# Patient Record
Sex: MALE | Race: WHITE | NOT HISPANIC OR LATINO | Employment: FULL TIME | URBAN - METROPOLITAN AREA
[De-identification: names, ages, dates, MRNs, and addresses within clinical notes are randomized per-mention and may not be internally consistent; named-entity substitution may affect disease eponyms.]

---

## 2017-05-22 ENCOUNTER — HOSPITAL ENCOUNTER (OUTPATIENT)
Dept: RADIOLOGY | Facility: HOSPITAL | Age: 57
Discharge: HOME | End: 2017-05-22
Attending: FAMILY MEDICINE
Payer: COMMERCIAL

## 2017-05-22 DIAGNOSIS — M86.171 OSTEOMYELITIS, ACUTE, ANKLE OR FOOT, RIGHT: ICD-10-CM

## 2017-05-22 DIAGNOSIS — S90.551A: ICD-10-CM

## 2017-05-22 DIAGNOSIS — S90.551A: Primary | ICD-10-CM

## 2017-05-22 PROCEDURE — 73610 X-RAY EXAM OF ANKLE: CPT | Mod: 26,RT,, | Performed by: RADIOLOGY

## 2017-05-22 PROCEDURE — 73610 X-RAY EXAM OF ANKLE: CPT | Mod: TC,RT

## 2017-05-26 ENCOUNTER — HOSPITAL ENCOUNTER (OUTPATIENT)
Dept: RADIOLOGY | Facility: HOSPITAL | Age: 57
Discharge: HOME | End: 2017-05-26
Attending: FAMILY MEDICINE
Payer: COMMERCIAL

## 2017-05-26 DIAGNOSIS — R19.7 DIARRHEA: ICD-10-CM

## 2017-05-26 DIAGNOSIS — R10.10 PAIN LOCALIZED TO UPPER ABDOMEN: ICD-10-CM

## 2017-05-26 DIAGNOSIS — R25.2 CRAMP OF LIMB: ICD-10-CM

## 2017-05-26 DIAGNOSIS — K21.00 GERD WITH ESOPHAGITIS: ICD-10-CM

## 2017-05-26 DIAGNOSIS — R19.7 DIARRHEA: Primary | ICD-10-CM

## 2017-05-26 PROCEDURE — 76700 US EXAM ABDOM COMPLETE: CPT | Mod: TC

## 2017-05-26 PROCEDURE — 76700 US EXAM ABDOM COMPLETE: CPT | Mod: 26,,, | Performed by: RADIOLOGY

## 2017-06-06 ENCOUNTER — HOSPITAL ENCOUNTER (OUTPATIENT)
Dept: PREADMISSION TESTING | Facility: HOSPITAL | Age: 57
Discharge: HOME | End: 2017-06-06
Attending: SURGERY
Payer: COMMERCIAL

## 2017-06-06 VITALS
WEIGHT: 266 LBS | TEMPERATURE: 98 F | HEIGHT: 70 IN | BODY MASS INDEX: 38.08 KG/M2 | SYSTOLIC BLOOD PRESSURE: 156 MMHG | HEART RATE: 91 BPM | DIASTOLIC BLOOD PRESSURE: 87 MMHG | RESPIRATION RATE: 18 BRPM | OXYGEN SATURATION: 96 %

## 2017-06-06 DIAGNOSIS — Z01.818 PRE-OP TESTING: Primary | ICD-10-CM

## 2017-06-06 LAB
ANION GAP SERPL CALC-SCNC: 10 MMOL/L
BASOPHILS # BLD AUTO: 0.05 K/UL
BASOPHILS NFR BLD: 0.7 %
BUN SERPL-MCNC: 19 MG/DL
CALCIUM SERPL-MCNC: 10.1 MG/DL
CHLORIDE SERPL-SCNC: 97 MMOL/L
CO2 SERPL-SCNC: 30 MMOL/L
CREAT SERPL-MCNC: 0.9 MG/DL
DIFFERENTIAL METHOD: ABNORMAL
EOSINOPHIL # BLD AUTO: 0.3 K/UL
EOSINOPHIL NFR BLD: 4 %
ERYTHROCYTE [DISTWIDTH] IN BLOOD BY AUTOMATED COUNT: 13.3 %
EST. GFR  (AFRICAN AMERICAN): >60 ML/MIN/1.73 M^2
EST. GFR  (NON AFRICAN AMERICAN): >60 ML/MIN/1.73 M^2
GLUCOSE SERPL-MCNC: 95 MG/DL
HCT VFR BLD AUTO: 45.9 %
HGB BLD-MCNC: 15.7 G/DL
LYMPHOCYTES # BLD AUTO: 1.8 K/UL
LYMPHOCYTES NFR BLD: 23.9 %
MCH RBC QN AUTO: 29.6 PG
MCHC RBC AUTO-ENTMCNC: 34.2 %
MCV RBC AUTO: 86 FL
MONOCYTES # BLD AUTO: 0.6 K/UL
MONOCYTES NFR BLD: 8 %
NEUTROPHILS # BLD AUTO: 4.8 K/UL
NEUTROPHILS NFR BLD: 63.1 %
PLATELET # BLD AUTO: 323 K/UL
PMV BLD AUTO: 8.6 FL
POTASSIUM SERPL-SCNC: 4.5 MMOL/L
RBC # BLD AUTO: 5.31 M/UL
SODIUM SERPL-SCNC: 137 MMOL/L
WBC # BLD AUTO: 7.53 K/UL

## 2017-06-06 PROCEDURE — 80048 BASIC METABOLIC PNL TOTAL CA: CPT

## 2017-06-06 PROCEDURE — 93005 ELECTROCARDIOGRAM TRACING: CPT

## 2017-06-06 PROCEDURE — 85025 COMPLETE CBC W/AUTO DIFF WBC: CPT

## 2017-06-06 PROCEDURE — 36415 COLL VENOUS BLD VENIPUNCTURE: CPT

## 2017-06-06 NOTE — DISCHARGE INSTRUCTIONS
Your surgery is scheduled for___6/7/2017______________.    Call [phone] between 2 pm and 5 pm _today___________ to find out your arrival time for the day of surgery.    Report to SAME DAY SURGERY UNIT at _______am on the 2nd floor of the hospital.  Use the front entrance of the hospital before 6 am.  If you need wheelchair assistance, call [phone] from your cell phone,  or call 0 from the courtesy phone in the hospital lobby.    Important instructions:   Do not eat or drink after 12 midnight, including water.  It is okay to brush your teeth.  Do not have gum, candy or mints.     Take only these medications with a small swallow of water on the morning of your surgery.____none _________             Please shower the night before and the morning of your surgery.       Use Hibiclens soap to your surgery site if instructed by your pre op nurse.   If your surgery is on your abdomen, be sure to wash your naval.  Be sure to rinse off Hibiclens after it is on your skin for several minutes.  Do not use Hibiclens on your face or genitals.              You may wear deodorant only.      Do not wear powder, body lotion or cologne.     Do not wear any jewelry or have any metal on your body.     Please bring any documents given to you by your doctor.     If you are going home on the same day of surgery, you must have arrangements for a ride home.  You will not be able to drive home if you were given anesthesia or sedation.     Stop taking Aspirin, Ibuprofen, Motrin and Aleve at least 3-5 days before your surgery. You may use Tylenol.     Stop taking fish oil and vitamin E for least 5 days before surgery.     Wear loose fitting clothes allowing for bandages.     Please leave money and valuables home.       You may bring your cell phone.     Call the doctor if fever or illness should occur before your surgery.    Call [phone] to contact us here at [facility] if needed.

## 2017-06-07 ENCOUNTER — ANESTHESIA (OUTPATIENT)
Dept: SURGERY | Facility: HOSPITAL | Age: 57
End: 2017-06-07
Payer: COMMERCIAL

## 2017-06-07 ENCOUNTER — HOSPITAL ENCOUNTER (OUTPATIENT)
Facility: HOSPITAL | Age: 57
Discharge: HOME | End: 2017-06-07
Attending: SURGERY | Admitting: SURGERY
Payer: COMMERCIAL

## 2017-06-07 ENCOUNTER — ANESTHESIA EVENT (OUTPATIENT)
Dept: SURGERY | Facility: HOSPITAL | Age: 57
End: 2017-06-07
Payer: COMMERCIAL

## 2017-06-07 VITALS
DIASTOLIC BLOOD PRESSURE: 81 MMHG | SYSTOLIC BLOOD PRESSURE: 137 MMHG | HEART RATE: 91 BPM | RESPIRATION RATE: 18 BRPM | WEIGHT: 266 LBS | TEMPERATURE: 98 F | HEIGHT: 70 IN | BODY MASS INDEX: 38.08 KG/M2 | OXYGEN SATURATION: 96 %

## 2017-06-07 DIAGNOSIS — K80.20 CALCULUS OF GALLBLADDER WITHOUT CHOLECYSTITIS WITHOUT OBSTRUCTION: Primary | ICD-10-CM

## 2017-06-07 PROCEDURE — 71000016 HC POSTOP RECOV ADDL HR: Performed by: SURGERY

## 2017-06-07 PROCEDURE — 88304 TISSUE EXAM BY PATHOLOGIST: CPT | Mod: 26,,, | Performed by: PATHOLOGY

## 2017-06-07 PROCEDURE — 25000003 PHARM REV CODE 250: Performed by: NURSE ANESTHETIST, CERTIFIED REGISTERED

## 2017-06-07 PROCEDURE — 25000242 PHARM REV CODE 250 ALT 637 W/ HCPCS

## 2017-06-07 PROCEDURE — D9220A PRA ANESTHESIA: Mod: CRNA,,, | Performed by: NURSE ANESTHETIST, CERTIFIED REGISTERED

## 2017-06-07 PROCEDURE — 71000033 HC RECOVERY, INTIAL HOUR: Performed by: SURGERY

## 2017-06-07 PROCEDURE — 36000708 HC OR TIME LEV III 1ST 15 MIN: Performed by: SURGERY

## 2017-06-07 PROCEDURE — 25000003 PHARM REV CODE 250: Performed by: SURGERY

## 2017-06-07 PROCEDURE — 27201423 OPTIME MED/SURG SUP & DEVICES STERILE SUPPLY: Performed by: SURGERY

## 2017-06-07 PROCEDURE — 63600175 PHARM REV CODE 636 W HCPCS: Performed by: NURSE ANESTHETIST, CERTIFIED REGISTERED

## 2017-06-07 PROCEDURE — D9220A PRA ANESTHESIA: Mod: ANES,,, | Performed by: ANESTHESIOLOGY

## 2017-06-07 PROCEDURE — 71000015 HC POSTOP RECOV 1ST HR: Performed by: SURGERY

## 2017-06-07 PROCEDURE — 37000009 HC ANESTHESIA EA ADD 15 MINS: Performed by: SURGERY

## 2017-06-07 PROCEDURE — 37000008 HC ANESTHESIA 1ST 15 MINUTES: Performed by: SURGERY

## 2017-06-07 PROCEDURE — 36000709 HC OR TIME LEV III EA ADD 15 MIN: Performed by: SURGERY

## 2017-06-07 PROCEDURE — 63600175 PHARM REV CODE 636 W HCPCS: Performed by: ANESTHESIOLOGY

## 2017-06-07 PROCEDURE — 88304 TISSUE EXAM BY PATHOLOGIST: CPT | Performed by: PATHOLOGY

## 2017-06-07 PROCEDURE — 71000039 HC RECOVERY, EACH ADD'L HOUR: Performed by: SURGERY

## 2017-06-07 RX ORDER — CEFAZOLIN SODIUM 1 G/50ML
SOLUTION INTRAVENOUS
Status: DISCONTINUED
Start: 2017-06-07 | End: 2017-06-08 | Stop reason: HOSPADM

## 2017-06-07 RX ORDER — IPRATROPIUM BROMIDE AND ALBUTEROL SULFATE 2.5; .5 MG/3ML; MG/3ML
3 SOLUTION RESPIRATORY (INHALATION) ONCE
Status: COMPLETED | OUTPATIENT
Start: 2017-06-07 | End: 2017-06-07

## 2017-06-07 RX ORDER — FENTANYL CITRATE 50 UG/ML
INJECTION, SOLUTION INTRAMUSCULAR; INTRAVENOUS
Status: DISCONTINUED | OUTPATIENT
Start: 2017-06-07 | End: 2017-06-07

## 2017-06-07 RX ORDER — PROPOFOL 10 MG/ML
VIAL (ML) INTRAVENOUS
Status: DISCONTINUED | OUTPATIENT
Start: 2017-06-07 | End: 2017-06-07

## 2017-06-07 RX ORDER — FENTANYL CITRATE 50 UG/ML
25 INJECTION, SOLUTION INTRAMUSCULAR; INTRAVENOUS EVERY 5 MIN PRN
Status: DISCONTINUED | OUTPATIENT
Start: 2017-06-07 | End: 2017-06-08 | Stop reason: HOSPADM

## 2017-06-07 RX ORDER — SODIUM CHLORIDE, SODIUM LACTATE, POTASSIUM CHLORIDE, CALCIUM CHLORIDE 600; 310; 30; 20 MG/100ML; MG/100ML; MG/100ML; MG/100ML
INJECTION, SOLUTION INTRAVENOUS CONTINUOUS
Status: DISCONTINUED | OUTPATIENT
Start: 2017-06-07 | End: 2017-06-08 | Stop reason: HOSPADM

## 2017-06-07 RX ORDER — NEOSTIGMINE METHYLSULFATE 1 MG/ML
INJECTION, SOLUTION INTRAVENOUS
Status: DISCONTINUED | OUTPATIENT
Start: 2017-06-07 | End: 2017-06-07

## 2017-06-07 RX ORDER — ACETAMINOPHEN 325 MG/1
650 TABLET ORAL EVERY 4 HOURS PRN
Status: DISCONTINUED | OUTPATIENT
Start: 2017-06-07 | End: 2017-06-08 | Stop reason: HOSPADM

## 2017-06-07 RX ORDER — ONDANSETRON 2 MG/ML
INJECTION INTRAMUSCULAR; INTRAVENOUS
Status: DISCONTINUED | OUTPATIENT
Start: 2017-06-07 | End: 2017-06-07

## 2017-06-07 RX ORDER — HYDROMORPHONE HYDROCHLORIDE 2 MG/ML
0.2 INJECTION, SOLUTION INTRAMUSCULAR; INTRAVENOUS; SUBCUTANEOUS EVERY 5 MIN PRN
Status: DISCONTINUED | OUTPATIENT
Start: 2017-06-07 | End: 2017-06-08 | Stop reason: HOSPADM

## 2017-06-07 RX ORDER — HYDROCODONE BITARTRATE AND ACETAMINOPHEN 10; 325 MG/1; MG/1
1 TABLET ORAL EVERY 4 HOURS PRN
Status: DISCONTINUED | OUTPATIENT
Start: 2017-06-07 | End: 2017-06-08 | Stop reason: HOSPADM

## 2017-06-07 RX ORDER — IPRATROPIUM BROMIDE AND ALBUTEROL SULFATE 2.5; .5 MG/3ML; MG/3ML
SOLUTION RESPIRATORY (INHALATION)
Status: COMPLETED
Start: 2017-06-07 | End: 2017-06-07

## 2017-06-07 RX ORDER — HYDROCODONE BITARTRATE AND ACETAMINOPHEN 5; 325 MG/1; MG/1
1 TABLET ORAL EVERY 4 HOURS PRN
Status: DISCONTINUED | OUTPATIENT
Start: 2017-06-07 | End: 2017-06-08 | Stop reason: HOSPADM

## 2017-06-07 RX ORDER — SODIUM CHLORIDE 9 MG/ML
INJECTION, SOLUTION INTRAVENOUS CONTINUOUS
Status: DISCONTINUED | OUTPATIENT
Start: 2017-06-07 | End: 2017-06-08 | Stop reason: HOSPADM

## 2017-06-07 RX ORDER — METOCLOPRAMIDE HYDROCHLORIDE 5 MG/ML
INJECTION INTRAMUSCULAR; INTRAVENOUS
Status: DISCONTINUED | OUTPATIENT
Start: 2017-06-07 | End: 2017-06-07

## 2017-06-07 RX ORDER — SODIUM CHLORIDE 0.9 % (FLUSH) 0.9 %
3 SYRINGE (ML) INJECTION EVERY 8 HOURS
Status: DISCONTINUED | OUTPATIENT
Start: 2017-06-07 | End: 2017-06-08 | Stop reason: HOSPADM

## 2017-06-07 RX ORDER — GLYCOPYRROLATE 0.2 MG/ML
INJECTION INTRAMUSCULAR; INTRAVENOUS
Status: DISCONTINUED | OUTPATIENT
Start: 2017-06-07 | End: 2017-06-07

## 2017-06-07 RX ORDER — HYDROCODONE BITARTRATE AND ACETAMINOPHEN 5; 325 MG/1; MG/1
1-2 TABLET ORAL EVERY 4 HOURS PRN
Qty: 50 TABLET | Refills: 0 | Status: SHIPPED | OUTPATIENT
Start: 2017-06-07 | End: 2022-01-12

## 2017-06-07 RX ORDER — ROCURONIUM BROMIDE 10 MG/ML
INJECTION, SOLUTION INTRAVENOUS
Status: DISCONTINUED | OUTPATIENT
Start: 2017-06-07 | End: 2017-06-07

## 2017-06-07 RX ORDER — LIDOCAINE HCL/PF 100 MG/5ML
SYRINGE (ML) INTRAVENOUS
Status: DISCONTINUED | OUTPATIENT
Start: 2017-06-07 | End: 2017-06-07

## 2017-06-07 RX ORDER — PHENYLEPHRINE HYDROCHLORIDE 10 MG/ML
INJECTION INTRAVENOUS
Status: DISCONTINUED | OUTPATIENT
Start: 2017-06-07 | End: 2017-06-07

## 2017-06-07 RX ORDER — ONDANSETRON 2 MG/ML
4 INJECTION INTRAMUSCULAR; INTRAVENOUS EVERY 8 HOURS PRN
Status: DISCONTINUED | OUTPATIENT
Start: 2017-06-07 | End: 2017-06-08 | Stop reason: HOSPADM

## 2017-06-07 RX ORDER — CEFAZOLIN SODIUM 2 G/50ML
SOLUTION INTRAVENOUS
Status: DISCONTINUED
Start: 2017-06-07 | End: 2017-06-08 | Stop reason: HOSPADM

## 2017-06-07 RX ORDER — MIDAZOLAM HYDROCHLORIDE 1 MG/ML
INJECTION, SOLUTION INTRAMUSCULAR; INTRAVENOUS
Status: DISCONTINUED | OUTPATIENT
Start: 2017-06-07 | End: 2017-06-07

## 2017-06-07 RX ORDER — SODIUM CHLORIDE 0.9 % (FLUSH) 0.9 %
3 SYRINGE (ML) INJECTION
Status: DISCONTINUED | OUTPATIENT
Start: 2017-06-07 | End: 2017-06-08 | Stop reason: HOSPADM

## 2017-06-07 RX ORDER — SUCCINYLCHOLINE CHLORIDE 20 MG/ML
INJECTION INTRAMUSCULAR; INTRAVENOUS
Status: DISCONTINUED | OUTPATIENT
Start: 2017-06-07 | End: 2017-06-07

## 2017-06-07 RX ADMIN — PHENYLEPHRINE HYDROCHLORIDE 300 MCG: 10 INJECTION INTRAVENOUS at 01:06

## 2017-06-07 RX ADMIN — NEOSTIGMINE METHYLSULFATE 3.5 MG: 1 INJECTION INTRAVENOUS at 01:06

## 2017-06-07 RX ADMIN — FENTANYL CITRATE 100 MCG: 50 INJECTION INTRAMUSCULAR; INTRAVENOUS at 12:06

## 2017-06-07 RX ADMIN — HYDROMORPHONE HYDROCHLORIDE 0.2 MG: 2 INJECTION INTRAMUSCULAR; INTRAVENOUS; SUBCUTANEOUS at 02:06

## 2017-06-07 RX ADMIN — IPRATROPIUM BROMIDE AND ALBUTEROL SULFATE 3 ML: 2.5; .5 SOLUTION RESPIRATORY (INHALATION) at 03:06

## 2017-06-07 RX ADMIN — SODIUM CHLORIDE, SODIUM LACTATE, POTASSIUM CHLORIDE, AND CALCIUM CHLORIDE: .6; .31; .03; .02 INJECTION, SOLUTION INTRAVENOUS at 07:06

## 2017-06-07 RX ADMIN — LIDOCAINE HYDROCHLORIDE 100 MG: 20 INJECTION, SOLUTION INTRAVENOUS at 12:06

## 2017-06-07 RX ADMIN — ONDANSETRON 4 MG: 2 INJECTION, SOLUTION INTRAMUSCULAR; INTRAVENOUS at 01:06

## 2017-06-07 RX ADMIN — PHENYLEPHRINE HYDROCHLORIDE 200 MCG: 10 INJECTION INTRAVENOUS at 01:06

## 2017-06-07 RX ADMIN — GLYCOPYRROLATE 0.2 MG: 0.2 INJECTION, SOLUTION INTRAMUSCULAR; INTRAVENOUS at 12:06

## 2017-06-07 RX ADMIN — SODIUM CHLORIDE: 0.9 INJECTION, SOLUTION INTRAVENOUS at 02:06

## 2017-06-07 RX ADMIN — METOCLOPRAMIDE 10 MG: 5 INJECTION, SOLUTION INTRAMUSCULAR; INTRAVENOUS at 12:06

## 2017-06-07 RX ADMIN — ROCURONIUM BROMIDE 20 MG: 10 INJECTION, SOLUTION INTRAVENOUS at 12:06

## 2017-06-07 RX ADMIN — CEFAZOLIN SODIUM 1 G: 1 POWDER, FOR SOLUTION INTRAMUSCULAR; INTRAVENOUS at 12:06

## 2017-06-07 RX ADMIN — PROPOFOL 200 MG: 10 INJECTION, EMULSION INTRAVENOUS at 12:06

## 2017-06-07 RX ADMIN — MIDAZOLAM HYDROCHLORIDE 2 MG: 1 INJECTION, SOLUTION INTRAMUSCULAR; INTRAVENOUS at 12:06

## 2017-06-07 RX ADMIN — GLYCOPYRROLATE 0.4 MG: 0.2 INJECTION, SOLUTION INTRAMUSCULAR; INTRAVENOUS at 01:06

## 2017-06-07 RX ADMIN — CEFAZOLIN SODIUM 2 G: 1 POWDER, FOR SOLUTION INTRAMUSCULAR; INTRAVENOUS at 12:06

## 2017-06-07 RX ADMIN — SUCCINYLCHOLINE CHLORIDE 200 MG: 20 INJECTION, SOLUTION INTRAMUSCULAR; INTRAVENOUS at 12:06

## 2017-06-07 RX ADMIN — IPRATROPIUM BROMIDE AND ALBUTEROL SULFATE 3 ML: .5; 3 SOLUTION RESPIRATORY (INHALATION) at 03:06

## 2017-06-07 RX ADMIN — ROCURONIUM BROMIDE 10 MG: 10 INJECTION, SOLUTION INTRAVENOUS at 12:06

## 2017-06-07 NOTE — INTERVAL H&P NOTE
The patient has been examined and the H&P has been reviewed:    I concur with the findings and no changes have occurred since H&P was written.    Anesthesia/Surgery risks, benefits and alternative options discussed and understood by patient/family.          Active Hospital Problems    Diagnosis  POA    Calculus of gallbladder without cholecystitis without obstruction [K80.20]  Yes      Resolved Hospital Problems    Diagnosis Date Resolved POA   No resolved problems to display.

## 2017-06-07 NOTE — OP NOTE
Laparoscopic Cholecystectomy Procedure Note    Pre-operative Diagnosis: Calculus of gallbladder with other cholecystitis, without mention of obstruction    Post-operative Diagnosis: Same    Surgery Date: 6/7/2017     Surgeon: Juanito Snell     Assistants: none    Anesthesia: General endotracheal anesthesia    ASA Class: 2      Indications: This patient presents with symptomatic gallbladder disease and will undergo laparoscopic cholecystectomy.    The patient was seen again in the Holding Room. The risks and benefits, including but not limited to common bile duct injury were explained to the patient, who acknowledges these risks and wishes to proceed.    Procedure Details   The patient was taken to Operating Room, identified as Ned Brock and the procedure verified as Laparoscopic Cholecystectomy. A Time Out was held and the above information confirmed.    Prior to the induction of general anesthesia, antibiotic prophylaxis was administered. General endotracheal anesthesia was then administered and tolerated well. After the induction, the abdomen was prepped in the usual sterile fashion. The patient was positioned in the supine position with the arms extended.    All ports were injected with a combination of short and long acting local anesthetic prior to incision.  An incision was made in the umbilicus.  The skin edges were grasped with penetrating towel clips and used to retract the abdominal wall upward.  A Veress needle was inserted into the peritoneal cavity.  Initial pressures were less than 5 mm Hg.  A pneumoperitoneum to 15 mm Hg was obtained.  The abdomen was then entered under direct visualization using an optical viewing 11 mm trochar and a 0 degree laparoscope.  The underlying bowel was inspected and found to be free of injury.  The remaining ports were placed under direct visualization.  All were 5 mm ports: one in the subxiphoid area, one in the right midclavicular line, and one in the right  anterior axillary line.    A locking grasper was introduced through the most lateral port and used to retract the dome of the gallbladder up and over the edge of the liver.  A Derek grasper was used to retract the infundibulum of the gallbladder laterally.  The triangle of Calot was opened using electrocautery and dissected using Maryland forceps.  Once a single duct and single artery were seen entering the gallbladder, the cystic duct was clipped once proximally, twice distally and divided.  The cystic artery was clipped once proximally, once distally and divided.  The gallbladder was then removed from the gallbladder fossa using electrocautery and removed through the umbilicus.    The right upper quadrant was irrigated until clear.  Hemostasis on the gallbladder fossa was achieved with electrocautery.  The pneumoperitoneum was then expelled from the abdomen, and all ports were removed.  The umbilical fascia was reapproximated with a single figure-of-eight 0 vicryl suture.  The skin over all incisions were closed using 4-0 monocryl deep dermal sutures.  Dermabond was applied and the patient was aroused and transferred to the recovery room in stable condition.    Instrument, sponge, and needle counts were correct at closure and at the conclusion of the case.  There were no immediate complications.    Findings:  Cholecystitis with Cholelithiasis    Estimated Blood Loss: Minimal           Drains: none           Total IV Fluids: see anesthesia records           Specimens: Gallbladder             Complications: None; patient tolerated the procedure well.           Disposition: PACU - hemodynamically stable.           Condition: stable

## 2017-06-07 NOTE — DISCHARGE SUMMARY
[facility]  Discharge Note  Short Stay    Admit Date: 6/7/2017    Discharge Date and Time: 6/7/17    Attending Physician: Juanito Snell MD     Discharge Provider: Juanito Snell    Diagnoses:  Active Hospital Problems    Diagnosis  POA    *Calculus of gallbladder without cholecystitis without obstruction [K80.20]  Yes      Resolved Hospital Problems    Diagnosis Date Resolved POA   No resolved problems to display.       Discharged Condition: good    Hospital Course: Patient was admitted for an outpatient procedure and tolerated the procedure well with no complications.    Final Diagnoses: Same as principal problem.    Disposition: Home or Self Care    Follow up/Patient Instructions:    Medications:  Reconciled Home Medications:   Current Discharge Medication List      START taking these medications    Details   hydrocodone-acetaminophen 5-325mg (NORCO) 5-325 mg per tablet Take 1-2 tablets by mouth every 4 (four) hours as needed for Pain.  Qty: 50 tablet, Refills: 0         CONTINUE these medications which have NOT CHANGED    Details   amoxicillin-clavulanate 875-125mg (AUGMENTIN) 875-125 mg per tablet Take 1 tablet by mouth every 12 (twelve) hours.      doxycycline (VIBRAMYCIN) 100 MG Cap Take 100 mg by mouth 2 (two) times daily.      losartan-hydrochlorothiazide 100-25 mg (HYZAAR) 100-25 mg per tablet Take 1 tablet by mouth once daily.      omeprazole (PRILOSEC) 20 MG capsule Take 20 mg by mouth once daily.      dicyclomine (BENTYL) 20 mg tablet Take 20 mg by mouth every 6 (six) hours.             Discharge Procedure Orders  Diet general     Lifting restrictions   Order Comments: No lifting greater than 10 pounds for 6 weeks     Call MD for:  temperature >100.4     Call MD for:  persistent nausea and vomiting     Call MD for:  severe uncontrolled pain     Call MD for:  difficulty breathing, headache or visual disturbances     Call MD for:  redness, tenderness, or signs of infection (pain, swelling,  redness, odor or green/yellow discharge around incision site)     Call MD for:  hives     Call MD for:  persistent dizziness or light-headedness     Call MD for:  extreme fatigue     No dressing needed       Follow-up Information     Follow up In 2 weeks.                 Discharge Procedure Orders (must include Diet, Follow-up, Activity):    Discharge Procedure Orders (must include Diet, Follow-up, Activity)  Diet general     Lifting restrictions   Order Comments: No lifting greater than 10 pounds for 6 weeks     Call MD for:  temperature >100.4     Call MD for:  persistent nausea and vomiting     Call MD for:  severe uncontrolled pain     Call MD for:  difficulty breathing, headache or visual disturbances     Call MD for:  redness, tenderness, or signs of infection (pain, swelling, redness, odor or green/yellow discharge around incision site)     Call MD for:  hives     Call MD for:  persistent dizziness or light-headedness     Call MD for:  extreme fatigue     No dressing needed

## 2017-06-07 NOTE — ANESTHESIA PREPROCEDURE EVALUATION
06/07/2017  Ned Brock is a 56 y.o., male.    Anesthesia Evaluation    I have reviewed the Patient Summary Reports.    I have reviewed the Nursing Notes.      Review of Systems  Anesthesia Hx:  No problems with previous Anesthesia    Social:  Non-Smoker    Cardiovascular:   Exercise tolerance: good Denies Pacemaker. Hypertension  Denies Valvular problems/Murmurs.  Denies MI.  Denies CAD.    Denies CABG/stent.  Denies Dysrhythmias.   Denies Angina.             denies PVD    Pulmonary:  Pulmonary Normal    Renal/:  Renal/ Normal     Hepatic/GI:   No Bowel Prep. Denies PUD. GERD, well controlled Liver Disease, (pt has liver dz (?fatty liver) secondary to dietary indiscretion; possible ascites)    Neurological:  Neurology Normal    Endocrine:  Endocrine Normal        Physical Exam  General:  Morbid Obesity    Airway/Jaw/Neck:   M4  Adequate mouth opening  Denies loose dentition  Short thick neck limits ROM  Poor prognatism          Mental Status:  Mental Status Findings: Normal        Anesthesia Plan  Type of Anesthesia, risks & benefits discussed:  Anesthesia Type:  general  Patient's Preference:   Intra-op Monitoring Plan:   Intra-op Monitoring Plan Comments:   Post Op Pain Control Plan:   Post Op Pain Control Plan Comments:   Induction:   IV  Beta Blocker:  Patient is not currently on a Beta-Blocker (No further documentation required).       Informed Consent: Patient understands risks and agrees with Anesthesia plan.  Questions answered. Anesthesia consent signed with patient.  ASA Score: 3     Day of Surgery Review of History & Physical:    H&P update referred to the surgeon.     Anesthesia Plan Notes: Npo  Will have advanced airway equipment set up and in room         Ready For Surgery From Anesthesia Perspective.

## 2017-06-07 NOTE — OR NURSING
Pt alert and awake.  Dermaflex applied to umbilical incision.  No bloody oozing noted at present.  Abdomen large and firm.  Pt reports abdomen to be that size before surgery.  Will continue to monitor patient.

## 2017-06-07 NOTE — H&P (VIEW-ONLY)
History & Physical    SUBJECTIVE:     History of Present Illness:  Patient is a 56 y.o. male with long history of bloating and epigastric pain.  Was initially felt to have ulcers but no relief with PPI.  Pain seems to be worse with fatty foods.  Resolves spontaneously.    Chief Complaint   Patient presents with    Cholelithiasis       Review of patient's allergies indicates:  No Known Allergies    Current Outpatient Prescriptions   Medication Sig Dispense Refill    amoxicillin-clavulanate 875-125mg (AUGMENTIN) 875-125 mg per tablet Take 1 tablet by mouth every 12 (twelve) hours.      dicyclomine (BENTYL) 20 mg tablet Take 20 mg by mouth every 6 (six) hours.      doxycycline (VIBRAMYCIN) 100 MG Cap Take 100 mg by mouth 2 (two) times daily.      losartan-hydrochlorothiazide 100-25 mg (HYZAAR) 100-25 mg per tablet Take 1 tablet by mouth once daily.      omeprazole (PRILOSEC) 20 MG capsule Take 20 mg by mouth once daily.       No current facility-administered medications for this visit.        Past Medical History:   Diagnosis Date    BPH without obstruction/lower urinary tract symptoms     Elevated PSA     Hypertension      Past Surgical History:   Procedure Laterality Date    CYST REMOVAL      EYE SURGERY       Family History   Problem Relation Age of Onset    Breast cancer Mother      Social History   Substance Use Topics    Smoking status: Never Smoker    Smokeless tobacco: Not on file    Alcohol use No        Review of Systems:    Review of Systems   Constitutional: Negative for chills and fever.   HENT: Negative.    Eyes: Negative.    Respiratory: Negative for cough and shortness of breath.    Cardiovascular: Negative for chest pain and palpitations.   Gastrointestinal: Positive for abdominal pain. Negative for nausea and vomiting.        Bloating   Musculoskeletal: Negative.    Skin: Negative.    Hematological: Negative.    Psychiatric/Behavioral: Negative.        OBJECTIVE:     Vital Signs (Most  "Recent)     5' 10" (1.778 m)  120.2 kg (265 lb)     Physical Exam:    Physical Exam   Constitutional: He is oriented to person, place, and time. He appears well-developed and well-nourished.   HENT:   Head: Normocephalic and atraumatic.   Eyes: Pupils are equal, round, and reactive to light. No scleral icterus.   Neck: Normal range of motion. No thyromegaly present.   Cardiovascular: Normal rate and regular rhythm.    Pulmonary/Chest: Effort normal and breath sounds normal.   Abdominal: Soft. He exhibits no distension. There is no tenderness.   Neurological: He is alert and oriented to person, place, and time.   Skin: Skin is warm and dry.   Psychiatric: He has a normal mood and affect. Thought content normal.       Laboratory      Diagnostic Results:  US shows cholelithiasis    ASSESSMENT/PLAN:     Cholelithiasis with biliary colic--lap cholecystectomy  "

## 2017-06-07 NOTE — TRANSFER OF CARE
"Anesthesia Transfer of Care Note    Patient: Ned Brock    Procedure(s) Performed: Procedure(s) (LRB):  CHOLECYSTECTOMY-LAPAROSCOPIC (N/A)    Patient location: PACU    Anesthesia Type: general    Transport from OR: Transported from OR on room air with adequate spontaneous ventilation    Post pain: adequate analgesia    Post assessment: no apparent anesthetic complications    Post vital signs: stable    Level of consciousness: awake    Nausea/Vomiting: no nausea/vomiting    Complications: none    Transfer of care protocol was followed      Last vitals:   Visit Vitals  /74 (BP Location: Right arm, Patient Position: Lying, BP Method: Automatic)   Pulse 87   Temp 37.3 °C (99.1 °F) (Oral)   Resp 18   Ht 5' 10" (1.778 m)   Wt 120.7 kg (266 lb)   SpO2 (!) 94%   BMI 38.17 kg/m²     "

## 2017-06-07 NOTE — PLAN OF CARE
Problem: Patient Care Overview  Goal: Plan of Care Review  Outcome: Outcome(s) achieved Date Met: 06/07/17 06/07/17 1600   Coping/Psychosocial   Plan Of Care Reviewed With patient;spouse     Released from PACU per Dr Alvarado. LIVIA4. VSS per flow sheet. Pain is tolerable per patient. Lap sites x 3 with dermabond to abdomen cdi. No n/v noted; tolerating ice chips. Spouse at bedside. Hand off report to CINTIA Villarreal. Same Day Surgery

## 2017-06-07 NOTE — PROGRESS NOTES
Incentive spirometry taught and performed well, but Unable to retain O2 sat >92% on room air.  Dr Oh notified; duoneb tx ordered and administered. Will continue to monitor. Family updated.

## 2017-06-07 NOTE — OR NURSING
Notified surgery ( CINTIA Quiroz) that pt abdominal incision is oozing; also, gauze dressing placed at abdominal site.   Instructed that he would deliver message to Dr. Snell.

## 2017-06-07 NOTE — DISCHARGE INSTRUCTIONS
No lifting greater than 10 pounds for 6 weeks  DRESSING:  You have Dermaflex covering your incision.  It is a clear liquid that dries like super glue.  Do not try to remove it.  It will flake off over time.  You may wash it with soap and water in the shower.  Do not apply any creams or lotion on it.    BATHING:    You may shower, but no tub baths until you see the doctor.    ACTIVITY LEVEL: If you have received sedation or an anesthetic, you may feel sleepy for several hours. Rest until you are more awake. Gradually resume your normal activities. No driving or alcoholic beverages for 24 hours.     No driving, alcoholic beverages or signing legal documents for next 24 hours or while taking pain medication    DIET: You may resume your home diet. If nausea is present, increase your diet gradually with fluids and bland foods.    Medications: Pain medication should be taken only if needed and as directed. If antibiotics are prescribed, the medication should be taken until completed. You will be given an updated list of you medications.    CALL THE DOCTOR:    For any obvious bleeding (some dried blood over the incision is normal).                          Redness, swelling, foul smell around incision or fever over            101.                    Shortness of breath.   Persistent pain or nausea not relieved by medication.      Fall Prevention  Millions of people fall every year and injure themselves. You may have had anesthesia or sedation which may increase your risk of falling. You may have health issues that put you at an increased risk of falling.     Here are ways to reduce your risk of falling.  ·   · Make your home safe by keeping walkways clear of objects you may trip over.  · Use non-slip pads under rugs. Do not use area rugs or small throw rugs.  · Use non-slip mats in bathtubs and showers.  · Install handrails and lights on staircases.  · Do not walk in poorly lit areas.  · Do not stand on chairs or wobbly  ladders.  · Use caution when reaching overhead or looking upward. This position can cause a loss of balance.  · Be sure your shoes fit properly, have non-slip bottoms and are in good condition.   · Wear shoes both inside and out. Avoid going barefoot or wearing slippers.  · Be cautious when going up and down stairs, curbs, and when walking on uneven sidewalks.  · If your balance is poor, consider using a cane or walker.  · If your fall was related to alcohol use, stop or limit alcohol intake.   · If your fall was related to use of sleeping medicines, talk to your doctor about this. You may need to reduce your dosage at bedtime if you awaken during the night to go to the bathroom.    · To reduce the need for nighttime bathroom trips:  ¨ Avoid drinking fluids for several hours before going to bed  ¨ Empty your bladder before going to bed  ¨ Men can keep a urinal at the bedside  · Stay as active as you can. Balance, flexibility, strength, and endurance all come from exercise. They all play a role in preventing falls. Ask your healthcare provider which types of activity are right for you.  · Get your vision checked on a regular basis.  · If you have pets, know where they are before you stand up or walk so you don't trip over them.  · Use night lights.

## 2017-06-08 NOTE — OR NURSING
Notified Dr. Snell that patient 's umbilical incision is dry and intact.  No bleeding noted. dermaflex in place at present.  Orders received.

## 2017-06-09 NOTE — ANESTHESIA POSTPROCEDURE EVALUATION
"Anesthesia Post Evaluation    Patient: Ned Brock    Procedure(s) Performed: Procedure(s) (LRB):  CHOLECYSTECTOMY-LAPAROSCOPIC (N/A)    Final Anesthesia Type: general  Patient location during evaluation: PACU  Patient participation: Yes- Able to Participate  Level of consciousness: awake and alert and oriented  Post-procedure vital signs: reviewed and stable  Pain management: adequate  Airway patency: patent  PONV status at discharge: No PONV  Anesthetic complications: no      Cardiovascular status: blood pressure returned to baseline and hemodynamically stable  Respiratory status: unassisted  Hydration status: euvolemic  Follow-up not needed.        Visit Vitals  /81   Pulse 91   Temp 36.8 °C (98.2 °F) (Oral)   Resp 18   Ht 5' 10" (1.778 m)   Wt 120.7 kg (266 lb)   SpO2 96%   BMI 38.17 kg/m²       Pain/William Score: No Data Recorded      "

## 2017-12-03 PROCEDURE — 99284 EMERGENCY DEPT VISIT MOD MDM: CPT

## 2017-12-04 ENCOUNTER — HOSPITAL ENCOUNTER (EMERGENCY)
Facility: HOSPITAL | Age: 57
Discharge: HOME | End: 2017-12-04
Attending: EMERGENCY MEDICINE
Payer: COMMERCIAL

## 2017-12-04 VITALS
WEIGHT: 260 LBS | HEIGHT: 71 IN | HEART RATE: 75 BPM | SYSTOLIC BLOOD PRESSURE: 171 MMHG | TEMPERATURE: 99 F | DIASTOLIC BLOOD PRESSURE: 92 MMHG | BODY MASS INDEX: 36.4 KG/M2 | RESPIRATION RATE: 16 BRPM | OXYGEN SATURATION: 95 %

## 2017-12-04 DIAGNOSIS — R60.0 LEG EDEMA: ICD-10-CM

## 2017-12-04 DIAGNOSIS — M25.569 KNEE PAIN: ICD-10-CM

## 2017-12-04 LAB
ALBUMIN SERPL BCP-MCNC: 3.7 G/DL
ALP SERPL-CCNC: 72 U/L
ALT SERPL W/O P-5'-P-CCNC: 47 U/L
ANION GAP SERPL CALC-SCNC: 11 MMOL/L
AST SERPL-CCNC: 32 U/L
BASOPHILS # BLD AUTO: 0.03 K/UL
BASOPHILS NFR BLD: 0.4 %
BILIRUB SERPL-MCNC: 0.3 MG/DL
BUN SERPL-MCNC: 17 MG/DL
CALCIUM SERPL-MCNC: 9.7 MG/DL
CHLORIDE SERPL-SCNC: 99 MMOL/L
CO2 SERPL-SCNC: 26 MMOL/L
CREAT SERPL-MCNC: 1.1 MG/DL
DIFFERENTIAL METHOD: ABNORMAL
EOSINOPHIL # BLD AUTO: 0.2 K/UL
EOSINOPHIL NFR BLD: 3.4 %
ERYTHROCYTE [DISTWIDTH] IN BLOOD BY AUTOMATED COUNT: 13.3 %
EST. GFR  (AFRICAN AMERICAN): >60 ML/MIN/1.73 M^2
EST. GFR  (NON AFRICAN AMERICAN): >60 ML/MIN/1.73 M^2
GLUCOSE SERPL-MCNC: 130 MG/DL
HCT VFR BLD AUTO: 43.9 %
HGB BLD-MCNC: 15 G/DL
LYMPHOCYTES # BLD AUTO: 2.4 K/UL
LYMPHOCYTES NFR BLD: 34.9 %
MCH RBC QN AUTO: 30 PG
MCHC RBC AUTO-ENTMCNC: 34.2 G/DL
MCV RBC AUTO: 88 FL
MONOCYTES # BLD AUTO: 0.5 K/UL
MONOCYTES NFR BLD: 7.6 %
NEUTROPHILS # BLD AUTO: 3.6 K/UL
NEUTROPHILS NFR BLD: 53.7 %
PLATELET # BLD AUTO: 256 K/UL
PMV BLD AUTO: 8.9 FL
POTASSIUM SERPL-SCNC: 3.7 MMOL/L
PROT SERPL-MCNC: 7.4 G/DL
RBC # BLD AUTO: 5 M/UL
SODIUM SERPL-SCNC: 136 MMOL/L
WBC # BLD AUTO: 6.74 K/UL

## 2017-12-04 PROCEDURE — 85025 COMPLETE CBC W/AUTO DIFF WBC: CPT

## 2017-12-04 PROCEDURE — 80053 COMPREHEN METABOLIC PANEL: CPT

## 2017-12-04 RX ORDER — NAPROXEN 500 MG/1
500 TABLET ORAL 2 TIMES DAILY WITH MEALS
Qty: 20 TABLET | Refills: 0 | Status: SHIPPED | OUTPATIENT
Start: 2017-12-04 | End: 2017-12-09

## 2017-12-04 RX ORDER — CYCLOBENZAPRINE HCL 10 MG
10 TABLET ORAL 3 TIMES DAILY PRN
COMMUNITY
End: 2022-01-12

## 2017-12-04 NOTE — ED TRIAGE NOTES
Pt presents to ED with c/o Left knee and leg pain. Pt reports pain has been present for a few weeks after he exited a truck and underestimated the depth to the ground. Pt reports wearing supportive knee brace with no relief. Reports swelling and increased pain after movement and bearing weight. Pulses present, no skin discoloration. Will continue to be monitored.

## 2017-12-04 NOTE — ED PROVIDER NOTES
Encounter Date: 12/3/2017    SCRIBE #1 NOTE: I, Peng Nice, am scribing for, and in the presence of,  Ludwin Reid MD. I have scribed the following portions of the note - Other sections scribed: HPI, ROS.       History     Chief Complaint   Patient presents with    Knee pain, Calf pain     Pt c/o left knee and left calf pain x3 days but worsens today. Pt states it worsens when he walks. no hx clots. Pt took flexeril for pain without relief.      CC: Knee Pain    HPI: This 57 y.o. Male with PMHx HTN, BPH w/o obstruction, elevated PSA, and PSHx cholecystectomy, eye surgery, and cyst removal presents to then ED c/o L knee pain which began x2 months ago secondary to twisting his knee. Pt says the pain worsened since using a knee brace. Pt reports exacerbation with ambulation and when lifting his L leg. Pt denies chest pain, SOB, and painful breathing. Pt reports taking ibuprofen and flexeril as tx. Pt denies having a past knee xray.       The history is provided by the patient. No  was used.     Review of patient's allergies indicates:  No Known Allergies  Past Medical History:   Diagnosis Date    BPH without obstruction/lower urinary tract symptoms     Elevated PSA     Hypertension      Past Surgical History:   Procedure Laterality Date    CHOLECYSTECTOMY      CYST REMOVAL      EYE SURGERY       Family History   Problem Relation Age of Onset    Breast cancer Mother      Social History   Substance Use Topics    Smoking status: Never Smoker    Smokeless tobacco: Never Used    Alcohol use No     Review of Systems   Constitutional: Negative for fever.   HENT: Negative for sore throat.    Respiratory: Negative for shortness of breath.         (+) painful breathing   Cardiovascular: Negative for chest pain.   Gastrointestinal: Negative for nausea.   Genitourinary: Negative for dysuria.   Musculoskeletal: Positive for arthralgias (L knee). Negative for back pain.   Skin: Negative for rash.    Neurological: Negative for weakness.   Hematological: Does not bruise/bleed easily.       Physical Exam     Initial Vitals [12/03/17 2223]   BP Pulse Resp Temp SpO2   (!) 187/98 94 16 98.7 °F (37.1 °C) 100 %      MAP       127.67         Physical Exam    Nursing note and vitals reviewed.  Constitutional: He appears well-developed and well-nourished.   HENT:   Head: Atraumatic.   Eyes: EOM are normal. Pupils are equal, round, and reactive to light.   Neck: Normal range of motion. Neck supple. No JVD present.   Cardiovascular: Normal rate, regular rhythm, normal heart sounds and intact distal pulses. Exam reveals no gallop and no friction rub.    No murmur heard.  Pulmonary/Chest: Breath sounds normal.   Abdominal: Soft. Bowel sounds are normal.   Musculoskeletal: Normal range of motion. He exhibits edema.   No evidence of septic arthritis.   Lymphadenopathy:     He has no cervical adenopathy.   Neurological: He is alert and oriented to person, place, and time. He has normal strength.   Skin: Skin is warm and dry.   Psychiatric: He has a normal mood and affect. Thought content normal.         ED Course   Procedures  Labs Reviewed   CBC W/ AUTO DIFFERENTIAL - Abnormal; Notable for the following:        Result Value    MPV 8.9 (*)     All other components within normal limits   COMPREHENSIVE METABOLIC PANEL - Abnormal; Notable for the following:     Glucose 130 (*)     ALT 47 (*)     All other components within normal limits          X-Rays:   Independently Interpreted Readings:   Other Readings:  Left knee x-ray: No bony abnormality.    Ultrasound left lower leg shows no DVT.               Scribe Attestation:   Scribe #1: I performed the above scribed service and the documentation accurately describes the services I performed. I attest to the accuracy of the note.    Attending Attestation:           Physician Attestation for Scribe:  Physician Attestation Statement for Scribe #1: I, Ludwin Reid MD, reviewed  documentation, as scribed by Peng Nice in my presence, and it is both accurate and complete.                 ED Course      Clinical Impression:   Diagnoses of Leg edema and Knee pain were pertinent to this visit.                           Ludwin Reid MD  12/30/17 5547

## 2022-01-11 PROCEDURE — 96374 THER/PROPH/DIAG INJ IV PUSH: CPT

## 2022-01-11 PROCEDURE — 99285 PR EMERGENCY DEPT VISIT,LEVEL V: ICD-10-PCS | Mod: CR,CS,, | Performed by: EMERGENCY MEDICINE

## 2022-01-11 PROCEDURE — 99285 EMERGENCY DEPT VISIT HI MDM: CPT | Mod: CR,CS,, | Performed by: EMERGENCY MEDICINE

## 2022-01-11 PROCEDURE — 99285 EMERGENCY DEPT VISIT HI MDM: CPT | Mod: 25

## 2022-01-12 ENCOUNTER — HOSPITAL ENCOUNTER (INPATIENT)
Facility: HOSPITAL | Age: 62
LOS: 4 days | Discharge: HOME | DRG: 177 | End: 2022-01-16
Attending: EMERGENCY MEDICINE | Admitting: HOSPITALIST
Payer: COMMERCIAL

## 2022-01-12 DIAGNOSIS — E11.9 TYPE 2 DIABETES MELLITUS WITHOUT COMPLICATION, WITHOUT LONG-TERM CURRENT USE OF INSULIN: Chronic | ICD-10-CM

## 2022-01-12 DIAGNOSIS — R09.02 HYPOXIA: ICD-10-CM

## 2022-01-12 DIAGNOSIS — E66.01 SEVERE OBESITY (BMI >= 40): Chronic | ICD-10-CM

## 2022-01-12 DIAGNOSIS — J42 CHRONIC BRONCHITIS, UNSPECIFIED CHRONIC BRONCHITIS TYPE: Chronic | ICD-10-CM

## 2022-01-12 DIAGNOSIS — U07.1 COVID-19: Primary | ICD-10-CM

## 2022-01-12 PROBLEM — I10 ESSENTIAL HYPERTENSION: Status: ACTIVE | Noted: 2022-01-12

## 2022-01-12 PROBLEM — I10 ESSENTIAL HYPERTENSION: Chronic | Status: ACTIVE | Noted: 2022-01-12

## 2022-01-12 PROBLEM — E87.6 HYPOKALEMIA: Status: ACTIVE | Noted: 2022-01-12

## 2022-01-12 PROBLEM — J12.82 PNEUMONIA DUE TO COVID-19 VIRUS: Status: ACTIVE | Noted: 2022-01-12

## 2022-01-12 LAB
ALBUMIN SERPL BCP-MCNC: 3.6 G/DL (ref 3.5–5.2)
ALP SERPL-CCNC: 109 U/L (ref 55–135)
ALT SERPL W/O P-5'-P-CCNC: 79 U/L (ref 10–44)
ANION GAP SERPL CALC-SCNC: 15 MMOL/L (ref 8–16)
APTT BLDCRRT: 26.8 SEC (ref 21–32)
AST SERPL-CCNC: 44 U/L (ref 10–40)
BASOPHILS # BLD AUTO: 0.05 K/UL (ref 0–0.2)
BASOPHILS NFR BLD: 0.5 % (ref 0–1.9)
BILIRUB SERPL-MCNC: 0.4 MG/DL (ref 0.1–1)
BNP SERPL-MCNC: 14 PG/ML (ref 0–99)
BUN SERPL-MCNC: 11 MG/DL (ref 6–20)
CALCIUM SERPL-MCNC: 9.6 MG/DL (ref 8.7–10.5)
CHLORIDE SERPL-SCNC: 91 MMOL/L (ref 95–110)
CK SERPL-CCNC: 203 U/L (ref 20–200)
CO2 SERPL-SCNC: 32 MMOL/L (ref 23–29)
CREAT SERPL-MCNC: 0.7 MG/DL (ref 0.5–1.4)
CRP SERPL-MCNC: 25.7 MG/L (ref 0–8.2)
CTP QC/QA: YES
D DIMER PPP IA.FEU-MCNC: 0.39 MG/L FEU
DIFFERENTIAL METHOD: ABNORMAL
EOSINOPHIL # BLD AUTO: 0.3 K/UL (ref 0–0.5)
EOSINOPHIL NFR BLD: 2.4 % (ref 0–8)
ERYTHROCYTE [DISTWIDTH] IN BLOOD BY AUTOMATED COUNT: 13.2 % (ref 11.5–14.5)
ERYTHROCYTE [SEDIMENTATION RATE] IN BLOOD BY WESTERGREN METHOD: 50 MM/HR (ref 0–23)
EST. GFR  (AFRICAN AMERICAN): >60 ML/MIN/1.73 M^2
EST. GFR  (NON AFRICAN AMERICAN): >60 ML/MIN/1.73 M^2
FERRITIN SERPL-MCNC: 123 NG/ML (ref 20–300)
GLUCOSE SERPL-MCNC: 208 MG/DL (ref 70–110)
HCT VFR BLD AUTO: 46.6 % (ref 40–54)
HGB BLD-MCNC: 14.3 G/DL (ref 14–18)
IMM GRANULOCYTES # BLD AUTO: 0.17 K/UL (ref 0–0.04)
IMM GRANULOCYTES NFR BLD AUTO: 1.5 % (ref 0–0.5)
INR PPP: 1 (ref 0.8–1.2)
LACTATE SERPL-SCNC: 1.7 MMOL/L (ref 0.5–2.2)
LDH SERPL L TO P-CCNC: 282 U/L (ref 110–260)
LYMPHOCYTES # BLD AUTO: 2.4 K/UL (ref 1–4.8)
LYMPHOCYTES NFR BLD: 22 % (ref 18–48)
MCH RBC QN AUTO: 28.3 PG (ref 27–31)
MCHC RBC AUTO-ENTMCNC: 30.7 G/DL (ref 32–36)
MCV RBC AUTO: 92 FL (ref 82–98)
MONOCYTES # BLD AUTO: 0.7 K/UL (ref 0.3–1)
MONOCYTES NFR BLD: 6.4 % (ref 4–15)
NEUTROPHILS # BLD AUTO: 7.4 K/UL (ref 1.8–7.7)
NEUTROPHILS NFR BLD: 67.2 % (ref 38–73)
NRBC BLD-RTO: 0 /100 WBC
PLATELET # BLD AUTO: 305 K/UL (ref 150–450)
PMV BLD AUTO: 9.3 FL (ref 9.2–12.9)
POTASSIUM SERPL-SCNC: 3.4 MMOL/L (ref 3.5–5.1)
PROCALCITONIN SERPL IA-MCNC: 0.04 NG/ML
PROT SERPL-MCNC: 7.9 G/DL (ref 6–8.4)
PROTHROMBIN TIME: 11.1 SEC (ref 9–12.5)
RBC # BLD AUTO: 5.06 M/UL (ref 4.6–6.2)
SARS-COV-2 RDRP RESP QL NAA+PROBE: NEGATIVE
SARS-COV-2 RNA RESP QL NAA+PROBE: DETECTED
SODIUM SERPL-SCNC: 138 MMOL/L (ref 136–145)
TROPONIN I SERPL DL<=0.01 NG/ML-MCNC: 0.01 NG/ML (ref 0–0.03)
WBC # BLD AUTO: 11.07 K/UL (ref 3.9–12.7)

## 2022-01-12 PROCEDURE — 99223 1ST HOSP IP/OBS HIGH 75: CPT | Mod: CR,,, | Performed by: HOSPITALIST

## 2022-01-12 PROCEDURE — 85652 RBC SED RATE AUTOMATED: CPT

## 2022-01-12 PROCEDURE — 82728 ASSAY OF FERRITIN: CPT | Performed by: EMERGENCY MEDICINE

## 2022-01-12 PROCEDURE — U0005 INFEC AGEN DETEC AMPLI PROBE: HCPCS | Performed by: EMERGENCY MEDICINE

## 2022-01-12 PROCEDURE — 80053 COMPREHEN METABOLIC PANEL: CPT | Performed by: EMERGENCY MEDICINE

## 2022-01-12 PROCEDURE — 84145 PROCALCITONIN (PCT): CPT | Performed by: EMERGENCY MEDICINE

## 2022-01-12 PROCEDURE — U0003 INFECTIOUS AGENT DETECTION BY NUCLEIC ACID (DNA OR RNA); SEVERE ACUTE RESPIRATORY SYNDROME CORONAVIRUS 2 (SARS-COV-2) (CORONAVIRUS DISEASE [COVID-19]), AMPLIFIED PROBE TECHNIQUE, MAKING USE OF HIGH THROUGHPUT TECHNOLOGIES AS DESCRIBED BY CMS-2020-01-R: HCPCS | Performed by: EMERGENCY MEDICINE

## 2022-01-12 PROCEDURE — 25000003 PHARM REV CODE 250: Performed by: EMERGENCY MEDICINE

## 2022-01-12 PROCEDURE — 85379 FIBRIN DEGRADATION QUANT: CPT

## 2022-01-12 PROCEDURE — 63600175 PHARM REV CODE 636 W HCPCS

## 2022-01-12 PROCEDURE — 86140 C-REACTIVE PROTEIN: CPT | Performed by: EMERGENCY MEDICINE

## 2022-01-12 PROCEDURE — 84484 ASSAY OF TROPONIN QUANT: CPT | Performed by: EMERGENCY MEDICINE

## 2022-01-12 PROCEDURE — 25000242 PHARM REV CODE 250 ALT 637 W/ HCPCS

## 2022-01-12 PROCEDURE — U0002 COVID-19 LAB TEST NON-CDC: HCPCS | Performed by: EMERGENCY MEDICINE

## 2022-01-12 PROCEDURE — 25000003 PHARM REV CODE 250

## 2022-01-12 PROCEDURE — 27000207 HC ISOLATION

## 2022-01-12 PROCEDURE — 83605 ASSAY OF LACTIC ACID: CPT | Performed by: EMERGENCY MEDICINE

## 2022-01-12 PROCEDURE — 93010 EKG 12-LEAD: ICD-10-PCS | Mod: ,,, | Performed by: INTERNAL MEDICINE

## 2022-01-12 PROCEDURE — 82550 ASSAY OF CK (CPK): CPT | Performed by: EMERGENCY MEDICINE

## 2022-01-12 PROCEDURE — 83880 ASSAY OF NATRIURETIC PEPTIDE: CPT | Performed by: HOSPITALIST

## 2022-01-12 PROCEDURE — 99223 PR INITIAL HOSPITAL CARE,LEVL III: ICD-10-PCS | Mod: CR,,, | Performed by: HOSPITALIST

## 2022-01-12 PROCEDURE — 85730 THROMBOPLASTIN TIME PARTIAL: CPT

## 2022-01-12 PROCEDURE — 99900035 HC TECH TIME PER 15 MIN (STAT)

## 2022-01-12 PROCEDURE — 93005 ELECTROCARDIOGRAM TRACING: CPT

## 2022-01-12 PROCEDURE — 93010 ELECTROCARDIOGRAM REPORT: CPT | Mod: ,,, | Performed by: INTERNAL MEDICINE

## 2022-01-12 PROCEDURE — 12000002 HC ACUTE/MED SURGE SEMI-PRIVATE ROOM

## 2022-01-12 PROCEDURE — 85025 COMPLETE CBC W/AUTO DIFF WBC: CPT | Performed by: EMERGENCY MEDICINE

## 2022-01-12 PROCEDURE — 94640 AIRWAY INHALATION TREATMENT: CPT

## 2022-01-12 PROCEDURE — 85610 PROTHROMBIN TIME: CPT

## 2022-01-12 PROCEDURE — 63600175 PHARM REV CODE 636 W HCPCS: Performed by: EMERGENCY MEDICINE

## 2022-01-12 PROCEDURE — 83615 LACTATE (LD) (LDH) ENZYME: CPT | Performed by: EMERGENCY MEDICINE

## 2022-01-12 RX ORDER — GLUCAGON 1 MG
1 KIT INJECTION
Status: DISCONTINUED | OUTPATIENT
Start: 2022-01-12 | End: 2022-01-16 | Stop reason: HOSPADM

## 2022-01-12 RX ORDER — PROMETHAZINE HYDROCHLORIDE AND CODEINE PHOSPHATE 6.25; 1 MG/5ML; MG/5ML
5 SOLUTION ORAL EVERY 4 HOURS PRN
Status: DISCONTINUED | OUTPATIENT
Start: 2022-01-12 | End: 2022-01-16 | Stop reason: HOSPADM

## 2022-01-12 RX ORDER — ALBUTEROL SULFATE 90 UG/1
2 AEROSOL, METERED RESPIRATORY (INHALATION) EVERY 6 HOURS
Status: DISCONTINUED | OUTPATIENT
Start: 2022-01-12 | End: 2022-01-14

## 2022-01-12 RX ORDER — HYDRALAZINE HYDROCHLORIDE 20 MG/ML
5 INJECTION INTRAMUSCULAR; INTRAVENOUS
Status: COMPLETED | OUTPATIENT
Start: 2022-01-12 | End: 2022-01-12

## 2022-01-12 RX ORDER — ASCORBIC ACID 500 MG
500 TABLET ORAL 2 TIMES DAILY
Status: DISCONTINUED | OUTPATIENT
Start: 2022-01-12 | End: 2022-01-16 | Stop reason: HOSPADM

## 2022-01-12 RX ORDER — ALBUTEROL SULFATE 0.63 MG/3ML
2.5 SOLUTION RESPIRATORY (INHALATION) EVERY 6 HOURS PRN
COMMUNITY
End: 2023-02-14 | Stop reason: SDUPTHER

## 2022-01-12 RX ORDER — POTASSIUM CHLORIDE 20 MEQ/1
40 TABLET, EXTENDED RELEASE ORAL EVERY 4 HOURS
Status: COMPLETED | OUTPATIENT
Start: 2022-01-12 | End: 2022-01-12

## 2022-01-12 RX ORDER — IBUPROFEN 200 MG
24 TABLET ORAL
Status: DISCONTINUED | OUTPATIENT
Start: 2022-01-12 | End: 2022-01-12

## 2022-01-12 RX ORDER — IBUPROFEN 200 MG
16 TABLET ORAL
Status: DISCONTINUED | OUTPATIENT
Start: 2022-01-12 | End: 2022-01-16 | Stop reason: HOSPADM

## 2022-01-12 RX ORDER — NALOXONE HCL 0.4 MG/ML
0.02 VIAL (ML) INJECTION
Status: DISCONTINUED | OUTPATIENT
Start: 2022-01-12 | End: 2022-01-16 | Stop reason: HOSPADM

## 2022-01-12 RX ORDER — GLUCAGON 1 MG
1 KIT INJECTION
Status: DISCONTINUED | OUTPATIENT
Start: 2022-01-12 | End: 2022-01-12

## 2022-01-12 RX ORDER — LOSARTAN POTASSIUM AND HYDROCHLOROTHIAZIDE 25; 100 MG/1; MG/1
1 TABLET ORAL DAILY
Status: DISCONTINUED | OUTPATIENT
Start: 2022-01-12 | End: 2022-01-15

## 2022-01-12 RX ORDER — INSULIN ASPART 100 [IU]/ML
0-5 INJECTION, SOLUTION INTRAVENOUS; SUBCUTANEOUS
Status: DISCONTINUED | OUTPATIENT
Start: 2022-01-12 | End: 2022-01-16 | Stop reason: HOSPADM

## 2022-01-12 RX ORDER — FLUTICASONE PROPIONATE 50 MCG
1 SPRAY, SUSPENSION (ML) NASAL DAILY PRN
COMMUNITY
End: 2024-02-29 | Stop reason: SDUPTHER

## 2022-01-12 RX ORDER — DEXAMETHASONE SODIUM PHOSPHATE 4 MG/ML
6 INJECTION, SOLUTION INTRA-ARTICULAR; INTRALESIONAL; INTRAMUSCULAR; INTRAVENOUS; SOFT TISSUE
Status: COMPLETED | OUTPATIENT
Start: 2022-01-12 | End: 2022-01-12

## 2022-01-12 RX ORDER — GUAIFENESIN 600 MG/1
1200 TABLET, EXTENDED RELEASE ORAL 2 TIMES DAILY PRN
COMMUNITY
End: 2024-02-29

## 2022-01-12 RX ORDER — TALC
6 POWDER (GRAM) TOPICAL NIGHTLY PRN
Status: DISCONTINUED | OUTPATIENT
Start: 2022-01-12 | End: 2022-01-13

## 2022-01-12 RX ORDER — IBUPROFEN 200 MG
16 TABLET ORAL
Status: DISCONTINUED | OUTPATIENT
Start: 2022-01-12 | End: 2022-01-12

## 2022-01-12 RX ORDER — GUAIFENESIN/DEXTROMETHORPHAN 100-10MG/5
10 SYRUP ORAL EVERY 4 HOURS PRN
Status: DISCONTINUED | OUTPATIENT
Start: 2022-01-12 | End: 2022-01-16 | Stop reason: HOSPADM

## 2022-01-12 RX ORDER — BENZONATATE 100 MG/1
100 CAPSULE ORAL 3 TIMES DAILY PRN
Status: DISCONTINUED | OUTPATIENT
Start: 2022-01-12 | End: 2022-01-16 | Stop reason: HOSPADM

## 2022-01-12 RX ORDER — ENOXAPARIN SODIUM 150 MG/ML
120 INJECTION SUBCUTANEOUS 2 TIMES DAILY
Status: DISCONTINUED | OUTPATIENT
Start: 2022-01-12 | End: 2022-01-16 | Stop reason: HOSPADM

## 2022-01-12 RX ORDER — SODIUM CHLORIDE 0.9 % (FLUSH) 0.9 %
10 SYRINGE (ML) INJECTION EVERY 12 HOURS PRN
Status: DISCONTINUED | OUTPATIENT
Start: 2022-01-12 | End: 2022-01-16 | Stop reason: HOSPADM

## 2022-01-12 RX ORDER — IBUPROFEN 200 MG
24 TABLET ORAL
Status: DISCONTINUED | OUTPATIENT
Start: 2022-01-12 | End: 2022-01-16 | Stop reason: HOSPADM

## 2022-01-12 RX ORDER — SODIUM CHLORIDE 0.9 % (FLUSH) 0.9 %
10 SYRINGE (ML) INJECTION
Status: DISCONTINUED | OUTPATIENT
Start: 2022-01-12 | End: 2022-01-16 | Stop reason: HOSPADM

## 2022-01-12 RX ADMIN — Medication 500 MG: at 08:01

## 2022-01-12 RX ADMIN — ALBUTEROL SULFATE 2 PUFF: 108 INHALANT RESPIRATORY (INHALATION) at 01:01

## 2022-01-12 RX ADMIN — ALBUTEROL SULFATE 2 PUFF: 108 INHALANT RESPIRATORY (INHALATION) at 09:01

## 2022-01-12 RX ADMIN — POTASSIUM CHLORIDE 40 MEQ: 1500 TABLET, EXTENDED RELEASE ORAL at 06:01

## 2022-01-12 RX ADMIN — REMDESIVIR 200 MG: 100 INJECTION, POWDER, LYOPHILIZED, FOR SOLUTION INTRAVENOUS at 04:01

## 2022-01-12 RX ADMIN — LOSARTAN POTASSIUM AND HYDROCHLOROTHIAZIDE 1 TABLET: 25; 100 TABLET ORAL at 08:01

## 2022-01-12 RX ADMIN — DEXAMETHASONE SODIUM PHOSPHATE 6 MG: 4 INJECTION INTRA-ARTICULAR; INTRALESIONAL; INTRAMUSCULAR; INTRAVENOUS; SOFT TISSUE at 02:01

## 2022-01-12 RX ADMIN — ALBUTEROL SULFATE 2 PUFF: 108 INHALANT RESPIRATORY (INHALATION) at 07:01

## 2022-01-12 RX ADMIN — DEXAMETHASONE 6 MG: 4 TABLET ORAL at 08:01

## 2022-01-12 RX ADMIN — MULTIPLE VITAMINS W/ MINERALS TAB 1 TABLET: TAB at 08:01

## 2022-01-12 RX ADMIN — POTASSIUM CHLORIDE 40 MEQ: 1500 TABLET, EXTENDED RELEASE ORAL at 09:01

## 2022-01-12 RX ADMIN — HYDRALAZINE HYDROCHLORIDE 5 MG: 20 INJECTION INTRAMUSCULAR; INTRAVENOUS at 01:01

## 2022-01-12 RX ADMIN — ENOXAPARIN SODIUM 120 MG: 120 INJECTION SUBCUTANEOUS at 08:01

## 2022-01-12 NOTE — HPI
Ned Brock is a 61-year-old male with past medical history of chronic bronchitis, hypertension, BPH with obstruction, and cholecystectomy who presents to the ED with 2 weeks of cough and wheeze productive of yellow sputum, headache, and positive COVID test on January 7th with oxygen saturations of 87-90% measured the home pulse ox.  Patient states that 2 weeks ago he developed a cough with wheeze that is typical for his normal chronic bronchitis exacerbations.  His wife was found to be COVID positive on January 3rd, but he was negative at that time.  The patient continued to feel ill and tested positive for COVID on January 7th.  His son-in-law gave him a home pulse ox device to measure his oxygen saturations and he was found to be measuring 87-90% improved on room air.  He called his sister who is a nurse who advised that he present to the ED given his positive COVID status, lung history, and low O2 saturations.  He states that earlier he presented to a critical access hospital where he was found to be hypoxic to 84%.  He is not on home oxygen.  Patient has a COVID risk score of 3.    In the ED patient was found to be hypertensive to 214/101, and 94% on room air.  Significant labs include K 3.4, chloride 91, bicarb 32, glucose 208 should, , . Chest x-ray showed bilateral ground-glass opacities and multifocal infectious versus noninfectious inflammatory process.  He received dexamethasone, hydralazine, and was started on remdesivir.

## 2022-01-12 NOTE — PHARMACY MED REC
"Admission Medication History     The home medication history was taken by Christine Campbell.    You may go to "Admission" then "Reconcile Home Medications" tabs to review and/or act upon these items.      The home medication list has been updated by the Pharmacy department.    Please read ALL comments highlighted in yellow.    Please address this information as you see fit.     Feel free to contact us if you have any questions or require assistance.      The medications listed below were removed from the home medication list. Please reorder if appropriate:  Patient reports no longer taking the following medication(s):   AMOX CLAV 875-125 MG   DICYCLOMINE 20 MG   DOXYCYCLINE 100 MG   HYDROCODONE/APAP 5-325 MG   MUPIROCIN 2% OINT   OMEPRAZOLE 20 MG      Medications listed below were obtained from: Patient/family  Current Outpatient Medications on File Prior to Encounter   Medication Sig    albuterol (ACCUNEB) 0.63 mg/3 mL Nebu Take 2.5 mg by nebulization every 6 (six) hours as needed (wheezing/shortness of breath). Rescue    ascorbic acid (VITAMIN C ORAL) Take 1 capsule by mouth once daily.    ergocalciferol, vitamin D2, (VITAMIN D ORAL) Take 1 capsule by mouth once daily.    fluticasone propionate (FLONASE) 50 mcg/actuation nasal spray 1 spray by Each Nostril route daily as needed for Rhinitis.    guaiFENesin (MUCINEX) 600 mg 12 hr tablet Take 1,200 mg by mouth 2 (two) times daily as needed for Congestion.    guaifenesin/dextromethorphan (CORICIDIN HBP CHEST AKILAH-COUGH ORAL) Take by mouth daily as needed (cough and cold).    losartan-hydrochlorothiazide 100-25 mg (HYZAAR) 100-25 mg per tablet Take 1 tablet by mouth once daily.    ZINC ORAL Take 1 tablet by mouth once daily.         Christine Campbell  EXT [id number]                    .          "

## 2022-01-12 NOTE — H&P
[facility] - Emergency Dept  [facility] Medicine  History & Physical    Patient Name: Ned Brock  MRN: [id number]  Patient Class: IP- Inpatient  Admission Date: 1/12/2022  Attending Physician: Caryn Mcclain MD   Primary Care Provider: Marcelo Head MD         Patient information was obtained from patient, spouse/SO and ER records.     Subjective:     Principal Problem:Pneumonia due to COVID-19 virus    Chief Complaint:   Chief Complaint   Patient presents with    Covid 19     Pt sent from [facility] to be admitted after being dx with pneumonia. Pt covid positive. SPO2 dropped when ambulating to 84%. Pt satting 94% in triage on RA.        HPI: Ned Brock is a 61-year-old male with past medical history of chronic bronchitis, hypertension, BPH with obstruction, and cholecystectomy who presents to the ED with 2 weeks of cough and wheeze productive of yellow sputum, headache, and positive COVID test on January 7th with oxygen saturations of 87-90% measured the home pulse ox.  Patient states that 2 weeks ago he developed a cough with wheeze that is typical for his normal chronic bronchitis exacerbations.  His wife was found to be COVID positive on January 3rd, but he was negative at that time.  The patient continued to feel ill and tested positive for COVID on January 7th.  His son-in-law gave him a home pulse ox device to measure his oxygen saturations and he was found to be measuring 87-90% improved on room air.  He called his sister who is a nurse who advised that he present to the ED given his positive COVID status, lung history, and low O2 saturations.  He states that earlier he presented to a critical access hospital where he was found to be hypoxic to 84%.  He is not on home oxygen.  Patient has a COVID risk score of 3.    In the ED patient was found to be hypertensive to 214/101, and 94% on room air.  Significant labs include K 3.4, chloride 91, bicarb 32, glucose 208 should, , .  Chest x-ray showed bilateral ground-glass opacities and multifocal infectious versus noninfectious inflammatory process.  He received dexamethasone, hydralazine, and was started on remdesivir.      Past Medical History:   Diagnosis Date    BPH without obstruction/lower urinary tract symptoms     Elevated PSA     Hypertension        Past Surgical History:   Procedure Laterality Date    CHOLECYSTECTOMY      CYST REMOVAL      EYE SURGERY         Review of patient's allergies indicates:  No Known Allergies    No current facility-administered medications on file prior to encounter.     Current Outpatient Medications on File Prior to Encounter   Medication Sig    albuterol (ACCUNEB) 0.63 mg/3 mL Nebu Take 2.5 mg by nebulization every 6 (six) hours as needed. Rescue    amoxicillin-clavulanate 875-125mg (AUGMENTIN) 875-125 mg per tablet Take 1 tablet by mouth every 12 (twelve) hours.    cyclobenzaprine (FLEXERIL) 10 MG tablet Take 10 mg by mouth 3 (three) times daily as needed for Muscle spasms.    dicyclomine (BENTYL) 20 mg tablet Take 20 mg by mouth every 6 (six) hours.    doxycycline (VIBRAMYCIN) 100 MG Cap Take 100 mg by mouth 2 (two) times daily.    hydrocodone-acetaminophen 5-325mg (NORCO) 5-325 mg per tablet Take 1-2 tablets by mouth every 4 (four) hours as needed for Pain.    losartan-hydrochlorothiazide 100-25 mg (HYZAAR) 100-25 mg per tablet Take 1 tablet by mouth once daily.    mupirocin (BACTROBAN) 2 % ointment     omeprazole (PRILOSEC) 20 MG capsule Take 20 mg by mouth once daily.     Family History     Problem Relation (Age of Onset)    Breast cancer Mother        Tobacco Use    Smoking status: Never Smoker    Smokeless tobacco: Never Used   Substance and Sexual Activity    Alcohol use: No    Drug use: No    Sexual activity: Not on file     Review of Systems   Constitutional: Positive for chills and fever.   HENT: Negative for congestion, hearing loss, postnasal drip, sinus pressure and  sore throat.    Eyes: Negative for pain and visual disturbance.   Respiratory: Positive for cough, shortness of breath and wheezing.    Cardiovascular: Negative for chest pain, palpitations and leg swelling.   Gastrointestinal: Positive for diarrhea. Negative for abdominal pain, constipation, nausea and vomiting.   Genitourinary: Negative for difficulty urinating, dysuria, frequency and urgency.   Skin: Negative for rash.   Neurological: Positive for headaches. Negative for dizziness, seizures, syncope and numbness.   Psychiatric/Behavioral: Negative for confusion and hallucinations.     Objective:     Vital Signs (Most Recent):  Temp: 98.6 °F (37 °C) (01/11/22 2354)  Pulse: 89 (01/12/22 0404)  Resp: (!) 22 (01/12/22 0221)  BP: (!) 150/84 (01/12/22 0404)  SpO2: 96 % (01/12/22 0221) Vital Signs (24h Range):  Temp:  [98.6 °F (37 °C)] 98.6 °F (37 °C)  Pulse:  [78-92] 89  Resp:  [16-22] 22  SpO2:  [90 %-96 %] 96 %  BP: (149-214)/() 150/84     Weight: 127.9 kg (282 lb)  Body mass index is 40.46 kg/m².    Physical Exam  Constitutional:       Appearance: He is obese.   HENT:      Head: Normocephalic and atraumatic.      Mouth/Throat:      Mouth: Mucous membranes are moist.   Eyes:      General: No scleral icterus.     Extraocular Movements: Extraocular movements intact.      Pupils: Pupils are equal, round, and reactive to light.   Neurological:      Mental Status: He is alert.           CRANIAL NERVES     CN III, IV, VI   Pupils are equal, round, and reactive to light.       Significant Labs: All pertinent labs within the past 24 hours have been reviewed.    Significant Imaging: I have reviewed all pertinent imaging results/findings within the past 24 hours.    Assessment/Plan:     * Pneumonia due to COVID-19 virus  Patient presents to the ED today with 2 weeks cough that he says are similar to his normal chronic bronchitis exacerbations.  Says his wife tested positive for COVID on January 3rd.  At that time he  tested negative.  He tested positive for Covid on January 7th after continuing to feel short of breath and not spiking a fever.  He was given a at home pulse ox by family member, and noted that it was reading from 87-90.  Contact the sister was a nurse who suggested that he present to the emergency department.  On arrival to the emergency department patient was found to be hypertensive to 214/101 and satting at 94% on room air.  Patient has a COVID risk score of 3.    -- Covid isolation   -- dexamethasone 6 mg daily for 10 days  -- remdesivir 200 mg load on day 1, followed by 100 mg daily for days 2 through 5  -- vitamin-C 500 mg b.i.d. daily  -- multivitamin daily  -- enoxaparin 120mg b.i.d.  -- supplemental oxygen p.r.n. with oxygen goal >92%    Hypokalemia  Patient presented to the ED with K of 3.4.    -- replace p.r.n.  -- trend CMP      Essential hypertension  -- Continue home Losartan-HCTZ 100-25mg daily    Chronic bronchitis  Patient states he has long standing history of Chronic bronchitis. He does not remember when it was first diagnosed. Takes albuterol breathing treatment PRN. States that he began experiencing his usual bronchitis cough and wheeze 2 weeks prior to presentation to ED and that his current symptoms are similar to his normal exacerbation symptoms. He does not use O2 at home.     -- On 2L NC in ED O2 sats of 96%.  -- Duo-nebs q6 PRN while wake  -- Wean O2 as tolerated. Goal O2 88-92%      VTE Risk Mitigation (From admission, onward)         Ordered     enoxaparin injection 120 mg  2 times daily         01/12/22 5406                   Javon Lacey MD   PGY-1  Department of Hospital Medicine   [facility] - Emergency Dept

## 2022-01-12 NOTE — ED NOTES
Ned Brock, a 61 y.o. male with wife at bedside      Patient identifiers verified verbally with patient and correct for Ned Brock.    LOC/ APPEARANCE: The patient is AAOx4. Pt is speaking appropriately, no slurred speech. Pt changed into hospital gown. Continuous cardiac monitor, cont pulse ox, and auto BP cuff applied to patient. Pt is clean and well groomed. No JVD visible. Pt reports pain level of 0. Pt updated on POC. Bed low and locked with side rails up x2, call bell in pt reach.  SKIN: Skin is warm dry and intact, and color is consistent with ethnicity. Capillary refill <3 seconds. No breakdown or brusing visible. Mucus membranes moist, acyanotic.  RESPIRATORY: Airway is open and patent. Respirations-spontaneous, unlabored, regular rate, equal bilaterally on inspiration and expiration. No accessory muscle use noted. Cough noted, non-productive.   CARDIAC: Patient has regular heart rate.  No peripheral edema noted, and patient has no c/o chest pain. Peripheral pulses present equal and strong throughout.  ABDOMEN: Soft and non-tender to palpation with no distention noted. Normoactive bowel sounds x4 quadrants. Pt has no complaints of abnormal bowel movements, denies blood in stool. Pt reports normal appetite.   NEUROLOGIC: Eyes open spontaneously and facial expression symmetrical. Pt behavior appropriate to situation, and pt follows commands. Pt reports sensation present in all extremities when touched with a finger, denies any numbness or tingling. PERRLA  MUSCULOSKELETAL: Spontaneous movement noted to all extremities. Hand  equal and leg strength strong +5 bilaterally.   : No complaints of frequency, burning, urgency or blood in the urine. No complaints of incontinence.

## 2022-01-12 NOTE — ASSESSMENT & PLAN NOTE
Patient states he has long standing history of Chronic bronchitis. He does not remember when it was first diagnosed. Takes albuterol breathing treatment PRN. States that he began experiencing his usual bronchitis cough and wheeze 2 weeks prior to presentation to ED and that his current symptoms are similar to his normal exacerbation symptoms. He does not use O2 at home.     -- On 2L NC in ED O2 sats of 96%.  -- Duo-nebs q6 PRN while wake  -- Wean O2 as tolerated. Goal O2 88-92%

## 2022-01-12 NOTE — ED NOTES
Patient care assumed. Patient placed on cardiac monitor, bp cuff, and continuous pulse ox. Call light within reach. Family at bedside. Patient placed on 2L nasal cannula.

## 2022-01-12 NOTE — ED PROVIDER NOTES
Source of History:  Patient  Chart    Chief complaint:  Covid 19 (Pt sent from [facility] to be admitted after being dx with pneumonia. Pt covid positive. SPO2 dropped when ambulating to 84%. Pt satting 94% in triage on RA.)      HPI:  Ned Brock is a 61 y.o. male with history of BPH with obstruction, hypertension, elevated PSA, cholecystectomy, bronchitis presenting to emergency department with complaint of shortness of breath, cough productive of yellow sputum, headache, and fatigue.  No history of smoking.    Patient states that he is vaccinated for COVI D. He states that his wife was diagnosed with COVID approximately 10 days ago.  He took a negative COVID test 10 days ago, and then 5 days ago, last Friday, developed symptoms including productive cough, shortness of breath, headache, fatigue.  He had 1 fever last night.  No chest pain, abdominal pain, vomiting, and diarrhea.  No hemoptysis.  No dysuria hematuria.  No rashes.    This patient states that he presented to a critical access hospital earlier this evening.  States that he was found to be hypoxic to 84%, and COVID positive.  He left the hospital against medical advice to come here for further management.    Of note, patient's daughter, who was pregnant,  of COVID 1 year ago.  Their grandson unfortunately has multiple birth defects, for which he is still hospitalized.  Patient and his wife note that there very anxious about this new COVID diagnosis, given their family history.    ROS: As per HPI and below:  Review of Systems   Constitutional: Positive for fever and malaise/fatigue.   HENT: Negative for sore throat.    Eyes: Negative for double vision.   Respiratory: Positive for cough, sputum production and shortness of breath.    Cardiovascular: Negative for chest pain.   Gastrointestinal: Negative for abdominal pain and vomiting.   Genitourinary: Negative for dysuria.   Musculoskeletal: Negative for falls.   Skin: Negative for rash.    Neurological: Positive for headaches.       Review of patient's allergies indicates:  No Known Allergies    No current facility-administered medications on file prior to encounter.     Current Outpatient Medications on File Prior to Encounter   Medication Sig Dispense Refill    amoxicillin-clavulanate 875-125mg (AUGMENTIN) 875-125 mg per tablet Take 1 tablet by mouth every 12 (twelve) hours.      cyclobenzaprine (FLEXERIL) 10 MG tablet Take 10 mg by mouth 3 (three) times daily as needed for Muscle spasms.      dicyclomine (BENTYL) 20 mg tablet Take 20 mg by mouth every 6 (six) hours.      doxycycline (VIBRAMYCIN) 100 MG Cap Take 100 mg by mouth 2 (two) times daily.      hydrocodone-acetaminophen 5-325mg (NORCO) 5-325 mg per tablet Take 1-2 tablets by mouth every 4 (four) hours as needed for Pain. 50 tablet 0    losartan-hydrochlorothiazide 100-25 mg (HYZAAR) 100-25 mg per tablet Take 1 tablet by mouth once daily.      mupirocin (BACTROBAN) 2 % ointment       omeprazole (PRILOSEC) 20 MG capsule Take 20 mg by mouth once daily.         PMH:  As per HPI and below:  Past Medical History:   Diagnosis Date    BPH without obstruction/lower urinary tract symptoms     Elevated PSA     Hypertension      Past Surgical History:   Procedure Laterality Date    CHOLECYSTECTOMY      CYST REMOVAL      EYE SURGERY         Social History     Socioeconomic History    Marital status:    Tobacco Use    Smoking status: Never Smoker    Smokeless tobacco: Never Used   Substance and Sexual Activity    Alcohol use: No    Drug use: No       Family History   Problem Relation Age of Onset    Breast cancer Mother        Physical Exam:      Vitals:    01/12/22 0221   BP: (!) 149/67   Pulse: 79   Resp: (!) 22   Temp:      Gen: No acute distress.  Nontoxic.  Well appearing.  Anxious.  Mental Status:  Alert and oriented .  Appropriate, conversant.  Skin: Warm, dry. No rashes seen.  Eyes: No conjunctival injection.  Pulm:   Hypoxia to 90% with ambulation.  No increased work of breathing.  No significant tachypnea.  No audible stridor or wheezing.  No conversational dyspnea.    CV: Regular rate. Regular rhythm.   Abd: Soft.  Not distended.  Nontender.   MSK: Good range of motion all joints.  No deformities.    Neuro: Awake. Speech normal. No focal neuro deficit observed.    Laboratory Studies:  Labs Reviewed   CBC W/ AUTO DIFFERENTIAL - Abnormal; Notable for the following components:       Result Value    MCHC 30.7 (*)     Immature Granulocytes 1.5 (*)     Immature Grans (Abs) 0.17 (*)     All other components within normal limits   COMPREHENSIVE METABOLIC PANEL - Abnormal; Notable for the following components:    Potassium 3.4 (*)     Chloride 91 (*)     CO2 32 (*)     Glucose 208 (*)     AST 44 (*)     ALT 79 (*)     All other components within normal limits   C-REACTIVE PROTEIN - Abnormal; Notable for the following components:    CRP 25.7 (*)     All other components within normal limits   LACTATE DEHYDROGENASE - Abnormal; Notable for the following components:     (*)     All other components within normal limits   CK - Abnormal; Notable for the following components:     (*)     All other components within normal limits   FERRITIN   LACTIC ACID, PLASMA   TROPONIN I   PROCALCITONIN   B-TYPE NATRIURETIC PEPTIDE   SARS-COV-2 (COVID-19) QUALITATIVE PCR   SARS-COV-2 RDRP GENE    Narrative:     This test utilizes isothermal nucleic acid amplification   technology to detect the SARS-CoV-2 RdRp nucleic acid segment.   The analytical sensitivity (limit of detection) is 125 genome   equivalents/mL.   A POSITIVE result implies infection with the SARS-CoV-2 virus;   the patient is presumed to be contagious.     A NEGATIVE result means that SARS-CoV-2 nucleic acids are not   present above the limit of detection. A NEGATIVE result should be   treated as presumptive. It does not rule out the possibility of   COVID-19 and should not be  the sole basis for treatment decisions.   If COVID-19 is strongly suspected based on clinical and exposure   history, re-testing using an alternate molecular assay should be   considered.   This test is only for use under the Food and Drug   Administration s Emergency Use Authorization (EUA).   Commercial kits are provided by [employer].   Performance characteristics of the EUA have been independently   verified by [facility] Department of   Pathology and Laboratory Medicine.   _________________________________________________________________   The authorized Fact Sheet for Healthcare Providers and the authorized Fact   Sheet for Patients of the ID NOW COVID-19 are available on the FDA   website:     https://www.fda.gov/media/131485/download  https://www.fda.gov/media/027818/download           EKG (independently interpreted by me):  Normal sinus rhythm, rate 94.  This STEM I. QRS 86 milliseconds.   milliseconds.    X-rays (independently interpreted by me):  Consistent with COVID pneumonia    Chart reviewed.  See summary in HPI    Imaging Results          X-Ray Chest AP Portable (Final result)  Result time 01/12/22 02:22:16    Final result by Skip Kim MD (01/12/22 02:22:16)                 Impression:      Suspected mild hazy bilateral ground-glass opacities which are nonspecific and may relate to multifocal infection, edema, or noninfectious inflammatory process.      Electronically signed by: Skip Kim MD  Date:    01/12/2022  Time:    02:22             Narrative:    EXAMINATION:  XR CHEST AP PORTABLE    CLINICAL HISTORY:  COVID-19;    TECHNIQUE:  Single frontal view of the chest was performed.    COMPARISON:  None    FINDINGS:  Cardiac monitoring leads overlie the chest.  The cardiac silhouette is mildly enlarged.  There are suspected mild hazy ground-glass opacities throughout the bilateral mid and lower lung zones.  No significant volume of pleural fluid appreciated.   No definite pneumothorax identified.  Osseous structures demonstrate degenerative changes.                                Medications Given:  Medications   remdesivir 200 mg in sodium chloride 0.9% 250 mL infusion (has no administration in time range)     Followed by   remdesivir 100 mg in sodium chloride 0.9% 250 mL infusion (has no administration in time range)   hydrALAZINE injection 5 mg (5 mg Intravenous Given 1/12/22 0142)   dexamethasone injection 6 mg (6 mg Intravenous Given 1/12/22 0241)       Discussed with:  Internal medicine    MDM:    61 y.o. male with known COVID-19, presenting with hypoxia.  Here satting 90% with ambulation.  He is afebrile, stable, nontoxic.  Reassuring work of breathing, doing well on nasal cannula.    Surprisingly, his rapid COVID test here is negative.  will send routine swab.    His labs reviewed.  Labs and chest x-ray are consistent with COVID-19.  I discussed this case with Internal Medicine, will give Decadron and removed as severe, as there was a recent COVID test at home that was positive, and unfortunately I am unable to get information from the hospital that he was at previously, although presumably they tested him for COVID there is well.    Admitted to Internal Medicine in stable condition.    Diagnostic Impression:    1. COVID-19    2. Hypoxia         ED Disposition Condition    Admit              Patient and family understands the plan and is in agreement, verbalized understanding, questions answered    Abida Waldron MD  Emergency Medicine       Abida Waldron MD  01/12/22 9095

## 2022-01-12 NOTE — HOSPITAL COURSE
Patient admitted for acute respiratory failure secondary to COVID viral pneumonia. Patient started on dexamethasone and remdesivir for COVID treatment. He was started on lisinopril 5mg daily. He also received hydralazine for elevated Bps on admission. He was found to have a A1c 9.8 and underwent nutrition counseling for diabetes. Patient with elevated LFTs that were trending up while inpatient, presumed to be secondary to remdesivir. Outpatient CMP ordered before his PCP follow-up to evaluate LFTs. Patient qualified for home oxygen via 6 minute walk test. Home oxygen ordered. Ambulatory sleep medicine referral sent for sleep study to evaluate for YANETH. Patient asked to receive flu and COVID booster vaccines upon discharge. Patient discharged in stable condition with PRN medications for cough and COVID surveillance.

## 2022-01-12 NOTE — SUBJECTIVE & OBJECTIVE
Past Medical History:   Diagnosis Date    BPH without obstruction/lower urinary tract symptoms     Elevated PSA     Hypertension        Past Surgical History:   Procedure Laterality Date    CHOLECYSTECTOMY      CYST REMOVAL      EYE SURGERY         Review of patient's allergies indicates:  No Known Allergies    No current facility-administered medications on file prior to encounter.     Current Outpatient Medications on File Prior to Encounter   Medication Sig    albuterol (ACCUNEB) 0.63 mg/3 mL Nebu Take 2.5 mg by nebulization every 6 (six) hours as needed. Rescue    amoxicillin-clavulanate 875-125mg (AUGMENTIN) 875-125 mg per tablet Take 1 tablet by mouth every 12 (twelve) hours.    cyclobenzaprine (FLEXERIL) 10 MG tablet Take 10 mg by mouth 3 (three) times daily as needed for Muscle spasms.    dicyclomine (BENTYL) 20 mg tablet Take 20 mg by mouth every 6 (six) hours.    doxycycline (VIBRAMYCIN) 100 MG Cap Take 100 mg by mouth 2 (two) times daily.    hydrocodone-acetaminophen 5-325mg (NORCO) 5-325 mg per tablet Take 1-2 tablets by mouth every 4 (four) hours as needed for Pain.    losartan-hydrochlorothiazide 100-25 mg (HYZAAR) 100-25 mg per tablet Take 1 tablet by mouth once daily.    mupirocin (BACTROBAN) 2 % ointment     omeprazole (PRILOSEC) 20 MG capsule Take 20 mg by mouth once daily.     Family History     Problem Relation (Age of Onset)    Breast cancer Mother        Tobacco Use    Smoking status: Never Smoker    Smokeless tobacco: Never Used   Substance and Sexual Activity    Alcohol use: No    Drug use: No    Sexual activity: Not on file     Review of Systems   Constitutional: Positive for chills and fever.   HENT: Negative for congestion, hearing loss, postnasal drip, sinus pressure and sore throat.    Eyes: Negative for pain and visual disturbance.   Respiratory: Positive for cough, shortness of breath and wheezing.    Cardiovascular: Negative for chest pain, palpitations and leg  swelling.   Gastrointestinal: Positive for diarrhea. Negative for abdominal pain, constipation, nausea and vomiting.   Genitourinary: Negative for difficulty urinating, dysuria, frequency and urgency.   Skin: Negative for rash.   Neurological: Positive for headaches. Negative for dizziness, seizures, syncope and numbness.   Psychiatric/Behavioral: Negative for confusion and hallucinations.     Objective:     Vital Signs (Most Recent):  Temp: 98.6 °F (37 °C) (01/11/22 2354)  Pulse: 89 (01/12/22 0404)  Resp: (!) 22 (01/12/22 0221)  BP: (!) 150/84 (01/12/22 0404)  SpO2: 96 % (01/12/22 0221) Vital Signs (24h Range):  Temp:  [98.6 °F (37 °C)] 98.6 °F (37 °C)  Pulse:  [78-92] 89  Resp:  [16-22] 22  SpO2:  [90 %-96 %] 96 %  BP: (149-214)/() 150/84     Weight: 127.9 kg (282 lb)  Body mass index is 40.46 kg/m².    Physical Exam  Constitutional:       Appearance: He is obese.   HENT:      Head: Normocephalic and atraumatic.      Mouth/Throat:      Mouth: Mucous membranes are moist.   Eyes:      General: No scleral icterus.     Extraocular Movements: Extraocular movements intact.      Pupils: Pupils are equal, round, and reactive to light.   Neurological:      Mental Status: He is alert.           CRANIAL NERVES     CN III, IV, VI   Pupils are equal, round, and reactive to light.       Significant Labs: All pertinent labs within the past 24 hours have been reviewed.    Significant Imaging: I have reviewed all pertinent imaging results/findings within the past 24 hours.

## 2022-01-13 PROBLEM — E11.9 TYPE 2 DIABETES MELLITUS: Chronic | Status: ACTIVE | Noted: 2022-01-13

## 2022-01-13 PROBLEM — J96.01 ACUTE HYPOXEMIC RESPIRATORY FAILURE: Status: ACTIVE | Noted: 2022-01-13

## 2022-01-13 PROBLEM — E11.9 TYPE 2 DIABETES MELLITUS: Status: ACTIVE | Noted: 2022-01-13

## 2022-01-13 LAB
ALBUMIN SERPL BCP-MCNC: 3.3 G/DL (ref 3.5–5.2)
ALP SERPL-CCNC: 88 U/L (ref 55–135)
ALT SERPL W/O P-5'-P-CCNC: 59 U/L (ref 10–44)
ANION GAP SERPL CALC-SCNC: 13 MMOL/L (ref 8–16)
AST SERPL-CCNC: 34 U/L (ref 10–40)
BASOPHILS # BLD AUTO: 0.05 K/UL (ref 0–0.2)
BASOPHILS NFR BLD: 0.4 % (ref 0–1.9)
BILIRUB SERPL-MCNC: 0.3 MG/DL (ref 0.1–1)
BUN SERPL-MCNC: 21 MG/DL (ref 6–20)
CALCIUM SERPL-MCNC: 9.5 MG/DL (ref 8.7–10.5)
CHLORIDE SERPL-SCNC: 93 MMOL/L (ref 95–110)
CO2 SERPL-SCNC: 33 MMOL/L (ref 23–29)
CREAT SERPL-MCNC: 0.7 MG/DL (ref 0.5–1.4)
DIFFERENTIAL METHOD: ABNORMAL
EOSINOPHIL # BLD AUTO: 0 K/UL (ref 0–0.5)
EOSINOPHIL NFR BLD: 0.2 % (ref 0–8)
ERYTHROCYTE [DISTWIDTH] IN BLOOD BY AUTOMATED COUNT: 13.4 % (ref 11.5–14.5)
EST. GFR  (AFRICAN AMERICAN): >60 ML/MIN/1.73 M^2
EST. GFR  (NON AFRICAN AMERICAN): >60 ML/MIN/1.73 M^2
ESTIMATED AVG GLUCOSE: 235 MG/DL (ref 68–131)
GLUCOSE SERPL-MCNC: 187 MG/DL (ref 70–110)
HBA1C MFR BLD: 9.8 % (ref 4–5.6)
HCT VFR BLD AUTO: 46.2 % (ref 40–54)
HGB BLD-MCNC: 14 G/DL (ref 14–18)
IMM GRANULOCYTES # BLD AUTO: 0.12 K/UL (ref 0–0.04)
IMM GRANULOCYTES NFR BLD AUTO: 0.9 % (ref 0–0.5)
LYMPHOCYTES # BLD AUTO: 2.5 K/UL (ref 1–4.8)
LYMPHOCYTES NFR BLD: 19.4 % (ref 18–48)
MAGNESIUM SERPL-MCNC: 2 MG/DL (ref 1.6–2.6)
MCH RBC QN AUTO: 28.4 PG (ref 27–31)
MCHC RBC AUTO-ENTMCNC: 30.3 G/DL (ref 32–36)
MCV RBC AUTO: 94 FL (ref 82–98)
MONOCYTES # BLD AUTO: 0.8 K/UL (ref 0.3–1)
MONOCYTES NFR BLD: 5.8 % (ref 4–15)
NEUTROPHILS # BLD AUTO: 9.6 K/UL (ref 1.8–7.7)
NEUTROPHILS NFR BLD: 73.3 % (ref 38–73)
NRBC BLD-RTO: 0 /100 WBC
PHOSPHATE SERPL-MCNC: 3.8 MG/DL (ref 2.7–4.5)
PLATELET # BLD AUTO: 376 K/UL (ref 150–450)
PMV BLD AUTO: 9.9 FL (ref 9.2–12.9)
POCT GLUCOSE: 189 MG/DL (ref 70–110)
POCT GLUCOSE: 239 MG/DL (ref 70–110)
POCT GLUCOSE: 246 MG/DL (ref 70–110)
POCT GLUCOSE: 324 MG/DL (ref 70–110)
POCT GLUCOSE: 351 MG/DL (ref 70–110)
POTASSIUM SERPL-SCNC: 4 MMOL/L (ref 3.5–5.1)
PROT SERPL-MCNC: 7.3 G/DL (ref 6–8.4)
RBC # BLD AUTO: 4.93 M/UL (ref 4.6–6.2)
SODIUM SERPL-SCNC: 139 MMOL/L (ref 136–145)
WBC # BLD AUTO: 13.04 K/UL (ref 3.9–12.7)

## 2022-01-13 PROCEDURE — 99900035 HC TECH TIME PER 15 MIN (STAT)

## 2022-01-13 PROCEDURE — 94640 AIRWAY INHALATION TREATMENT: CPT

## 2022-01-13 PROCEDURE — 12000002 HC ACUTE/MED SURGE SEMI-PRIVATE ROOM

## 2022-01-13 PROCEDURE — 99233 PR SUBSEQUENT HOSPITAL CARE,LEVL III: ICD-10-PCS | Mod: ,,, | Performed by: STUDENT IN AN ORGANIZED HEALTH CARE EDUCATION/TRAINING PROGRAM

## 2022-01-13 PROCEDURE — 63600175 PHARM REV CODE 636 W HCPCS

## 2022-01-13 PROCEDURE — 25000003 PHARM REV CODE 250

## 2022-01-13 PROCEDURE — 27000221 HC OXYGEN, UP TO 24 HOURS

## 2022-01-13 PROCEDURE — 27000207 HC ISOLATION

## 2022-01-13 PROCEDURE — 83735 ASSAY OF MAGNESIUM: CPT

## 2022-01-13 PROCEDURE — 25000003 PHARM REV CODE 250: Performed by: EMERGENCY MEDICINE

## 2022-01-13 PROCEDURE — 84100 ASSAY OF PHOSPHORUS: CPT

## 2022-01-13 PROCEDURE — 85025 COMPLETE CBC W/AUTO DIFF WBC: CPT

## 2022-01-13 PROCEDURE — 63600175 PHARM REV CODE 636 W HCPCS: Performed by: EMERGENCY MEDICINE

## 2022-01-13 PROCEDURE — 80053 COMPREHEN METABOLIC PANEL: CPT

## 2022-01-13 PROCEDURE — 83036 HEMOGLOBIN GLYCOSYLATED A1C: CPT

## 2022-01-13 PROCEDURE — 99233 SBSQ HOSP IP/OBS HIGH 50: CPT | Mod: ,,, | Performed by: STUDENT IN AN ORGANIZED HEALTH CARE EDUCATION/TRAINING PROGRAM

## 2022-01-13 PROCEDURE — 25000003 PHARM REV CODE 250: Performed by: STUDENT IN AN ORGANIZED HEALTH CARE EDUCATION/TRAINING PROGRAM

## 2022-01-13 PROCEDURE — 94761 N-INVAS EAR/PLS OXIMETRY MLT: CPT

## 2022-01-13 RX ORDER — HYDROXYZINE HYDROCHLORIDE 25 MG/1
25 TABLET, FILM COATED ORAL 3 TIMES DAILY PRN
Status: DISCONTINUED | OUTPATIENT
Start: 2022-01-13 | End: 2022-01-16 | Stop reason: HOSPADM

## 2022-01-13 RX ORDER — BLOOD-GLUCOSE CONTROL, NORMAL
EACH MISCELLANEOUS
Qty: 100 EACH | Refills: 11 | Status: SHIPPED | OUTPATIENT
Start: 2022-01-13 | End: 2022-04-18

## 2022-01-13 RX ORDER — AMLODIPINE BESYLATE 5 MG/1
5 TABLET ORAL DAILY
Status: DISCONTINUED | OUTPATIENT
Start: 2022-01-13 | End: 2022-01-15

## 2022-01-13 RX ORDER — DEXTROSE 4 G
TABLET,CHEWABLE ORAL
Qty: 1 EACH | Refills: 0 | Status: SHIPPED | OUTPATIENT
Start: 2022-01-13 | End: 2022-01-20 | Stop reason: SDUPTHER

## 2022-01-13 RX ORDER — TALC
6 POWDER (GRAM) TOPICAL NIGHTLY
Status: DISCONTINUED | OUTPATIENT
Start: 2022-01-13 | End: 2022-01-16 | Stop reason: HOSPADM

## 2022-01-13 RX ORDER — LANCING DEVICE
EACH MISCELLANEOUS
Qty: 1 EACH | Refills: 0 | Status: SHIPPED | OUTPATIENT
Start: 2022-01-13 | End: 2022-04-18

## 2022-01-13 RX ADMIN — MELATONIN TAB 3 MG 6 MG: 3 TAB at 09:01

## 2022-01-13 RX ADMIN — ALBUTEROL SULFATE 2 PUFF: 108 INHALANT RESPIRATORY (INHALATION) at 07:01

## 2022-01-13 RX ADMIN — DEXAMETHASONE 6 MG: 4 TABLET ORAL at 08:01

## 2022-01-13 RX ADMIN — MULTIPLE VITAMINS W/ MINERALS TAB 1 TABLET: TAB at 08:01

## 2022-01-13 RX ADMIN — LOSARTAN POTASSIUM AND HYDROCHLOROTHIAZIDE 1 TABLET: 25; 100 TABLET ORAL at 08:01

## 2022-01-13 RX ADMIN — Medication 500 MG: at 08:01

## 2022-01-13 RX ADMIN — Medication 500 MG: at 09:01

## 2022-01-13 RX ADMIN — GUAIFENESIN AND DEXTROMETHORPHAN 10 ML: 100; 10 SYRUP ORAL at 02:01

## 2022-01-13 RX ADMIN — ENOXAPARIN SODIUM 120 MG: 120 INJECTION SUBCUTANEOUS at 09:01

## 2022-01-13 RX ADMIN — INSULIN ASPART 1 UNITS: 100 INJECTION, SOLUTION INTRAVENOUS; SUBCUTANEOUS at 12:01

## 2022-01-13 RX ADMIN — ALBUTEROL SULFATE 2 PUFF: 108 INHALANT RESPIRATORY (INHALATION) at 01:01

## 2022-01-13 RX ADMIN — ALBUTEROL SULFATE 2 PUFF: 108 INHALANT RESPIRATORY (INHALATION) at 02:01

## 2022-01-13 RX ADMIN — AMLODIPINE BESYLATE 5 MG: 5 TABLET ORAL at 02:01

## 2022-01-13 RX ADMIN — INSULIN ASPART 2 UNITS: 100 INJECTION, SOLUTION INTRAVENOUS; SUBCUTANEOUS at 12:01

## 2022-01-13 RX ADMIN — ENOXAPARIN SODIUM 120 MG: 120 INJECTION SUBCUTANEOUS at 08:01

## 2022-01-13 RX ADMIN — REMDESIVIR 100 MG: 100 INJECTION, POWDER, LYOPHILIZED, FOR SOLUTION INTRAVENOUS at 10:01

## 2022-01-13 RX ADMIN — INSULIN ASPART 2 UNITS: 100 INJECTION, SOLUTION INTRAVENOUS; SUBCUTANEOUS at 09:01

## 2022-01-13 NOTE — PLAN OF CARE
Problem: Adult Inpatient Plan of Care  Goal: Readiness for Transition of Care  Outcome: Ongoing, Progressing     Problem: Diabetes Comorbidity  Goal: Blood Glucose Level Within Targeted Range  Outcome: Ongoing, Progressing   Pt remains on 2L O2. Monitoring BP. Amlodipine given. Pt ambulating in the room. Call light in reach. Bed locked and in lowest position.

## 2022-01-13 NOTE — PLAN OF CARE
Patient arrived to the unit from the ED via transport. Aaox4. Vitals wnl. Afebrile. O2 sats 88-89% on room air. Telemetry box in place. Independent with ambulation and ADLs. Oriented to room and call light system. Ordered meds given. Patient currently in shower. Monitoring.

## 2022-01-13 NOTE — PROGRESS NOTES
[facility] ([address] Medicine  Progress Note    Patient Name: Ned Brock  MRN: [id number]  Patient Class: IP- Inpatient   Admission Date: 1/12/2022  Length of Stay: 1 days  Attending Physician: Reed Vela MD  Primary Care Provider: Marcelo Head MD        Subjective:     Principal Problem:Pneumonia due to COVID-19 virus    HPI:  Ned Brock is a 61-year-old male with past medical history of chronic bronchitis, hypertension, BPH with obstruction, and cholecystectomy who presents to the ED with 2 weeks of cough and wheeze productive of yellow sputum, headache, and positive COVID test on January 7th with oxygen saturations of 87-90% measured the home pulse ox.  Patient states that 2 weeks ago he developed a cough with wheeze that is typical for his normal chronic bronchitis exacerbations.  His wife was found to be COVID positive on January 3rd, but he was negative at that time.  The patient continued to feel ill and tested positive for COVID on January 7th.  His son-in-law gave him a home pulse ox device to measure his oxygen saturations and he was found to be measuring 87-90% improved on room air.  He called his sister who is a nurse who advised that he present to the ED given his positive COVID status, lung history, and low O2 saturations.  He states that earlier he presented to a critical access hospital where he was found to be hypoxic to 84%.  He is not on home oxygen.  Patient has a COVID risk score of 3.    In the ED patient was found to be hypertensive to 214/101, and 94% on room air.  Significant labs include K 3.4, chloride 91, bicarb 32, glucose 208 should, , . Chest x-ray showed bilateral ground-glass opacities and multifocal infectious versus noninfectious inflammatory process.  He received dexamethasone, hydralazine, and was started on remdesivir.      Overview/Hospital Course:  Patient admitted for acute respiratory failure secondary to  COVID viral pneumonia. Patient started on dexamethasone and remdesivir for COVID treatment. He was started on losartan / HCTZ for HTN and later added amlodipine 5mg daily. He also received hydralazine for elevated Bps on admission. He was found to have a A1c 9.8 and underwent nutrition counseling for dietitians.       Interval History: Patient seen and examined today; OOBTC. Less distress with speech. Remains on 2LNC. A1C indicative of diabetes, patient made aware and notified he may need insulin on discharge.     Review of Systems   Constitutional: Positive for chills and fever.   HENT: Negative for congestion, hearing loss, postnasal drip, sinus pressure and sore throat.    Eyes: Negative for pain and visual disturbance.   Respiratory: Positive for cough, shortness of breath and wheezing.    Cardiovascular: Negative for chest pain, palpitations and leg swelling.   Gastrointestinal: Positive for diarrhea. Negative for abdominal pain, constipation, nausea and vomiting.   Genitourinary: Negative for difficulty urinating, dysuria, frequency and urgency.   Skin: Negative for rash.   Neurological: Positive for headaches. Negative for dizziness, seizures, syncope and numbness.   Psychiatric/Behavioral: Negative for confusion and hallucinations.     Objective:     Vital Signs (Most Recent):  Temp: 98.1 °F (36.7 °C) (01/13/22 1203)  Pulse: 86 (01/13/22 1326)  Resp: 18 (01/13/22 1326)  BP: (!) 157/86 (01/13/22 1203)  SpO2: 96 % (01/13/22 1326) Vital Signs (24h Range):  Temp:  [97.7 °F (36.5 °C)-98.7 °F (37.1 °C)] 98.1 °F (36.7 °C)  Pulse:  [68-96] 86  Resp:  [18-23] 18  SpO2:  [89 %-97 %] 96 %  BP: (127-195)/(60-95) 157/86     Weight: 127.9 kg (282 lb)  Body mass index is 40.46 kg/m².  No intake or output data in the 24 hours ending 01/13/22 1417   Physical Exam  Constitutional:       Appearance: He is obese.   HENT:      Head: Normocephalic and atraumatic.      Mouth/Throat:      Mouth: Mucous membranes are moist.   Eyes:       General: No scleral icterus.     Extraocular Movements: Extraocular movements intact.      Pupils: Pupils are equal, round, and reactive to light.   Cardiovascular:      Rate and Rhythm: Normal rate and regular rhythm.      Heart sounds: No murmur heard.      Pulmonary:      Effort: Respiratory distress present.      Breath sounds: Wheezing present. No rhonchi or rales.   Abdominal:      General: Bowel sounds are normal. There is distension.      Palpations: Abdomen is soft.   Musculoskeletal:         General: No swelling.      Cervical back: No rigidity.      Right lower leg: No edema.      Left lower leg: No edema.   Lymphadenopathy:      Cervical: No cervical adenopathy.   Skin:     General: Skin is warm and dry.      Capillary Refill: Capillary refill takes less than 2 seconds.   Neurological:      General: No focal deficit present.      Mental Status: He is alert.   Psychiatric:         Mood and Affect: Mood normal.         Significant Labs:   All pertinent labs within the past 24 hours have been reviewed.  CBC:   Recent Labs   Lab 01/12/22 0048 01/13/22  0541   WBC 11.07 13.04*   HGB 14.3 14.0   HCT 46.6 46.2    376     CMP:   Recent Labs   Lab 01/12/22 0048 01/13/22  0541    139   K 3.4* 4.0   CL 91* 93*   CO2 32* 33*   * 187*   BUN 11 21*   CREATININE 0.7 0.7   CALCIUM 9.6 9.5   PROT 7.9 7.3   ALBUMIN 3.6 3.3*   BILITOT 0.4 0.3   ALKPHOS 109 88   AST 44* 34   ALT 79* 59*   ANIONGAP 15 13   EGFRNONAA >60.0 >60.0       Significant Imaging: I have reviewed all pertinent imaging results/findings within the past 24 hours.      Assessment/Plan:      * Pneumonia due to COVID-19 virus  Patient presents to the ED today with 2 weeks cough that he says are similar to his normal chronic bronchitis exacerbations.  Says his wife tested positive for COVID on January 3rd.  At that time he tested negative.  He tested positive for Covid on January 7th after continuing to feel short of breath and not  spiking a fever.  He was given a at home pulse ox by family member, and noted that it was reading from 87-90.  Contact the sister was a nurse who suggested that he present to the emergency department.  On arrival to the emergency department patient was found to be hypertensive to 214/101 and satting at 94% on room air.  Patient has a COVID risk score of 3.    -- Covid isolation   -- dexamethasone 6 mg daily for 10 days  -- remdesivir 200 mg load on day 1, followed by 100 mg daily for days 2 through 5  -- vitamin-C 500 mg b.i.d. daily  -- multivitamin daily  -- enoxaparin 120mg b.i.d.  -- supplemental oxygen p.r.n. with oxygen goal >92%    Type 2 diabetes mellitus  A1c 9.8 on admit  Patient made aware  Plan for detemir and SSI  Diabetic diet and diabetic nutrition counseling      Acute hypoxemic respiratory failure  Patient with Hypoxic Respiratory failure which is Acute.  he is not on home oxygen. Supplemental oxygen was provided and noted-  .   Signs/symptoms of respiratory failure include- respiratory distress. Contributing diagnoses includes - obesity Labs and images were reviewed. Patient Has not had a recent ABG. Will treat underlying causes and adjust management of respiratory failure as follows- COVID    See COVID    Severe obesity (BMI >= 40)        Hypokalemia  Patient presented to the ED with K of 3.4.    -- replace p.r.n.  -- trend CMP      Essential hypertension  -- Continue home Losartan-HCTZ 100-25mg daily  -- Added amlodipine 5mg po daily    Chronic bronchitis  Patient states he has long standing history of Chronic bronchitis. He does not remember when it was first diagnosed. Takes albuterol breathing treatment PRN. States that he began experiencing his usual bronchitis cough and wheeze 2 weeks prior to presentation to ED and that his current symptoms are similar to his normal exacerbation symptoms. He does not use O2 at home.     -- On 2L NC in ED O2 sats of 96%.  -- Duo-nebs q6 PRN while wake  --  Wean O2 as tolerated. Goal O2 88-92%      VTE Risk Mitigation (From admission, onward)         Ordered     enoxaparin injection 120 mg  2 times daily         01/12/22 0446                Discharge Planning   ARSALAN: 1/16/2022     Code Status: Full Code   Is the patient medically ready for discharge?: No    Reason for patient still in hospital (select all that apply): Patient trending condition             May Perez MD  Department of Hospital Medicine   [facility] - Intensive Care ([place])

## 2022-01-13 NOTE — SUBJECTIVE & OBJECTIVE
Interval History: Patient seen and examined today; OOBTC. Less distress with speech. Remains on 2LNC. A1C indicative of diabetes, patient made aware and notified he may need insulin on discharge.     Review of Systems   Constitutional: Positive for chills and fever.   HENT: Negative for congestion, hearing loss, postnasal drip, sinus pressure and sore throat.    Eyes: Negative for pain and visual disturbance.   Respiratory: Positive for cough, shortness of breath and wheezing.    Cardiovascular: Negative for chest pain, palpitations and leg swelling.   Gastrointestinal: Positive for diarrhea. Negative for abdominal pain, constipation, nausea and vomiting.   Genitourinary: Negative for difficulty urinating, dysuria, frequency and urgency.   Skin: Negative for rash.   Neurological: Positive for headaches. Negative for dizziness, seizures, syncope and numbness.   Psychiatric/Behavioral: Negative for confusion and hallucinations.     Objective:     Vital Signs (Most Recent):  Temp: 98.1 °F (36.7 °C) (01/13/22 1203)  Pulse: 86 (01/13/22 1326)  Resp: 18 (01/13/22 1326)  BP: (!) 157/86 (01/13/22 1203)  SpO2: 96 % (01/13/22 1326) Vital Signs (24h Range):  Temp:  [97.7 °F (36.5 °C)-98.7 °F (37.1 °C)] 98.1 °F (36.7 °C)  Pulse:  [68-96] 86  Resp:  [18-23] 18  SpO2:  [89 %-97 %] 96 %  BP: (127-195)/(60-95) 157/86     Weight: 127.9 kg (282 lb)  Body mass index is 40.46 kg/m².  No intake or output data in the 24 hours ending 01/13/22 1417   Physical Exam  Constitutional:       Appearance: He is obese.   HENT:      Head: Normocephalic and atraumatic.      Mouth/Throat:      Mouth: Mucous membranes are moist.   Eyes:      General: No scleral icterus.     Extraocular Movements: Extraocular movements intact.      Pupils: Pupils are equal, round, and reactive to light.   Cardiovascular:      Rate and Rhythm: Normal rate and regular rhythm.      Heart sounds: No murmur heard.      Pulmonary:      Effort: Respiratory distress present.       Breath sounds: Wheezing present. No rhonchi or rales.   Abdominal:      General: Bowel sounds are normal. There is distension.      Palpations: Abdomen is soft.   Musculoskeletal:         General: No swelling.      Cervical back: No rigidity.      Right lower leg: No edema.      Left lower leg: No edema.   Lymphadenopathy:      Cervical: No cervical adenopathy.   Skin:     General: Skin is warm and dry.      Capillary Refill: Capillary refill takes less than 2 seconds.   Neurological:      General: No focal deficit present.      Mental Status: He is alert.   Psychiatric:         Mood and Affect: Mood normal.         Significant Labs:   All pertinent labs within the past 24 hours have been reviewed.  CBC:   Recent Labs   Lab 01/12/22 0048 01/13/22  0541   WBC 11.07 13.04*   HGB 14.3 14.0   HCT 46.6 46.2    376     CMP:   Recent Labs   Lab 01/12/22 0048 01/13/22  0541    139   K 3.4* 4.0   CL 91* 93*   CO2 32* 33*   * 187*   BUN 11 21*   CREATININE 0.7 0.7   CALCIUM 9.6 9.5   PROT 7.9 7.3   ALBUMIN 3.6 3.3*   BILITOT 0.4 0.3   ALKPHOS 109 88   AST 44* 34   ALT 79* 59*   ANIONGAP 15 13   EGFRNONAA >60.0 >60.0       Significant Imaging: I have reviewed all pertinent imaging results/findings within the past 24 hours.

## 2022-01-13 NOTE — ASSESSMENT & PLAN NOTE
Patient presents to the ED today with 2 weeks cough that he says are similar to his normal chronic bronchitis exacerbations.  Says his wife tested positive for COVID on January 3rd.  At that time he tested negative.  He tested positive for Covid on January 7th after continuing to feel short of breath and not spiking a fever.  He was given a at home pulse ox by family member, and noted that it was reading from 87-90.  Contact the sister was a nurse who suggested that he present to the emergency department.  On arrival to the emergency department patient was found to be hypertensive to 214/101 and satting at 94% on room air.  Patient has a COVID risk score of 3.    -- Covid isolation   -- dexamethasone 6 mg daily for 10 days  -- remdesivir 200 mg load on day 1, followed by 100 mg daily for days 2 through 5  -- vitamin-C 500 mg b.i.d. daily  -- multivitamin daily  -- enoxaparin 120mg b.i.d.  -- supplemental oxygen p.r.n. with oxygen goal >92%

## 2022-01-13 NOTE — ASSESSMENT & PLAN NOTE
Patient with Hypoxic Respiratory failure which is Acute.  he is not on home oxygen. Supplemental oxygen was provided and noted-  .   Signs/symptoms of respiratory failure include- respiratory distress. Contributing diagnoses includes - obesity Labs and images were reviewed. Patient Has not had a recent ABG. Will treat underlying causes and adjust management of respiratory failure as follows- COVID    See COVID

## 2022-01-13 NOTE — PLAN OF CARE
Hospital follow up appointment scheduled for the patient with Dr. Donaldo Willis (new PCP) on 1/20/2022 at 1400.      Will continue to follow.

## 2022-01-13 NOTE — ASSESSMENT & PLAN NOTE
A1c 9.8 on admit  Patient made aware  Plan for detemir and SSI  Diabetic diet and diabetic nutrition counseling

## 2022-01-13 NOTE — ED NOTES
Tele box [id number] applied to pt. War room states able to see pt on monitor, rhythm 110 with HR sinus tachycardia.

## 2022-01-14 LAB
ALBUMIN SERPL BCP-MCNC: 3.3 G/DL (ref 3.5–5.2)
ALP SERPL-CCNC: 91 U/L (ref 55–135)
ALT SERPL W/O P-5'-P-CCNC: 57 U/L (ref 10–44)
ANION GAP SERPL CALC-SCNC: 9 MMOL/L (ref 8–16)
AST SERPL-CCNC: 39 U/L (ref 10–40)
BASOPHILS # BLD AUTO: 0.04 K/UL (ref 0–0.2)
BASOPHILS NFR BLD: 0.4 % (ref 0–1.9)
BILIRUB SERPL-MCNC: 0.4 MG/DL (ref 0.1–1)
BUN SERPL-MCNC: 21 MG/DL (ref 6–20)
CALCIUM SERPL-MCNC: 9.3 MG/DL (ref 8.7–10.5)
CHLORIDE SERPL-SCNC: 91 MMOL/L (ref 95–110)
CO2 SERPL-SCNC: 37 MMOL/L (ref 23–29)
CREAT SERPL-MCNC: 0.7 MG/DL (ref 0.5–1.4)
D DIMER PPP IA.FEU-MCNC: <0.19 MG/L FEU
DIFFERENTIAL METHOD: ABNORMAL
EOSINOPHIL # BLD AUTO: 0 K/UL (ref 0–0.5)
EOSINOPHIL NFR BLD: 0.1 % (ref 0–8)
ERYTHROCYTE [DISTWIDTH] IN BLOOD BY AUTOMATED COUNT: 13.4 % (ref 11.5–14.5)
EST. GFR  (AFRICAN AMERICAN): >60 ML/MIN/1.73 M^2
EST. GFR  (NON AFRICAN AMERICAN): >60 ML/MIN/1.73 M^2
GLUCOSE SERPL-MCNC: 158 MG/DL (ref 70–110)
HCT VFR BLD AUTO: 44.6 % (ref 40–54)
HGB BLD-MCNC: 13.6 G/DL (ref 14–18)
IMM GRANULOCYTES # BLD AUTO: 0.06 K/UL (ref 0–0.04)
IMM GRANULOCYTES NFR BLD AUTO: 0.6 % (ref 0–0.5)
LYMPHOCYTES # BLD AUTO: 2.3 K/UL (ref 1–4.8)
LYMPHOCYTES NFR BLD: 21 % (ref 18–48)
MAGNESIUM SERPL-MCNC: 2.2 MG/DL (ref 1.6–2.6)
MCH RBC QN AUTO: 28.7 PG (ref 27–31)
MCHC RBC AUTO-ENTMCNC: 30.5 G/DL (ref 32–36)
MCV RBC AUTO: 94 FL (ref 82–98)
MONOCYTES # BLD AUTO: 0.5 K/UL (ref 0.3–1)
MONOCYTES NFR BLD: 5 % (ref 4–15)
NEUTROPHILS # BLD AUTO: 7.8 K/UL (ref 1.8–7.7)
NEUTROPHILS NFR BLD: 72.9 % (ref 38–73)
NRBC BLD-RTO: 0 /100 WBC
PHOSPHATE SERPL-MCNC: 4.6 MG/DL (ref 2.7–4.5)
PLATELET # BLD AUTO: 324 K/UL (ref 150–450)
PMV BLD AUTO: 9.5 FL (ref 9.2–12.9)
POCT GLUCOSE: 170 MG/DL (ref 70–110)
POCT GLUCOSE: 182 MG/DL (ref 70–110)
POCT GLUCOSE: 225 MG/DL (ref 70–110)
POCT GLUCOSE: 401 MG/DL (ref 70–110)
POTASSIUM SERPL-SCNC: 3.7 MMOL/L (ref 3.5–5.1)
PROT SERPL-MCNC: 7.1 G/DL (ref 6–8.4)
RBC # BLD AUTO: 4.74 M/UL (ref 4.6–6.2)
SODIUM SERPL-SCNC: 137 MMOL/L (ref 136–145)
WBC # BLD AUTO: 10.74 K/UL (ref 3.9–12.7)

## 2022-01-14 PROCEDURE — 94640 AIRWAY INHALATION TREATMENT: CPT

## 2022-01-14 PROCEDURE — 27000221 HC OXYGEN, UP TO 24 HOURS

## 2022-01-14 PROCEDURE — 25000003 PHARM REV CODE 250: Performed by: STUDENT IN AN ORGANIZED HEALTH CARE EDUCATION/TRAINING PROGRAM

## 2022-01-14 PROCEDURE — 12000002 HC ACUTE/MED SURGE SEMI-PRIVATE ROOM

## 2022-01-14 PROCEDURE — 63600175 PHARM REV CODE 636 W HCPCS: Performed by: EMERGENCY MEDICINE

## 2022-01-14 PROCEDURE — 99232 SBSQ HOSP IP/OBS MODERATE 35: CPT | Mod: ,,, | Performed by: STUDENT IN AN ORGANIZED HEALTH CARE EDUCATION/TRAINING PROGRAM

## 2022-01-14 PROCEDURE — 84100 ASSAY OF PHOSPHORUS: CPT

## 2022-01-14 PROCEDURE — 25000003 PHARM REV CODE 250

## 2022-01-14 PROCEDURE — 85025 COMPLETE CBC W/AUTO DIFF WBC: CPT

## 2022-01-14 PROCEDURE — 99232 PR SUBSEQUENT HOSPITAL CARE,LEVL II: ICD-10-PCS | Mod: ,,, | Performed by: STUDENT IN AN ORGANIZED HEALTH CARE EDUCATION/TRAINING PROGRAM

## 2022-01-14 PROCEDURE — 99900035 HC TECH TIME PER 15 MIN (STAT)

## 2022-01-14 PROCEDURE — 83735 ASSAY OF MAGNESIUM: CPT

## 2022-01-14 PROCEDURE — 63600175 PHARM REV CODE 636 W HCPCS

## 2022-01-14 PROCEDURE — 94761 N-INVAS EAR/PLS OXIMETRY MLT: CPT

## 2022-01-14 PROCEDURE — 36415 COLL VENOUS BLD VENIPUNCTURE: CPT

## 2022-01-14 PROCEDURE — 80053 COMPREHEN METABOLIC PANEL: CPT

## 2022-01-14 PROCEDURE — 27000207 HC ISOLATION

## 2022-01-14 PROCEDURE — 25000003 PHARM REV CODE 250: Performed by: EMERGENCY MEDICINE

## 2022-01-14 PROCEDURE — 85379 FIBRIN DEGRADATION QUANT: CPT

## 2022-01-14 RX ORDER — ACETAMINOPHEN 325 MG/1
650 TABLET ORAL EVERY 6 HOURS PRN
Status: DISCONTINUED | OUTPATIENT
Start: 2022-01-14 | End: 2022-01-16 | Stop reason: HOSPADM

## 2022-01-14 RX ORDER — ALBUTEROL SULFATE 90 UG/1
2 AEROSOL, METERED RESPIRATORY (INHALATION) EVERY 6 HOURS PRN
Status: DISCONTINUED | OUTPATIENT
Start: 2022-01-14 | End: 2022-01-15

## 2022-01-14 RX ADMIN — Medication 500 MG: at 10:01

## 2022-01-14 RX ADMIN — MULTIPLE VITAMINS W/ MINERALS TAB 1 TABLET: TAB at 10:01

## 2022-01-14 RX ADMIN — Medication 500 MG: at 08:01

## 2022-01-14 RX ADMIN — ENOXAPARIN SODIUM 120 MG: 120 INJECTION SUBCUTANEOUS at 08:01

## 2022-01-14 RX ADMIN — AMLODIPINE BESYLATE 5 MG: 5 TABLET ORAL at 10:01

## 2022-01-14 RX ADMIN — ACETAMINOPHEN 650 MG: 325 TABLET ORAL at 05:01

## 2022-01-14 RX ADMIN — INSULIN ASPART 5 UNITS: 100 INJECTION, SOLUTION INTRAVENOUS; SUBCUTANEOUS at 05:01

## 2022-01-14 RX ADMIN — LOSARTAN POTASSIUM AND HYDROCHLOROTHIAZIDE 1 TABLET: 25; 100 TABLET ORAL at 10:01

## 2022-01-14 RX ADMIN — ENOXAPARIN SODIUM 120 MG: 120 INJECTION SUBCUTANEOUS at 10:01

## 2022-01-14 RX ADMIN — INSULIN ASPART 1 UNITS: 100 INJECTION, SOLUTION INTRAVENOUS; SUBCUTANEOUS at 08:01

## 2022-01-14 RX ADMIN — MELATONIN TAB 3 MG 6 MG: 3 TAB at 08:01

## 2022-01-14 RX ADMIN — REMDESIVIR 100 MG: 100 INJECTION, POWDER, LYOPHILIZED, FOR SOLUTION INTRAVENOUS at 10:01

## 2022-01-14 RX ADMIN — ALBUTEROL SULFATE 2 PUFF: 108 INHALANT RESPIRATORY (INHALATION) at 01:01

## 2022-01-14 RX ADMIN — DEXAMETHASONE 6 MG: 4 TABLET ORAL at 10:01

## 2022-01-14 NOTE — PROGRESS NOTES
[facility] ([address] Medicine  Progress Note    Patient Name: Ned Brock  MRN: [id number]  Patient Class: IP- Inpatient   Admission Date: 1/12/2022  Length of Stay: 2 days  Attending Physician: Reed Vela MD  Primary Care Provider: Donaldo Willis MD        Subjective:     Principal Problem:Pneumonia due to COVID-19 virus        HPI:  Ned Brock is a 61-year-old male with past medical history of chronic bronchitis, hypertension, BPH with obstruction, and cholecystectomy who presents to the ED with 2 weeks of cough and wheeze productive of yellow sputum, headache, and positive COVID test on January 7th with oxygen saturations of 87-90% measured the home pulse ox.  Patient states that 2 weeks ago he developed a cough with wheeze that is typical for his normal chronic bronchitis exacerbations.  His wife was found to be COVID positive on January 3rd, but he was negative at that time.  The patient continued to feel ill and tested positive for COVID on January 7th.  His son-in-law gave him a home pulse ox device to measure his oxygen saturations and he was found to be measuring 87-90% improved on room air.  He called his sister who is a nurse who advised that he present to the ED given his positive COVID status, lung history, and low O2 saturations.  He states that earlier he presented to a critical access hospital where he was found to be hypoxic to 84%.  He is not on home oxygen.  Patient has a COVID risk score of 3.    In the ED patient was found to be hypertensive to 214/101, and 94% on room air.  Significant labs include K 3.4, chloride 91, bicarb 32, glucose 208 should, , . Chest x-ray showed bilateral ground-glass opacities and multifocal infectious versus noninfectious inflammatory process.  He received dexamethasone, hydralazine, and was started on remdesivir.      Overview/Hospital Course:  Patient admitted for acute respiratory failure secondary to  COVID viral pneumonia. Patient started on dexamethasone and remdesivir for COVID treatment. He was started on losartan / HCTZ for HTN and later added amlodipine 5mg daily. He also received hydralazine for elevated Bps on admission. He was found to have a A1c 9.8 and underwent nutrition counseling for diabetes.       Interval History: NAEON. Afebrile, HD stable. Still on 2L NC with sats 92-97%.     Review of Systems   Constitutional: Positive for chills and fever.   HENT: Negative for congestion, hearing loss, postnasal drip, sinus pressure and sore throat.    Eyes: Negative for pain and visual disturbance.   Respiratory: Positive for cough, shortness of breath and wheezing.    Cardiovascular: Negative for chest pain, palpitations and leg swelling.   Gastrointestinal: Negative for abdominal pain, constipation, diarrhea, nausea and vomiting.   Genitourinary: Negative for difficulty urinating, dysuria, frequency and urgency.   Skin: Negative for rash.   Neurological: Positive for headaches. Negative for dizziness, seizures, syncope and numbness.   Psychiatric/Behavioral: Negative for confusion and hallucinations.     Objective:     Vital Signs (Most Recent):  Temp: 98 °F (36.7 °C) (01/14/22 0350)  Pulse: 69 (01/14/22 0715)  Resp: 18 (01/14/22 0110)  BP: 134/77 (01/14/22 0350)  SpO2: (!) 94 % (01/14/22 0350) Vital Signs (24h Range):  Temp:  [97.7 °F (36.5 °C)-99 °F (37.2 °C)] 98 °F (36.7 °C)  Pulse:  [60-90] 69  Resp:  [18-20] 18  SpO2:  [92 %-96 %] 94 %  BP: (134-195)/(75-95) 134/77     Weight: 127.9 kg (282 lb)  Body mass index is 40.46 kg/m².  No intake or output data in the 24 hours ending 01/14/22 0746   Physical Exam  Constitutional:       Appearance: He is obese.   HENT:      Head: Normocephalic and atraumatic.      Mouth/Throat:      Mouth: Mucous membranes are moist.   Eyes:      General: No scleral icterus.     Extraocular Movements: Extraocular movements intact.      Pupils: Pupils are equal, round, and  reactive to light.   Cardiovascular:      Rate and Rhythm: Normal rate and regular rhythm.      Heart sounds: No murmur heard.      Pulmonary:      Effort: Respiratory distress present.      Breath sounds: Wheezing present. No rhonchi or rales.   Abdominal:      General: Bowel sounds are normal.      Palpations: Abdomen is soft.   Musculoskeletal:         General: No swelling.      Cervical back: No rigidity.      Right lower leg: No edema.      Left lower leg: No edema.   Lymphadenopathy:      Cervical: No cervical adenopathy.   Skin:     General: Skin is warm and dry.      Capillary Refill: Capillary refill takes less than 2 seconds.   Neurological:      General: No focal deficit present.      Mental Status: He is alert.   Psychiatric:         Mood and Affect: Mood normal.         Significant Labs: All pertinent labs within the past 24 hours have been reviewed.    Significant Imaging: I have reviewed all pertinent imaging results/findings within the past 24 hours.      Assessment/Plan:      * Pneumonia due to COVID-19 virus  Patient presents to the ED today with 2 weeks cough that he says are similar to his normal chronic bronchitis exacerbations.  Says his wife tested positive for COVID on January 3rd.  At that time he tested negative.  He tested positive for Covid on January 7th after continuing to feel short of breath and not spiking a fever.  He was given a at home pulse ox by family member, and noted that it was reading from 87-90.  Contact the sister was a nurse who suggested that he present to the emergency department.  On arrival to the emergency department patient was found to be hypertensive to 214/101 and satting at 94% on room air.  Patient has a COVID risk score of 3.    -- Covid isolation   -- dexamethasone 6 mg daily for 10 days  -- remdesivir 200 mg load on day 1, followed by 100 mg daily for days 2 through 5  -- vitamin-C 500 mg b.i.d. daily  -- multivitamin daily  -- enoxaparin 120mg b.i.d.  --  supplemental oxygen p.r.n. with oxygen goal >92%    Type 2 diabetes mellitus  A1c 9.8 on admit  Patient made aware  Plan for detemir and SSI  Diabetic diet and diabetic nutrition counseling      Acute hypoxemic respiratory failure  Patient with Hypoxic Respiratory failure which is Acute.  he is not on home oxygen. Supplemental oxygen was provided and noted- Oxygen Concentration (%):  [28] 28.   Signs/symptoms of respiratory failure include- respiratory distress. Contributing diagnoses includes - obesity Labs and images were reviewed. Patient Has not had a recent ABG. Will treat underlying causes and adjust management of respiratory failure as follows- COVID    See COVID    Severe obesity (BMI >= 40)  Nutrition consulted      Hypokalemia  Patient presented to the ED with K of 3.4.    -- replace p.r.n.  -- trend CMP       Essential hypertension  -- Continue home Losartan-HCTZ 100-25mg daily  -- Cont amlodipine 5mg po daily    Chronic bronchitis  Patient states he has long standing history of Chronic bronchitis. He does not remember when it was first diagnosed. Takes albuterol breathing treatment PRN. States that he began experiencing his usual bronchitis cough and wheeze 2 weeks prior to presentation to ED and that his current symptoms are similar to his normal exacerbation symptoms. He does not use O2 at home.     -- On 2L NC in ED O2 sats of 96%.  -- Duo-nebs q6 PRN while wake  -- Wean O2 as tolerated. Goal O2 88-92%      VTE Risk Mitigation (From admission, onward)         Ordered     enoxaparin injection 120 mg  2 times daily         01/12/22 0446                Discharge Planning   ARSALAN: 1/16/2022     Code Status: Full Code   Is the patient medically ready for discharge?: No    Reason for patient still in hospital (select all that apply): Patient trending condition and Treatment                     Jamilah Canales (Anderson Name: MD Marek  Department of Hospital Medicine   [facility] - Intensive Care [facility]  [id number])

## 2022-01-14 NOTE — PROGRESS NOTES
Nutrition-Related Diabetes Education      Time Spent: 30 minutes    Learners:Patient    Current HbA1c:9.8    Is patient aware of their A1c___Yes and their goal A1c?       ___X____ yes    Home diabetes medication(s):none , newly diagnosed does have family hx of diabetes and sister who has been telling him to watch his diet and his weight more closely.   BMI 40.46 127.9 kg    Nutrition Education with handouts: Carbohydrate counting 2000 calorie diet, 4 carbohydrate servings per meal and 3 carbohydrate servings for HS snack. Sample meal plan provided.    Comments:patient loves food, he cooks, has a lot of food in freezer, his portions are too large, he likes sweets a lot. He gained 100 pounds in one year, caring for a dying uncle and was diagnosed with HTN during that time period.           Barriers to Learning: None. Patient will have a visit soon with new PCP and he was encourage to ask for referral to out-patient dietitian and/or diabetes educator.     Follow up: weekly

## 2022-01-14 NOTE — ASSESSMENT & PLAN NOTE
Patient with Hypoxic Respiratory failure which is Acute.  he is not on home oxygen. Supplemental oxygen was provided and noted- Oxygen Concentration (%):  [28] 28.   Signs/symptoms of respiratory failure include- respiratory distress. Contributing diagnoses includes - obesity Labs and images were reviewed. Patient Has not had a recent ABG. Will treat underlying causes and adjust management of respiratory failure as follows- COVID    See COVID

## 2022-01-14 NOTE — SUBJECTIVE & OBJECTIVE
Interval History: NAEON. Afebrile, HD stable. Still on 2L NC with sats 92-97%.     Review of Systems   Constitutional: Positive for chills and fever.   HENT: Negative for congestion, hearing loss, postnasal drip, sinus pressure and sore throat.    Eyes: Negative for pain and visual disturbance.   Respiratory: Positive for cough, shortness of breath and wheezing.    Cardiovascular: Negative for chest pain, palpitations and leg swelling.   Gastrointestinal: Negative for abdominal pain, constipation, diarrhea, nausea and vomiting.   Genitourinary: Negative for difficulty urinating, dysuria, frequency and urgency.   Skin: Negative for rash.   Neurological: Positive for headaches. Negative for dizziness, seizures, syncope and numbness.   Psychiatric/Behavioral: Negative for confusion and hallucinations.     Objective:     Vital Signs (Most Recent):  Temp: 98 °F (36.7 °C) (01/14/22 0350)  Pulse: 69 (01/14/22 0715)  Resp: 18 (01/14/22 0110)  BP: 134/77 (01/14/22 0350)  SpO2: (!) 94 % (01/14/22 0350) Vital Signs (24h Range):  Temp:  [97.7 °F (36.5 °C)-99 °F (37.2 °C)] 98 °F (36.7 °C)  Pulse:  [60-90] 69  Resp:  [18-20] 18  SpO2:  [92 %-96 %] 94 %  BP: (134-195)/(75-95) 134/77     Weight: 127.9 kg (282 lb)  Body mass index is 40.46 kg/m².  No intake or output data in the 24 hours ending 01/14/22 0746   Physical Exam  Constitutional:       Appearance: He is obese.   HENT:      Head: Normocephalic and atraumatic.      Mouth/Throat:      Mouth: Mucous membranes are moist.   Eyes:      General: No scleral icterus.     Extraocular Movements: Extraocular movements intact.      Pupils: Pupils are equal, round, and reactive to light.   Cardiovascular:      Rate and Rhythm: Normal rate and regular rhythm.      Heart sounds: No murmur heard.      Pulmonary:      Effort: Respiratory distress present.      Breath sounds: Wheezing present. No rhonchi or rales.   Abdominal:      General: Bowel sounds are normal.      Palpations: Abdomen  is soft.   Musculoskeletal:         General: No swelling.      Cervical back: No rigidity.      Right lower leg: No edema.      Left lower leg: No edema.   Lymphadenopathy:      Cervical: No cervical adenopathy.   Skin:     General: Skin is warm and dry.      Capillary Refill: Capillary refill takes less than 2 seconds.   Neurological:      General: No focal deficit present.      Mental Status: He is alert.   Psychiatric:         Mood and Affect: Mood normal.         Significant Labs: All pertinent labs within the past 24 hours have been reviewed.    Significant Imaging: I have reviewed all pertinent imaging results/findings within the past 24 hours.

## 2022-01-15 PROBLEM — E87.1 HYPONATREMIA: Status: ACTIVE | Noted: 2022-01-15

## 2022-01-15 LAB
ALBUMIN SERPL BCP-MCNC: 3.4 G/DL (ref 3.5–5.2)
ALP SERPL-CCNC: 78 U/L (ref 55–135)
ALT SERPL W/O P-5'-P-CCNC: 71 U/L (ref 10–44)
ANION GAP SERPL CALC-SCNC: 7 MMOL/L (ref 8–16)
AST SERPL-CCNC: 70 U/L (ref 10–40)
BASOPHILS # BLD AUTO: 0.03 K/UL (ref 0–0.2)
BASOPHILS NFR BLD: 0.3 % (ref 0–1.9)
BILIRUB SERPL-MCNC: 0.4 MG/DL (ref 0.1–1)
BUN SERPL-MCNC: 18 MG/DL (ref 6–20)
CALCIUM SERPL-MCNC: 9.2 MG/DL (ref 8.7–10.5)
CHLORIDE SERPL-SCNC: 88 MMOL/L (ref 95–110)
CO2 SERPL-SCNC: 35 MMOL/L (ref 23–29)
CREAT SERPL-MCNC: 0.7 MG/DL (ref 0.5–1.4)
CREAT UR-MCNC: 31 MG/DL (ref 23–375)
DIFFERENTIAL METHOD: ABNORMAL
EOSINOPHIL # BLD AUTO: 0 K/UL (ref 0–0.5)
EOSINOPHIL NFR BLD: 0.2 % (ref 0–8)
ERYTHROCYTE [DISTWIDTH] IN BLOOD BY AUTOMATED COUNT: 13.2 % (ref 11.5–14.5)
EST. GFR  (AFRICAN AMERICAN): >60 ML/MIN/1.73 M^2
EST. GFR  (NON AFRICAN AMERICAN): >60 ML/MIN/1.73 M^2
GLUCOSE SERPL-MCNC: 156 MG/DL (ref 70–110)
HCT VFR BLD AUTO: 43.8 % (ref 40–54)
HGB BLD-MCNC: 13.9 G/DL (ref 14–18)
IMM GRANULOCYTES # BLD AUTO: 0.04 K/UL (ref 0–0.04)
IMM GRANULOCYTES NFR BLD AUTO: 0.4 % (ref 0–0.5)
LYMPHOCYTES # BLD AUTO: 2.3 K/UL (ref 1–4.8)
LYMPHOCYTES NFR BLD: 24.4 % (ref 18–48)
MCH RBC QN AUTO: 28.6 PG (ref 27–31)
MCHC RBC AUTO-ENTMCNC: 31.7 G/DL (ref 32–36)
MCV RBC AUTO: 90 FL (ref 82–98)
MONOCYTES # BLD AUTO: 0.6 K/UL (ref 0.3–1)
MONOCYTES NFR BLD: 6.2 % (ref 4–15)
NEUTROPHILS # BLD AUTO: 6.5 K/UL (ref 1.8–7.7)
NEUTROPHILS NFR BLD: 68.5 % (ref 38–73)
NRBC BLD-RTO: 0 /100 WBC
OSMOLALITY SERPL: 297 MOSM/KG (ref 280–300)
OSMOLALITY UR: 372 MOSM/KG (ref 50–1200)
PLATELET # BLD AUTO: 331 K/UL (ref 150–450)
PMV BLD AUTO: 9.3 FL (ref 9.2–12.9)
POCT GLUCOSE: 315 MG/DL (ref 70–110)
POTASSIUM SERPL-SCNC: 3.5 MMOL/L (ref 3.5–5.1)
PROT SERPL-MCNC: 7 G/DL (ref 6–8.4)
RBC # BLD AUTO: 4.86 M/UL (ref 4.6–6.2)
SODIUM SERPL-SCNC: 130 MMOL/L (ref 136–145)
SODIUM UR-SCNC: 86 MMOL/L (ref 20–250)
WBC # BLD AUTO: 9.55 K/UL (ref 3.9–12.7)

## 2022-01-15 PROCEDURE — 99900035 HC TECH TIME PER 15 MIN (STAT)

## 2022-01-15 PROCEDURE — 12000002 HC ACUTE/MED SURGE SEMI-PRIVATE ROOM

## 2022-01-15 PROCEDURE — 25000003 PHARM REV CODE 250: Performed by: STUDENT IN AN ORGANIZED HEALTH CARE EDUCATION/TRAINING PROGRAM

## 2022-01-15 PROCEDURE — 25000003 PHARM REV CODE 250

## 2022-01-15 PROCEDURE — 36415 COLL VENOUS BLD VENIPUNCTURE: CPT

## 2022-01-15 PROCEDURE — 27000221 HC OXYGEN, UP TO 24 HOURS

## 2022-01-15 PROCEDURE — 83930 ASSAY OF BLOOD OSMOLALITY: CPT

## 2022-01-15 PROCEDURE — 85025 COMPLETE CBC W/AUTO DIFF WBC: CPT | Performed by: STUDENT IN AN ORGANIZED HEALTH CARE EDUCATION/TRAINING PROGRAM

## 2022-01-15 PROCEDURE — 99233 SBSQ HOSP IP/OBS HIGH 50: CPT | Mod: ,,, | Performed by: STUDENT IN AN ORGANIZED HEALTH CARE EDUCATION/TRAINING PROGRAM

## 2022-01-15 PROCEDURE — 25000242 PHARM REV CODE 250 ALT 637 W/ HCPCS

## 2022-01-15 PROCEDURE — 83935 ASSAY OF URINE OSMOLALITY: CPT

## 2022-01-15 PROCEDURE — 84300 ASSAY OF URINE SODIUM: CPT

## 2022-01-15 PROCEDURE — 82570 ASSAY OF URINE CREATININE: CPT

## 2022-01-15 PROCEDURE — 27000207 HC ISOLATION

## 2022-01-15 PROCEDURE — 25000003 PHARM REV CODE 250: Performed by: EMERGENCY MEDICINE

## 2022-01-15 PROCEDURE — 80053 COMPREHEN METABOLIC PANEL: CPT

## 2022-01-15 PROCEDURE — 94640 AIRWAY INHALATION TREATMENT: CPT

## 2022-01-15 PROCEDURE — 63600175 PHARM REV CODE 636 W HCPCS: Performed by: EMERGENCY MEDICINE

## 2022-01-15 PROCEDURE — 63600175 PHARM REV CODE 636 W HCPCS

## 2022-01-15 PROCEDURE — 99233 PR SUBSEQUENT HOSPITAL CARE,LEVL III: ICD-10-PCS | Mod: ,,, | Performed by: STUDENT IN AN ORGANIZED HEALTH CARE EDUCATION/TRAINING PROGRAM

## 2022-01-15 PROCEDURE — 94761 N-INVAS EAR/PLS OXIMETRY MLT: CPT

## 2022-01-15 PROCEDURE — C9399 UNCLASSIFIED DRUGS OR BIOLOG: HCPCS

## 2022-01-15 RX ORDER — BENZONATATE 100 MG/1
100 CAPSULE ORAL 3 TIMES DAILY PRN
Start: 2022-01-15 | End: 2022-01-25

## 2022-01-15 RX ORDER — LISINOPRIL 5 MG/1
5 TABLET ORAL DAILY
Status: DISCONTINUED | OUTPATIENT
Start: 2022-01-15 | End: 2022-01-16 | Stop reason: HOSPADM

## 2022-01-15 RX ORDER — MUPIROCIN 20 MG/G
OINTMENT TOPICAL 2 TIMES DAILY
Status: DISCONTINUED | OUTPATIENT
Start: 2022-01-15 | End: 2022-01-16 | Stop reason: HOSPADM

## 2022-01-15 RX ORDER — LISINOPRIL 5 MG/1
5 TABLET ORAL DAILY
Qty: 90 TABLET | Refills: 3 | Status: SHIPPED | OUTPATIENT
Start: 2022-01-15 | End: 2022-05-02 | Stop reason: DRUGHIGH

## 2022-01-15 RX ORDER — ALBUTEROL SULFATE 90 UG/1
2 AEROSOL, METERED RESPIRATORY (INHALATION)
Status: DISCONTINUED | OUTPATIENT
Start: 2022-01-15 | End: 2022-01-16

## 2022-01-15 RX ADMIN — Medication 500 MG: at 08:01

## 2022-01-15 RX ADMIN — INSULIN ASPART 2 UNITS: 100 INJECTION, SOLUTION INTRAVENOUS; SUBCUTANEOUS at 08:01

## 2022-01-15 RX ADMIN — ALBUTEROL SULFATE 2 PUFF: 108 INHALANT RESPIRATORY (INHALATION) at 01:01

## 2022-01-15 RX ADMIN — INSULIN DETEMIR 5 UNITS: 100 INJECTION, SOLUTION SUBCUTANEOUS at 08:01

## 2022-01-15 RX ADMIN — GUAIFENESIN AND DEXTROMETHORPHAN 10 ML: 100; 10 SYRUP ORAL at 08:01

## 2022-01-15 RX ADMIN — MULTIPLE VITAMINS W/ MINERALS TAB 1 TABLET: TAB at 09:01

## 2022-01-15 RX ADMIN — AMLODIPINE BESYLATE 5 MG: 5 TABLET ORAL at 09:01

## 2022-01-15 RX ADMIN — LISINOPRIL 5 MG: 5 TABLET ORAL at 12:01

## 2022-01-15 RX ADMIN — ENOXAPARIN SODIUM 120 MG: 120 INJECTION SUBCUTANEOUS at 08:01

## 2022-01-15 RX ADMIN — DEXAMETHASONE 6 MG: 4 TABLET ORAL at 09:01

## 2022-01-15 RX ADMIN — MUPIROCIN: 20 OINTMENT TOPICAL at 08:01

## 2022-01-15 RX ADMIN — MUPIROCIN: 20 OINTMENT TOPICAL at 12:01

## 2022-01-15 RX ADMIN — ALBUTEROL SULFATE 2 PUFF: 108 INHALANT RESPIRATORY (INHALATION) at 07:01

## 2022-01-15 RX ADMIN — MELATONIN TAB 3 MG 6 MG: 3 TAB at 08:01

## 2022-01-15 RX ADMIN — REMDESIVIR 100 MG: 100 INJECTION, POWDER, LYOPHILIZED, FOR SOLUTION INTRAVENOUS at 09:01

## 2022-01-15 RX ADMIN — Medication 500 MG: at 09:01

## 2022-01-15 RX ADMIN — ENOXAPARIN SODIUM 120 MG: 120 INJECTION SUBCUTANEOUS at 09:01

## 2022-01-15 RX ADMIN — LOSARTAN POTASSIUM AND HYDROCHLOROTHIAZIDE 1 TABLET: 25; 100 TABLET ORAL at 09:01

## 2022-01-15 NOTE — ASSESSMENT & PLAN NOTE
Patient states he has long standing history of Chronic bronchitis. He does not remember when it was first diagnosed. Takes albuterol breathing treatment PRN. States that he began experiencing his usual bronchitis cough and wheeze 2 weeks prior to presentation to ED and that his current symptoms are similar to his normal exacerbation symptoms. He does not use O2 at home.     -- On 2L NC in ED O2 sats of 96%.  -- Duo-nebs q6 while wake  -- Wean O2 as tolerated. Goal O2 88-92%  -- CPT q6h  -- Six minute walk test

## 2022-01-15 NOTE — PROGRESS NOTES
[facility] - [facility] ([address] Medicine  Progress Note    Patient Name: Ned Brock  MRN: [id number]  Patient Class: IP- Inpatient   Admission Date: 1/12/2022  Length of Stay: 3 days  Attending Physician: Reed Vela MD  Primary Care Provider: Donaldo Willis MD        Subjective:     Principal Problem:Pneumonia due to COVID-19 virus        HPI:  Ned Brock is a 61-year-old male with past medical history of chronic bronchitis, hypertension, BPH with obstruction, and cholecystectomy who presents to the ED with 2 weeks of cough and wheeze productive of yellow sputum, headache, and positive COVID test on January 7th with oxygen saturations of 87-90% measured the home pulse ox.  Patient states that 2 weeks ago he developed a cough with wheeze that is typical for his normal chronic bronchitis exacerbations.  His wife was found to be COVID positive on January 3rd, but he was negative at that time.  The patient continued to feel ill and tested positive for COVID on January 7th.  His son-in-law gave him a home pulse ox device to measure his oxygen saturations and he was found to be measuring 87-90% improved on room air.  He called his sister who is a nurse who advised that he present to the ED given his positive COVID status, lung history, and low O2 saturations.  He states that earlier he presented to a critical access hospital where he was found to be hypoxic to 84%.  He is not on home oxygen.  Patient has a COVID risk score of 3.    In the ED patient was found to be hypertensive to 214/101, and 94% on room air.  Significant labs include K 3.4, chloride 91, bicarb 32, glucose 208 should, , . Chest x-ray showed bilateral ground-glass opacities and multifocal infectious versus noninfectious inflammatory process.  He received dexamethasone, hydralazine, and was started on remdesivir.      Overview/Hospital Course:  Patient admitted for acute respiratory failure secondary to  COVID viral pneumonia. Patient started on dexamethasone and remdesivir for COVID treatment. He was started on losartan / HCTZ for HTN and later added amlodipine 5mg daily. He also received hydralazine for elevated Bps on admission. He was found to have a A1c 9.8 and underwent nutrition counseling for diabetes. Patient asked to receive flu and COVID booster vaccines upon discharge.      Interval History: NAEON. Afebrile, HD stable. Elevated AST/ALT on morning labs.    Review of Systems   Constitutional: Positive for chills and fever.   HENT: Negative for congestion, hearing loss, postnasal drip, sinus pressure and sore throat.    Eyes: Negative for pain and visual disturbance.   Respiratory: Positive for cough and shortness of breath. Negative for wheezing.    Cardiovascular: Negative for chest pain, palpitations and leg swelling.   Gastrointestinal: Negative for abdominal pain, constipation, diarrhea, nausea and vomiting.   Genitourinary: Negative for difficulty urinating, dysuria, frequency and urgency.   Skin: Negative for rash.   Neurological: Positive for headaches. Negative for dizziness, seizures, syncope and numbness.   Psychiatric/Behavioral: Negative for confusion and hallucinations.     Objective:     Vital Signs (Most Recent):  Temp: 98.1 °F (36.7 °C) (01/15/22 0350)  Pulse: 60 (01/15/22 0724)  Resp: 18 (01/14/22 2031)  BP: (!) 141/80 (01/15/22 0350)  SpO2: (!) 92 % (01/15/22 0350) Vital Signs (24h Range):  Temp:  [97.1 °F (36.2 °C)-98.7 °F (37.1 °C)] 98.1 °F (36.7 °C)  Pulse:  [60-82] 60  Resp:  [18-20] 18  SpO2:  [92 %-96 %] 92 %  BP: (131-146)/(64-85) 141/80     Weight: 127.9 kg (282 lb)  Body mass index is 40.46 kg/m².    Intake/Output Summary (Last 24 hours) at 1/15/2022 0734  Last data filed at 1/14/2022 1800  Gross per 24 hour   Intake 720 ml   Output --   Net 720 ml      Physical Exam  Constitutional:       Appearance: He is obese.   HENT:      Head: Normocephalic and atraumatic.       Mouth/Throat:      Mouth: Mucous membranes are moist.   Eyes:      General: No scleral icterus.     Extraocular Movements: Extraocular movements intact.      Pupils: Pupils are equal, round, and reactive to light.   Cardiovascular:      Rate and Rhythm: Normal rate and regular rhythm.      Heart sounds: No murmur heard.      Pulmonary:      Effort: No respiratory distress.      Breath sounds: No wheezing, rhonchi or rales.   Abdominal:      General: Bowel sounds are normal.      Palpations: Abdomen is soft.   Musculoskeletal:         General: No swelling.      Cervical back: No rigidity.      Right lower leg: No edema.      Left lower leg: No edema.   Lymphadenopathy:      Cervical: No cervical adenopathy.   Skin:     General: Skin is warm and dry.      Capillary Refill: Capillary refill takes less than 2 seconds.   Neurological:      General: No focal deficit present.      Mental Status: He is alert.   Psychiatric:         Mood and Affect: Mood normal.         Significant Labs: All pertinent labs within the past 24 hours have been reviewed.    Significant Imaging: I have reviewed all pertinent imaging results/findings within the past 24 hours.      Assessment/Plan:      * Pneumonia due to COVID-19 virus  Patient presents to the ED today with 2 weeks cough that he says are similar to his normal chronic bronchitis exacerbations.  Says his wife tested positive for COVID on January 3rd.  At that time he tested negative.  He tested positive for Covid on January 7th after continuing to feel short of breath and not spiking a fever.  He was given a at home pulse ox by family member, and noted that it was reading from 87-90.  Contact the sister was a nurse who suggested that he present to the emergency department.  On arrival to the emergency department patient was found to be hypertensive to 214/101 and satting at 94% on room air.  Patient has a COVID risk score of 3.    -- Covid isolation   -- dexamethasone 6 mg daily for  10 days  -- remdesivir 200 mg load on day 1, followed by 100 mg daily for days 2 through 5  -- vitamin-C 500 mg b.i.d. daily  -- multivitamin daily  -- enoxaparin 120mg b.i.d.  -- supplemental oxygen p.r.n. with oxygen goal >92%    Hyponatremia  Na 130 D3 admission  Ddx: SIADH, pseudohyponatremia    - f/u urine studies  - f/u serum osmolality      Type 2 diabetes mellitus  A1c 9.8 on admit  Patient made aware  Detemir 5mg QHS and SSI  Diabetic diet and diabetic nutrition counseling      Acute hypoxemic respiratory failure  Patient with Hypoxic Respiratory failure which is Acute.  he is not on home oxygen. Supplemental oxygen was provided and noted-  .   Signs/symptoms of respiratory failure include- respiratory distress. Contributing diagnoses includes - obesity Labs and images were reviewed. Patient Has not had a recent ABG. Will treat underlying causes and adjust management of respiratory failure as follows- COVID    See COVID    Severe obesity (BMI >= 40)  Nutrition consulted      Hypokalemia  Patient presented to the ED with K of 3.4.    -- replace p.r.n.  -- trend CMP       Essential hypertension  -- Continue home Losartan-HCTZ 100-25mg daily  -- Cont amlodipine 5mg po daily    Chronic bronchitis  Patient states he has long standing history of Chronic bronchitis. He does not remember when it was first diagnosed. Takes albuterol breathing treatment PRN. States that he began experiencing his usual bronchitis cough and wheeze 2 weeks prior to presentation to ED and that his current symptoms are similar to his normal exacerbation symptoms. He does not use O2 at home.     -- On 2L NC in ED O2 sats of 96%.  -- Duo-nebs q6 while wake  -- Wean O2 as tolerated. Goal O2 88-92%  -- CPT q6h  -- Six minute walk test      VTE Risk Mitigation (From admission, onward)         Ordered     enoxaparin injection 120 mg  2 times daily         01/12/22 0446                Discharge Planning   ARSALAN: 1/16/2022     Code Status: Full  Code   Is the patient medically ready for discharge?: No    Reason for patient still in hospital (select all that apply): Patient new problem, Patient trending condition and Treatment  Discharge Plan A: Home with family                  Jamilah Canales ([place] Name: Enrique), MD  Department of Hospital Medicine   [facility] - Intensive Care ([place]-[facility])

## 2022-01-15 NOTE — ASSESSMENT & PLAN NOTE
A1c 9.8 on admit  Patient made aware  Detemir 5mg QHS and SSI  Diabetic diet and diabetic nutrition counseling

## 2022-01-15 NOTE — SUBJECTIVE & OBJECTIVE
Interval History: NAEON. Afebrile, HD stable. Elevated AST/ALT on morning labs.    Review of Systems   Constitutional: Positive for chills and fever.   HENT: Negative for congestion, hearing loss, postnasal drip, sinus pressure and sore throat.    Eyes: Negative for pain and visual disturbance.   Respiratory: Positive for cough and shortness of breath. Negative for wheezing.    Cardiovascular: Negative for chest pain, palpitations and leg swelling.   Gastrointestinal: Negative for abdominal pain, constipation, diarrhea, nausea and vomiting.   Genitourinary: Negative for difficulty urinating, dysuria, frequency and urgency.   Skin: Negative for rash.   Neurological: Positive for headaches. Negative for dizziness, seizures, syncope and numbness.   Psychiatric/Behavioral: Negative for confusion and hallucinations.     Objective:     Vital Signs (Most Recent):  Temp: 98.1 °F (36.7 °C) (01/15/22 0350)  Pulse: 60 (01/15/22 0724)  Resp: 18 (01/14/22 2031)  BP: (!) 141/80 (01/15/22 0350)  SpO2: (!) 92 % (01/15/22 0350) Vital Signs (24h Range):  Temp:  [97.1 °F (36.2 °C)-98.7 °F (37.1 °C)] 98.1 °F (36.7 °C)  Pulse:  [60-82] 60  Resp:  [18-20] 18  SpO2:  [92 %-96 %] 92 %  BP: (131-146)/(64-85) 141/80     Weight: 127.9 kg (282 lb)  Body mass index is 40.46 kg/m².    Intake/Output Summary (Last 24 hours) at 1/15/2022 0734  Last data filed at 1/14/2022 1800  Gross per 24 hour   Intake 720 ml   Output --   Net 720 ml      Physical Exam  Constitutional:       Appearance: He is obese.   HENT:      Head: Normocephalic and atraumatic.      Mouth/Throat:      Mouth: Mucous membranes are moist.   Eyes:      General: No scleral icterus.     Extraocular Movements: Extraocular movements intact.      Pupils: Pupils are equal, round, and reactive to light.   Cardiovascular:      Rate and Rhythm: Normal rate and regular rhythm.      Heart sounds: No murmur heard.      Pulmonary:      Effort: No respiratory distress.      Breath sounds: No  wheezing, rhonchi or rales.   Abdominal:      General: Bowel sounds are normal.      Palpations: Abdomen is soft.   Musculoskeletal:         General: No swelling.      Cervical back: No rigidity.      Right lower leg: No edema.      Left lower leg: No edema.   Lymphadenopathy:      Cervical: No cervical adenopathy.   Skin:     General: Skin is warm and dry.      Capillary Refill: Capillary refill takes less than 2 seconds.   Neurological:      General: No focal deficit present.      Mental Status: He is alert.   Psychiatric:         Mood and Affect: Mood normal.         Significant Labs: All pertinent labs within the past 24 hours have been reviewed.    Significant Imaging: I have reviewed all pertinent imaging results/findings within the past 24 hours.

## 2022-01-15 NOTE — ASSESSMENT & PLAN NOTE
Na 130 D3 admission  Ddx: SIADH, pseudohyponatremia    - f/u urine studies  - f/u serum osmolality

## 2022-01-15 NOTE — PLAN OF CARE
[facility] - [facility] ([facility]-)  Initial Discharge Assessment       Primary Care Provider: Donaldo Willis MD    Admission Diagnosis: Hypoxia [R09.02]  COVID-19 [U07.1]    Admission Date: 1/12/2022  Expected Discharge Date: 1/16/2022     Payor: BLUE CROSS BLUE SHIELD / Plan: BCBS ALL OUT OF STATE / Product Type: PPO /     Extended Emergency Contact Information  Primary Emergency Contact: Kay Brock  Address: [address] [place] [address] [place]  Mobile Phone: [phone]  Relation: Spouse    Discharge Plan A: Home with family  Discharge Plan B: Home Health      [facility] [place] [facility] [address] [facility]  [address] [facility]  [place] [address]  Phone: [phone] Fax: [phone]      Initial Assessment (most recent)       Adult Discharge Assessment - 01/15/22 0845          Discharge Assessment    Assessment Type Discharge Planning Assessment     Confirmed/corrected address, phone number and insurance Yes     Confirmed Demographics Correct on Facesheet     Source of Information patient     Communicated ARSALAN with patient/caregiver Date not available/Unable to determine     Lives With spouse;child(paul), adult   spouse, Kay Brock ([phone]), & adult son    Do you expect to return to your current living situation? Yes     Do you have help at home or someone to help you manage your care at home? Yes     Prior to hospitilization cognitive status: Alert/Oriented     Current cognitive status: Alert/Oriented     Walking or Climbing Stairs Difficulty none     Dressing/Bathing Difficulty none     Equipment Currently Used at Home other (see comments)   tbd    Readmission within 30 days? No     Patient currently being followed by outpatient case management? No     Do you currently have service(s) that help you manage your care at home? No     Do you take prescription medications? Yes     Do you have prescription coverage? Yes     Do you have any  problems affording any of your prescribed medications? No     Is the patient taking medications as prescribed? yes     How do you get to doctors appointments? family or friend will provide;car, drives self     Are you on dialysis? No     Do you take coumadin? No     Discharge Plan A Home with family     Discharge Plan B Home Health     DME Needed Upon Discharge  other (see comments)   tbd    Discharge Plan discussed with: Patient                     Patient awake & alert sitting on the sofa when CM rounded. No family present. Patient was admitted with pneumonia due to covid. Remdesivir order noted. Pox 92% on 2L O2 this AM.    Patient lives with his wife, Kay Brock, & an adult son, is independent of all ADLs, & denied the need for assistance with transportation at time of discharge.     Hospital follow up appointment scheduled for the patient with Dr. Donaldo Willis (new PCP) on 1/20/2022 at 1400.      Will continue to follow.

## 2022-01-16 VITALS
TEMPERATURE: 98 F | OXYGEN SATURATION: 92 % | RESPIRATION RATE: 20 BRPM | HEIGHT: 70 IN | BODY MASS INDEX: 40.37 KG/M2 | WEIGHT: 282 LBS | SYSTOLIC BLOOD PRESSURE: 122 MMHG | DIASTOLIC BLOOD PRESSURE: 68 MMHG | HEART RATE: 78 BPM

## 2022-01-16 PROBLEM — E87.6 HYPOKALEMIA: Status: RESOLVED | Noted: 2022-01-12 | Resolved: 2022-01-16

## 2022-01-16 PROBLEM — R79.89 ELEVATED LFTS: Status: ACTIVE | Noted: 2022-01-16

## 2022-01-16 PROBLEM — J96.01 ACUTE HYPOXEMIC RESPIRATORY FAILURE: Status: RESOLVED | Noted: 2022-01-13 | Resolved: 2022-01-16

## 2022-01-16 LAB
ALBUMIN SERPL BCP-MCNC: 3.5 G/DL (ref 3.5–5.2)
ALP SERPL-CCNC: 83 U/L (ref 55–135)
ALT SERPL W/O P-5'-P-CCNC: 88 U/L (ref 10–44)
ANION GAP SERPL CALC-SCNC: 8 MMOL/L (ref 8–16)
AST SERPL-CCNC: 83 U/L (ref 10–40)
BASOPHILS # BLD AUTO: 0.03 K/UL (ref 0–0.2)
BASOPHILS NFR BLD: 0.3 % (ref 0–1.9)
BILIRUB SERPL-MCNC: 0.5 MG/DL (ref 0.1–1)
BUN SERPL-MCNC: 21 MG/DL (ref 6–20)
CALCIUM SERPL-MCNC: 9.6 MG/DL (ref 8.7–10.5)
CHLORIDE SERPL-SCNC: 91 MMOL/L (ref 95–110)
CO2 SERPL-SCNC: 35 MMOL/L (ref 23–29)
CREAT SERPL-MCNC: 0.8 MG/DL (ref 0.5–1.4)
D DIMER PPP IA.FEU-MCNC: <0.19 MG/L FEU
DIFFERENTIAL METHOD: ABNORMAL
EOSINOPHIL # BLD AUTO: 0 K/UL (ref 0–0.5)
EOSINOPHIL NFR BLD: 0.2 % (ref 0–8)
ERYTHROCYTE [DISTWIDTH] IN BLOOD BY AUTOMATED COUNT: 13.3 % (ref 11.5–14.5)
EST. GFR  (AFRICAN AMERICAN): >60 ML/MIN/1.73 M^2
EST. GFR  (NON AFRICAN AMERICAN): >60 ML/MIN/1.73 M^2
GLUCOSE SERPL-MCNC: 157 MG/DL (ref 70–110)
HCT VFR BLD AUTO: 46 % (ref 40–54)
HGB BLD-MCNC: 14.3 G/DL (ref 14–18)
IMM GRANULOCYTES # BLD AUTO: 0.05 K/UL (ref 0–0.04)
IMM GRANULOCYTES NFR BLD AUTO: 0.5 % (ref 0–0.5)
LYMPHOCYTES # BLD AUTO: 1.9 K/UL (ref 1–4.8)
LYMPHOCYTES NFR BLD: 20.3 % (ref 18–48)
MCH RBC QN AUTO: 28.1 PG (ref 27–31)
MCHC RBC AUTO-ENTMCNC: 31.1 G/DL (ref 32–36)
MCV RBC AUTO: 91 FL (ref 82–98)
MONOCYTES # BLD AUTO: 0.6 K/UL (ref 0.3–1)
MONOCYTES NFR BLD: 6.3 % (ref 4–15)
NEUTROPHILS # BLD AUTO: 6.9 K/UL (ref 1.8–7.7)
NEUTROPHILS NFR BLD: 72.4 % (ref 38–73)
NRBC BLD-RTO: 0 /100 WBC
PLATELET # BLD AUTO: 328 K/UL (ref 150–450)
PMV BLD AUTO: 9.3 FL (ref 9.2–12.9)
POCT GLUCOSE: 139 MG/DL (ref 70–110)
POCT GLUCOSE: 146 MG/DL (ref 70–110)
POCT GLUCOSE: 238 MG/DL (ref 70–110)
POTASSIUM SERPL-SCNC: 4 MMOL/L (ref 3.5–5.1)
PROT SERPL-MCNC: 7.4 G/DL (ref 6–8.4)
RBC # BLD AUTO: 5.08 M/UL (ref 4.6–6.2)
SODIUM SERPL-SCNC: 134 MMOL/L (ref 136–145)
WBC # BLD AUTO: 9.51 K/UL (ref 3.9–12.7)

## 2022-01-16 PROCEDURE — 99900035 HC TECH TIME PER 15 MIN (STAT)

## 2022-01-16 PROCEDURE — 99239 HOSP IP/OBS DSCHRG MGMT >30: CPT | Mod: ,,, | Performed by: STUDENT IN AN ORGANIZED HEALTH CARE EDUCATION/TRAINING PROGRAM

## 2022-01-16 PROCEDURE — 63600175 PHARM REV CODE 636 W HCPCS

## 2022-01-16 PROCEDURE — 25000003 PHARM REV CODE 250

## 2022-01-16 PROCEDURE — 36415 COLL VENOUS BLD VENIPUNCTURE: CPT

## 2022-01-16 PROCEDURE — 91306 PHARM REV CODE 636 W HCPCS: CPT | Performed by: STUDENT IN AN ORGANIZED HEALTH CARE EDUCATION/TRAINING PROGRAM

## 2022-01-16 PROCEDURE — 94761 N-INVAS EAR/PLS OXIMETRY MLT: CPT

## 2022-01-16 PROCEDURE — 80053 COMPREHEN METABOLIC PANEL: CPT

## 2022-01-16 PROCEDURE — 85379 FIBRIN DEGRADATION QUANT: CPT

## 2022-01-16 PROCEDURE — 90471 IMMUNIZATION ADMIN: CPT | Performed by: STUDENT IN AN ORGANIZED HEALTH CARE EDUCATION/TRAINING PROGRAM

## 2022-01-16 PROCEDURE — 63600175 PHARM REV CODE 636 W HCPCS: Performed by: STUDENT IN AN ORGANIZED HEALTH CARE EDUCATION/TRAINING PROGRAM

## 2022-01-16 PROCEDURE — 99239 PR HOSPITAL DISCHARGE DAY,>30 MIN: ICD-10-PCS | Mod: ,,, | Performed by: STUDENT IN AN ORGANIZED HEALTH CARE EDUCATION/TRAINING PROGRAM

## 2022-01-16 PROCEDURE — 27000221 HC OXYGEN, UP TO 24 HOURS

## 2022-01-16 PROCEDURE — 0064A HC IMMUNIZ ADMIN, SARS-COV-2 COVID-19 VACC, 50MCG/0.25ML, BOOSTER DOSE: CPT | Performed by: STUDENT IN AN ORGANIZED HEALTH CARE EDUCATION/TRAINING PROGRAM

## 2022-01-16 PROCEDURE — 94640 AIRWAY INHALATION TREATMENT: CPT

## 2022-01-16 PROCEDURE — 85025 COMPLETE CBC W/AUTO DIFF WBC: CPT | Performed by: STUDENT IN AN ORGANIZED HEALTH CARE EDUCATION/TRAINING PROGRAM

## 2022-01-16 PROCEDURE — 90686 IIV4 VACC NO PRSV 0.5 ML IM: CPT | Performed by: STUDENT IN AN ORGANIZED HEALTH CARE EDUCATION/TRAINING PROGRAM

## 2022-01-16 RX ORDER — ALBUTEROL SULFATE 90 UG/1
2 AEROSOL, METERED RESPIRATORY (INHALATION) EVERY 6 HOURS PRN
Status: DISCONTINUED | OUTPATIENT
Start: 2022-01-16 | End: 2022-01-16 | Stop reason: HOSPADM

## 2022-01-16 RX ORDER — PEN NEEDLE, DIABETIC 30 GX3/16"
NEEDLE, DISPOSABLE MISCELLANEOUS
Qty: 100 EACH | Refills: 11 | Status: SHIPPED | OUTPATIENT
Start: 2022-01-16 | End: 2022-06-22

## 2022-01-16 RX ADMIN — REMDESIVIR 100 MG: 100 INJECTION, POWDER, LYOPHILIZED, FOR SOLUTION INTRAVENOUS at 09:01

## 2022-01-16 RX ADMIN — ALBUTEROL SULFATE 2 PUFF: 108 INHALANT RESPIRATORY (INHALATION) at 07:01

## 2022-01-16 RX ADMIN — ALBUTEROL SULFATE 2 PUFF: 108 INHALANT RESPIRATORY (INHALATION) at 12:01

## 2022-01-16 RX ADMIN — Medication 500 MG: at 09:01

## 2022-01-16 RX ADMIN — MUPIROCIN: 20 OINTMENT TOPICAL at 09:01

## 2022-01-16 RX ADMIN — INFLUENZA VIRUS VACCINE 0.5 ML: 15; 15; 15; 15 SUSPENSION INTRAMUSCULAR at 04:01

## 2022-01-16 RX ADMIN — DEXAMETHASONE 6 MG: 4 TABLET ORAL at 09:01

## 2022-01-16 RX ADMIN — ENOXAPARIN SODIUM 120 MG: 120 INJECTION SUBCUTANEOUS at 09:01

## 2022-01-16 RX ADMIN — CX-024414 0.25 ML: 0.2 INJECTION, SUSPENSION INTRAMUSCULAR at 04:01

## 2022-01-16 RX ADMIN — LISINOPRIL 5 MG: 5 TABLET ORAL at 09:01

## 2022-01-16 RX ADMIN — MULTIPLE VITAMINS W/ MINERALS TAB 1 TABLET: TAB at 09:01

## 2022-01-16 NOTE — DISCHARGE SUMMARY
[facility] - Intensive Care ([address] Medicine  Discharge Summary      Patient Name: Ned Brock  MRN: [id number]  Patient Class: IP- Inpatient  Admission Date: 1/12/2022  Hospital Length of Stay: 4 days  Discharge Date and Time:  01/16/2022 11:15 AM  Attending Physician: Reed Vela MD   Discharging Provider: Jamilah Canales ([place] Name: MD Marek  Primary Care Provider: Donaldo Willis MD      HPI:   Ned Brock is a 61-year-old male with past medical history of chronic bronchitis, hypertension, BPH with obstruction, and cholecystectomy who presents to the ED with 2 weeks of cough and wheeze productive of yellow sputum, headache, and positive COVID test on January 7th with oxygen saturations of 87-90% measured the home pulse ox.  Patient states that 2 weeks ago he developed a cough with wheeze that is typical for his normal chronic bronchitis exacerbations.  His wife was found to be COVID positive on January 3rd, but he was negative at that time.  The patient continued to feel ill and tested positive for COVID on January 7th.  His son-in-law gave him a home pulse ox device to measure his oxygen saturations and he was found to be measuring 87-90% improved on room air.  He called his sister who is a nurse who advised that he present to the ED given his positive COVID status, lung history, and low O2 saturations.  He states that earlier he presented to a critical access hospital where he was found to be hypoxic to 84%.  He is not on home oxygen.  Patient has a COVID risk score of 3.    In the ED patient was found to be hypertensive to 214/101, and 94% on room air.  Significant labs include K 3.4, chloride 91, bicarb 32, glucose 208 should, , . Chest x-ray showed bilateral ground-glass opacities and multifocal infectious versus noninfectious inflammatory process.  He received dexamethasone, hydralazine, and was started on remdesivir.      * No surgery found *      Hospital  Course:   Patient admitted for acute respiratory failure secondary to COVID viral pneumonia. Patient started on dexamethasone and remdesivir for COVID treatment. He was started on lisinopril 5mg daily. He also received hydralazine for elevated Bps on admission. He was found to have a A1c 9.8 and underwent nutrition counseling for diabetes. Patient with elevated LFTs that were trending up while inpatient, presumed to be secondary to remdesivir. Outpatient CMP ordered before his PCP follow-up to evaluate LFTs. Patient qualified for home oxygen via 6 minute walk test. Home oxygen ordered. Ambulatory sleep medicine referral sent for sleep study to evaluate for YANETH. Patient asked to receive flu and COVID booster vaccines upon discharge. Patient discharged in stable condition with PRN medications for cough and COVID surveillance.     Interval History: NAEON. Afebrile, HD stable. Still on 2L NC. Last day of remdesivir. Hyponatremia trending up.     Review of Systems   Constitutional: Negative for chills and fever.   HENT: Negative for congestion, hearing loss, postnasal drip, sinus pressure and sore throat.    Eyes: Negative for pain and visual disturbance.   Respiratory: Positive for cough and shortness of breath. Negative for wheezing.    Cardiovascular: Negative for chest pain, palpitations and leg swelling.   Gastrointestinal: Negative for abdominal pain, constipation, diarrhea, nausea and vomiting.   Genitourinary: Negative for difficulty urinating, dysuria, frequency and urgency.   Skin: Negative for rash.   Neurological: Negative for dizziness, seizures, syncope, numbness and headaches.   Psychiatric/Behavioral: Negative for confusion and hallucinations.      Objective:      Vital Signs (Most Recent):  Temp: 98.5 °F (36.9 °C) (01/16/22 0335)  Pulse: 62 (01/16/22 0613)  Resp: 19 (01/16/22 0335)  BP: (!) 112/57 (01/16/22 0335)  SpO2: (!) 94 % (01/16/22 0613) Vital Signs (24h Range):  Temp:  [96.7 °F (35.9 °C)-98.5 °F  (36.9 °C)] 98.5 °F (36.9 °C)  Pulse:  [43-88] 62  Resp:  [16-22] 19  SpO2:  [88 %-98 %] 94 %  BP: (112-141)/(57-85) 112/57      Weight: 127.9 kg (282 lb)  Body mass index is 40.46 kg/m².  No intake or output data in the 24 hours ending 01/16/22 0720   Physical Exam  Constitutional:       Appearance: He is obese.   HENT:      Head: Normocephalic and atraumatic.      Mouth/Throat:      Mouth: Mucous membranes are moist.   Eyes:      General: No scleral icterus.     Extraocular Movements: Extraocular movements intact.      Pupils: Pupils are equal, round, and reactive to light.   Cardiovascular:      Rate and Rhythm: Normal rate and regular rhythm.      Heart sounds: No murmur heard.       Pulmonary:      Effort: No respiratory distress.      Breath sounds: No wheezing, rhonchi or rales.   Abdominal:      General: Bowel sounds are normal.      Palpations: Abdomen is soft.   Musculoskeletal:         General: No swelling.      Cervical back: No rigidity.      Right lower leg: No edema.      Left lower leg: No edema.   Lymphadenopathy:      Cervical: No cervical adenopathy.   Skin:     General: Skin is warm and dry.      Capillary Refill: Capillary refill takes less than 2 seconds.   Neurological:      General: No focal deficit present.      Mental Status: He is alert.   Psychiatric:         Mood and Affect: Mood normal.             Goals of Care Treatment Preferences:  Code Status: Full Code      Consults:     * Pneumonia due to COVID-19 virus  Patient presents to the ED today with 2 weeks cough that he says are similar to his normal chronic bronchitis exacerbations.  Says his wife tested positive for COVID on January 3rd.  At that time he tested negative.  He tested positive for Covid on January 7th after continuing to feel short of breath and not spiking a fever.  He was given a at home pulse ox by family member, and noted that it was reading from 87-90.  Contact the sister was a nurse who suggested that he present to  the emergency department.  On arrival to the emergency department patient was found to be hypertensive to 214/101 and satting at 94% on room air.  Patient has a COVID risk score of 3.    -- Covid isolation   -- dexamethasone 6 mg daily for 10 days  -- remdesivir 200 mg load on day 1, followed by 100 mg daily for days 2 through 5  -- vitamin-C 500 mg b.i.d. daily  -- multivitamin daily  -- enoxaparin 120mg b.i.d.  -- supplemental oxygen p.r.n. with oxygen goal >92%    Elevated LFTs  Elevated LFTs at time of admission, trending up while inpatient. Presumed secondary to fatty liver and remdesivir.     Outpt CMP ordered 1/19/22, PCP follow-up 1/20      Hyponatremia  Na 130 D3 admission  Ddx: SIADH, pseudohyponatremia    - Urine studies WNL  - Serum osmolality WNL  - Hyponatremia self-correcting, Na 134 D4 admission      Type 2 diabetes mellitus  A1c 9.8 on admit  Patient made aware  Detemir 5mg QHS and SSI  Diabetic diet and diabetic nutrition counseling      Severe obesity (BMI >= 40)  Nutrition consulted      Essential hypertension  -- lisinopril 5mg daily    Chronic bronchitis  Patient states he has long standing history of Chronic bronchitis. He does not remember when it was first diagnosed. Takes albuterol breathing treatment PRN. States that he began experiencing his usual bronchitis cough and wheeze 2 weeks prior to presentation to ED and that his current symptoms are similar to his normal exacerbation symptoms. He does not use O2 at home.     -- On 2L NC in ED O2 sats of 96%.  -- Duo-nebs q6 while wake  -- Wean O2 as tolerated. Goal O2 88-92%  -- CPT q6h  -- Six minute walk test - qualified for home oxygen    Acute hypoxemic respiratory failure-resolved as of 1/16/2022  Patient with Hypoxic Respiratory failure which is Acute.  he is not on home oxygen. Supplemental oxygen was provided and noted- Oxygen Concentration (%):  [28-32] 28.   Signs/symptoms of respiratory failure include- respiratory distress.  Contributing diagnoses includes - obesity Labs and images were reviewed. Patient Has not had a recent ABG. Will treat underlying causes and adjust management of respiratory failure as follows- COVID    See COVID    Hypokalemia-resolved as of 1/16/2022  Patient presented to the ED with K of 3.4.    -- replace p.r.n.  -- trend CMP         Final Active Diagnoses:    Diagnosis Date Noted POA    PRINCIPAL PROBLEM:  Pneumonia due to COVID-19 virus [U07.1, J12.82] 01/12/2022 Yes    Elevated LFTs [R79.89] 01/16/2022 Yes    Hyponatremia [E87.1] 01/15/2022 No    Type 2 diabetes mellitus [E11.9] 01/13/2022 Yes     Chronic    Chronic bronchitis [J42] 01/12/2022 Yes     Chronic    Essential hypertension [I10] 01/12/2022 Yes     Chronic    Severe obesity (BMI >= 40) [E66.01] 01/12/2022 Yes     Chronic      Problems Resolved During this Admission:    Diagnosis Date Noted Date Resolved POA    Acute hypoxemic respiratory failure [J96.01] 01/13/2022 01/16/2022 Yes    Hypokalemia [E87.6] 01/12/2022 01/16/2022 Yes       Discharged Condition: stable    Disposition: Home or Self Care    Follow Up:   Follow-up Information     Donaldo Willis MD On 1/20/2022.    Specialty: Internal Medicine  Why: at 2:00pm; [facility] follow up appointment  Contact information:  Bryant MCGOWAN YARED  [facility] [id number] [phone]             [facility] PULMONARY MEDICINE. Schedule an appointment as soon as possible for a visit in 1 week.    Specialty: Pulmonology  Contact information:  Bryant Mcgowan yared  [place] [id number] [phone]           Schedule an appointment as soon as possible for a visit with [facility] - Pulmonary/Sleep Med.    Specialty: Sleep Medicine  Contact information:  Bryant Mcgowan yared  [place] [id number] [phone]  Additional information:  [facility] [place]-[address]                     Patient Instructions:      OXYGEN FOR HOME USE     Order Specific Question Answer Comments   Liter Flow 2   "  Duration With activity    Qualifying Test Performed at: Activity    Oxygen saturation at rest 94    Oxygen saturation with activity 85    Oxygen saturation with activity on oxygen 90    Portable mode: pulse dose acceptable    Mode: Portable concentrator    Route nasal cannula    Device: home concentrator with portable concentrator    Length of need (in months): 3 mos    Patient condition with qualifying saturation Other - List qualifying diagnosis and code    Select a diagnosis & list the code in the comments Chronic bronchitis [113899]    Height: 5' 10" (1.778 m)    Weight: 127.9 kg (282 lb)    Alternative treatment measures have been tried or considered and deemed clinically ineffective. Yes      COMPREHENSIVE METABOLIC PANEL   Standing Status: Future Standing Exp. Date: 03/17/23     Ambulatory referral/consult to Pulmonology   Standing Status: Future   Referral Priority: Routine Referral Type: Consultation   Referral Reason: Specialty Services Required   Requested Specialty: Pulmonary Disease   Number of Visits Requested: 1     Ambulatory referral/consult to Sleep Disorders   Standing Status: Future   Referral Priority: Routine Referral Type: Consultation   Requested Specialty: Sleep Medicine   Number of Visits Requested: 1     COVID-19 Surveillance Program     Order Specific Question Answer Comments   Does patient have a smartphone? Yes    Does patient have the [facility] esthela on their smartphone? Yes    While in surveillance program, will patient be using home oxygen? No        Significant Diagnostic Studies: Labs: All labs within the past 24 hours have been reviewed    Pending Diagnostic Studies:     None         Medications:  Reconciled Home Medications:      Medication List      START taking these medications    benzonatate 100 MG capsule  Commonly known as: TESSALON  Take 1 capsule (100 mg total) by mouth 3 (three) times daily as needed for Cough.     blood sugar diagnostic Strp  Use as instructed to check " "blood sugar three times daily before meals and at bedtime (ICD-10-CM: E11.9)     blood-glucose meter Misc  Use as instructed to check blood sugar three times daily before meals and at bedtime (ICD-10-CM: E11.9)     insulin detemir U-100 100 unit/mL (3 mL) Inpn pen  Commonly known as: Levemir FLEXTOUCH  Inject 5 Units into the skin every evening.     lancets 30 gauge Misc  Use as instructed to check blood sugar three times daily before meals and at bedtime (ICD-10-CM: E11.9)     lancing device Misc  Use as instructed to check blood sugar three times daily before meals and at bedtime (ICD-10-CM: E11.9)     lisinopriL 5 MG tablet  Commonly known as: PRINIVIL,ZESTRIL  Take 1 tablet (5 mg total) by mouth once daily.     pen needle, diabetic 32 gauge x 5/32" Ndle  Commonly known as: BD ULTRA-FINE ANDRES PEN NEEDLE  Use with insulin pens to inject insulin under the skin     pulse oximeter device  Commonly known as: pulse oximeter  Use twice daily at 8 AM and 3 PM and record the value in [facility] as directed.        CONTINUE taking these medications    albuterol 0.63 mg/3 mL Nebu  Commonly known as: ACCUNEB  Take 2.5 mg by nebulization every 6 (six) hours as needed (wheezing/shortness of breath). Rescue     CORICIDIN HBP CHEST AKILAH-COUGH ORAL  Take by mouth daily as needed (cough and cold).     fluticasone propionate 50 mcg/actuation nasal spray  Commonly known as: FLONASE  1 spray by Each Nostril route daily as needed for Rhinitis.     guaiFENesin 600 mg 12 hr tablet  Commonly known as: MUCINEX  Take 1,200 mg by mouth 2 (two) times daily as needed for Congestion.     VITAMIN C ORAL  Take 1 capsule by mouth once daily.     VITAMIN D ORAL  Take 1 capsule by mouth once daily.     ZINC ORAL  Take 1 tablet by mouth once daily.        STOP taking these medications    losartan-hydrochlorothiazide 100-25 mg 100-25 mg per tablet  Commonly known as: HYZAAR            Indwelling Lines/Drains at time of discharge:   Lines/Drains/Airways  "    None                 Time spent on the discharge of patient: Greater than 30 minutes         Jamilah Canales ([place] Name: Enrique), MD  Department of Hospital Medicine  [facility] - Intensive Care ([place]-16)

## 2022-01-16 NOTE — NURSING
Home Oxygen Evaluation    Date Performed: 2022    1) Patient's Home O2 Sat on room air, while at rest: 94        If O2 sats on room air at rest are 88% or below, patient qualifies. No additional testing needed. Document N/A in steps 2 and 3. If 89% or above, complete steps 2.      2) Patient's O2 Sat on room air while exercisin        If O2 sats on room air while exercising remain 89% or above patient does not qualify, no further testing needed Document N/A in step 3. If O2 sats on room air while exercising are 88% or below, continue to step 3.      3) Patient's O2 Sat while exercising on O2: 90 at 2 LPM         (Must show improvement from #2 for patients to qualify)    If O2 sats improve on oxygen, patient qualifies for portable oxygen. If not, the patient does not qualify.

## 2022-01-16 NOTE — ASSESSMENT & PLAN NOTE
Na 130 D3 admission  Ddx: SIADH, pseudohyponatremia    - Urine studies WNL  - Serum osmolality WNL  - Hyponatremia self-correcting, Na 134 D4 admission

## 2022-01-16 NOTE — ASSESSMENT & PLAN NOTE
Patient with Hypoxic Respiratory failure which is Acute.  he is not on home oxygen. Supplemental oxygen was provided and noted- Oxygen Concentration (%):  [28-32] 28.   Signs/symptoms of respiratory failure include- respiratory distress. Contributing diagnoses includes - obesity Labs and images were reviewed. Patient Has not had a recent ABG. Will treat underlying causes and adjust management of respiratory failure as follows- COVID    See COVID

## 2022-01-16 NOTE — SUBJECTIVE & OBJECTIVE
Interval History: NAEON. Afebrile, HD stable. Still on 2L NC. Last day of remdesivir. Hyponatremia trending up.    Review of Systems   Constitutional: Negative for chills and fever.   HENT: Negative for congestion, hearing loss, postnasal drip, sinus pressure and sore throat.    Eyes: Negative for pain and visual disturbance.   Respiratory: Positive for cough and shortness of breath. Negative for wheezing.    Cardiovascular: Negative for chest pain, palpitations and leg swelling.   Gastrointestinal: Negative for abdominal pain, constipation, diarrhea, nausea and vomiting.   Genitourinary: Negative for difficulty urinating, dysuria, frequency and urgency.   Skin: Negative for rash.   Neurological: Negative for dizziness, seizures, syncope, numbness and headaches.   Psychiatric/Behavioral: Negative for confusion and hallucinations.     Objective:     Vital Signs (Most Recent):  Temp: 98.5 °F (36.9 °C) (01/16/22 0335)  Pulse: 62 (01/16/22 0613)  Resp: 19 (01/16/22 0335)  BP: (!) 112/57 (01/16/22 0335)  SpO2: (!) 94 % (01/16/22 0613) Vital Signs (24h Range):  Temp:  [96.7 °F (35.9 °C)-98.5 °F (36.9 °C)] 98.5 °F (36.9 °C)  Pulse:  [43-88] 62  Resp:  [16-22] 19  SpO2:  [88 %-98 %] 94 %  BP: (112-141)/(57-85) 112/57     Weight: 127.9 kg (282 lb)  Body mass index is 40.46 kg/m².  No intake or output data in the 24 hours ending 01/16/22 0720   Physical Exam  Constitutional:       Appearance: He is obese.   HENT:      Head: Normocephalic and atraumatic.      Mouth/Throat:      Mouth: Mucous membranes are moist.   Eyes:      General: No scleral icterus.     Extraocular Movements: Extraocular movements intact.      Pupils: Pupils are equal, round, and reactive to light.   Cardiovascular:      Rate and Rhythm: Normal rate and regular rhythm.      Heart sounds: No murmur heard.      Pulmonary:      Effort: No respiratory distress.      Breath sounds: No wheezing, rhonchi or rales.   Abdominal:      General: Bowel sounds are  normal.      Palpations: Abdomen is soft.   Musculoskeletal:         General: No swelling.      Cervical back: No rigidity.      Right lower leg: No edema.      Left lower leg: No edema.   Lymphadenopathy:      Cervical: No cervical adenopathy.   Skin:     General: Skin is warm and dry.      Capillary Refill: Capillary refill takes less than 2 seconds.   Neurological:      General: No focal deficit present.      Mental Status: He is alert.   Psychiatric:         Mood and Affect: Mood normal.         Significant Labs: All pertinent labs within the past 24 hours have been reviewed.    Significant Imaging: I have reviewed all pertinent imaging results/findings within the past 24 hours.

## 2022-01-16 NOTE — ASSESSMENT & PLAN NOTE
Patient states he has long standing history of Chronic bronchitis. He does not remember when it was first diagnosed. Takes albuterol breathing treatment PRN. States that he began experiencing his usual bronchitis cough and wheeze 2 weeks prior to presentation to ED and that his current symptoms are similar to his normal exacerbation symptoms. He does not use O2 at home.     -- On 2L NC in ED O2 sats of 96%.  -- Duo-nebs q6 while wake  -- Wean O2 as tolerated. Goal O2 88-92%  -- CPT q6h  -- Six minute walk test - qualified for home oxygen

## 2022-01-16 NOTE — PLAN OF CARE
Problem: Adult Inpatient Plan of Care  Goal: Plan of Care Review  Outcome: Ongoing, Progressing  Goal: Patient-Specific Goal (Individualized)  Outcome: Ongoing, Progressing  Goal: Absence of Hospital-Acquired Illness or Injury  Outcome: Ongoing, Progressing  Goal: Optimal Comfort and Wellbeing  Outcome: Ongoing, Progressing  Goal: Readiness for Transition of Care  Outcome: Ongoing, Progressing     Problem: Bariatric Environmental Safety  Goal: Safety Maintained with Care  Outcome: Ongoing, Progressing     Problem: Diabetes Comorbidity  Goal: Blood Glucose Level Within Targeted Range  Outcome: Ongoing, Progressing   Pt A&Ox4. VSS. Afebrile. Meds given per MAR. Voices zero unmet needs or concerns at this time. Bed locked and lowered. Call light within reach.

## 2022-01-16 NOTE — ASSESSMENT & PLAN NOTE
Elevated LFTs at time of admission, trending up while inpatient. Presumed secondary to fatty liver and remdesivir.     Outpt CMP ordered 1/19/22, PCP follow-up 1/20

## 2022-01-17 NOTE — NURSING
PT discharged today. Awake, alert, and oriented with no acute distress noted. Reviewed discharge orders including ;medicine orders, prescriptions, followup appts, and patient education materials for diet and diagnosis. Educated patient on administration of insulin with return demonstration. Belongings packed for transport to home. Ambulating out per wheelchair at time of discharge.

## 2022-01-18 ENCOUNTER — NURSE TRIAGE (OUTPATIENT)
Dept: ADMINISTRATIVE | Facility: CLINIC | Age: 62
End: 2022-01-18
Payer: COMMERCIAL

## 2022-01-19 ENCOUNTER — TELEPHONE (OUTPATIENT)
Dept: ADMINISTRATIVE | Facility: CLINIC | Age: 62
End: 2022-01-19
Payer: COMMERCIAL

## 2022-01-19 NOTE — TELEPHONE ENCOUNTER
1st enrollment call - unable to make phone contact - VM left and unable to send my chart message as status is pending.

## 2022-01-19 NOTE — TELEPHONE ENCOUNTER
Pt has had 2 attempts for Covid Surveillance Program enrollment.  [id number] ordered.  Tasks removed.  See PES note.

## 2022-01-20 ENCOUNTER — LAB VISIT (OUTPATIENT)
Dept: LAB | Facility: HOSPITAL | Age: 62
End: 2022-01-20
Attending: INTERNAL MEDICINE
Payer: COMMERCIAL

## 2022-01-20 ENCOUNTER — OFFICE VISIT (OUTPATIENT)
Dept: INTERNAL MEDICINE | Facility: CLINIC | Age: 62
End: 2022-01-20
Payer: COMMERCIAL

## 2022-01-20 VITALS
WEIGHT: 271.38 LBS | DIASTOLIC BLOOD PRESSURE: 74 MMHG | SYSTOLIC BLOOD PRESSURE: 130 MMHG | HEIGHT: 70 IN | HEART RATE: 91 BPM | OXYGEN SATURATION: 93 % | BODY MASS INDEX: 38.85 KG/M2

## 2022-01-20 DIAGNOSIS — E11.9 TYPE 2 DIABETES MELLITUS WITHOUT COMPLICATION, WITHOUT LONG-TERM CURRENT USE OF INSULIN: ICD-10-CM

## 2022-01-20 DIAGNOSIS — R79.89 ELEVATED LFTS: ICD-10-CM

## 2022-01-20 DIAGNOSIS — Z86.16 HISTORY OF COVID-19: ICD-10-CM

## 2022-01-20 DIAGNOSIS — Z09 HOSPITAL DISCHARGE FOLLOW-UP: Primary | ICD-10-CM

## 2022-01-20 LAB
ALBUMIN SERPL BCP-MCNC: 3.9 G/DL (ref 3.5–5.2)
ALP SERPL-CCNC: 86 U/L (ref 55–135)
ALT SERPL W/O P-5'-P-CCNC: 95 U/L (ref 10–44)
ANION GAP SERPL CALC-SCNC: 11 MMOL/L (ref 8–16)
AST SERPL-CCNC: 73 U/L (ref 10–40)
BILIRUB SERPL-MCNC: 0.4 MG/DL (ref 0.1–1)
BUN SERPL-MCNC: 14 MG/DL (ref 6–20)
CALCIUM SERPL-MCNC: 10.1 MG/DL (ref 8.7–10.5)
CHLORIDE SERPL-SCNC: 93 MMOL/L (ref 95–110)
CO2 SERPL-SCNC: 31 MMOL/L (ref 23–29)
CREAT SERPL-MCNC: 0.8 MG/DL (ref 0.5–1.4)
EST. GFR  (AFRICAN AMERICAN): >60 ML/MIN/1.73 M^2
EST. GFR  (NON AFRICAN AMERICAN): >60 ML/MIN/1.73 M^2
GLUCOSE SERPL-MCNC: 129 MG/DL (ref 70–110)
POTASSIUM SERPL-SCNC: 4.5 MMOL/L (ref 3.5–5.1)
PROT SERPL-MCNC: 7.8 G/DL (ref 6–8.4)
SODIUM SERPL-SCNC: 135 MMOL/L (ref 136–145)

## 2022-01-20 PROCEDURE — 3075F PR MOST RECENT SYSTOLIC BLOOD PRESS GE 130-139MM HG: ICD-10-PCS | Mod: CPTII,S$GLB,, | Performed by: INTERNAL MEDICINE

## 2022-01-20 PROCEDURE — 99204 PR OFFICE/OUTPT VISIT, NEW, LEVL IV, 45-59 MIN: ICD-10-PCS | Mod: S$GLB,,, | Performed by: INTERNAL MEDICINE

## 2022-01-20 PROCEDURE — 99204 OFFICE O/P NEW MOD 45 MIN: CPT | Mod: S$GLB,,, | Performed by: INTERNAL MEDICINE

## 2022-01-20 PROCEDURE — 3078F DIAST BP <80 MM HG: CPT | Mod: CPTII,S$GLB,, | Performed by: INTERNAL MEDICINE

## 2022-01-20 PROCEDURE — 1160F PR REVIEW ALL MEDS BY PRESCRIBER/CLIN PHARMACIST DOCUMENTED: ICD-10-PCS | Mod: CPTII,S$GLB,, | Performed by: INTERNAL MEDICINE

## 2022-01-20 PROCEDURE — 3078F PR MOST RECENT DIASTOLIC BLOOD PRESSURE < 80 MM HG: ICD-10-PCS | Mod: CPTII,S$GLB,, | Performed by: INTERNAL MEDICINE

## 2022-01-20 PROCEDURE — 1159F MED LIST DOCD IN RCRD: CPT | Mod: CPTII,S$GLB,, | Performed by: INTERNAL MEDICINE

## 2022-01-20 PROCEDURE — 4010F PR ACE/ARB THEARPY RXD/TAKEN: ICD-10-PCS | Mod: CPTII,S$GLB,, | Performed by: INTERNAL MEDICINE

## 2022-01-20 PROCEDURE — 1159F PR MEDICATION LIST DOCUMENTED IN MEDICAL RECORD: ICD-10-PCS | Mod: CPTII,S$GLB,, | Performed by: INTERNAL MEDICINE

## 2022-01-20 PROCEDURE — 4010F ACE/ARB THERAPY RXD/TAKEN: CPT | Mod: CPTII,S$GLB,, | Performed by: INTERNAL MEDICINE

## 2022-01-20 PROCEDURE — 3008F PR BODY MASS INDEX (BMI) DOCUMENTED: ICD-10-PCS | Mod: CPTII,S$GLB,, | Performed by: INTERNAL MEDICINE

## 2022-01-20 PROCEDURE — 3008F BODY MASS INDEX DOCD: CPT | Mod: CPTII,S$GLB,, | Performed by: INTERNAL MEDICINE

## 2022-01-20 PROCEDURE — 1160F RVW MEDS BY RX/DR IN RCRD: CPT | Mod: CPTII,S$GLB,, | Performed by: INTERNAL MEDICINE

## 2022-01-20 PROCEDURE — 99999 PR PBB SHADOW E&M-EST. PATIENT-LVL V: CPT | Mod: PBBFAC,,, | Performed by: INTERNAL MEDICINE

## 2022-01-20 PROCEDURE — 3046F HEMOGLOBIN A1C LEVEL >9.0%: CPT | Mod: CPTII,S$GLB,, | Performed by: INTERNAL MEDICINE

## 2022-01-20 PROCEDURE — 36415 COLL VENOUS BLD VENIPUNCTURE: CPT | Performed by: INTERNAL MEDICINE

## 2022-01-20 PROCEDURE — 99999 PR PBB SHADOW E&M-EST. PATIENT-LVL V: ICD-10-PCS | Mod: PBBFAC,,, | Performed by: INTERNAL MEDICINE

## 2022-01-20 PROCEDURE — 3075F SYST BP GE 130 - 139MM HG: CPT | Mod: CPTII,S$GLB,, | Performed by: INTERNAL MEDICINE

## 2022-01-20 PROCEDURE — 3046F PR MOST RECENT HEMOGLOBIN A1C LEVEL > 9.0%: ICD-10-PCS | Mod: CPTII,S$GLB,, | Performed by: INTERNAL MEDICINE

## 2022-01-20 PROCEDURE — 80053 COMPREHEN METABOLIC PANEL: CPT | Performed by: INTERNAL MEDICINE

## 2022-01-20 RX ORDER — INSULIN PUMP SYRINGE, 3 ML
EACH MISCELLANEOUS
Qty: 1 EACH | Refills: 0 | Status: SHIPPED | OUTPATIENT
Start: 2022-01-20 | End: 2022-04-18

## 2022-01-20 RX ORDER — GUAIFENESIN/DEXTROMETHORPHAN 100-10MG/5
5 SYRUP ORAL EVERY 6 HOURS
Qty: 236 ML | Refills: 1 | Status: SHIPPED | OUTPATIENT
Start: 2022-01-20 | End: 2022-01-30

## 2022-01-20 RX ORDER — FLASH GLUCOSE SENSOR
KIT MISCELLANEOUS
Qty: 1 KIT | Refills: 11 | Status: SHIPPED | OUTPATIENT
Start: 2022-01-20 | End: 2022-07-12 | Stop reason: SDUPTHER

## 2022-01-20 RX ORDER — FLASH GLUCOSE SCANNING READER
EACH MISCELLANEOUS
Qty: 1 EACH | Refills: 0 | Status: SHIPPED | OUTPATIENT
Start: 2022-01-20 | End: 2022-06-22

## 2022-01-20 RX ORDER — LANCETS
1 EACH MISCELLANEOUS 2 TIMES DAILY WITH MEALS
Qty: 100 EACH | Refills: 11 | Status: SHIPPED | OUTPATIENT
Start: 2022-01-20 | End: 2022-04-18

## 2022-01-20 RX ORDER — LANCING DEVICE
1 EACH MISCELLANEOUS 2 TIMES DAILY WITH MEALS
Qty: 1 EACH | Refills: 0 | Status: SHIPPED | OUTPATIENT
Start: 2022-01-20 | End: 2022-04-18

## 2022-01-20 NOTE — PROGRESS NOTES
"    CHIEF COMPLAINT     Chief Complaint   Patient presents with    [facility]     F/u  from hospital. Had pneumonia b/c of covid. Hospital stay for 5 days       HPI     Ned Brock is a 61 y.o. male here today for   hypertension, chronic bronchitis morbid obesity BPH new diagnosis of type 2 diabetes who was recently hospitalized for COVID-19 here today for hospital follow-up    Has been lost primary care for approximately 1 year due to his internist in COVID retiring    Discharge summary personally reviewed notable for hospitalization with COVID-19 he was treated with dexamethasone remdesivir anticoagulation with improvement.  Patient was able to wean to lower level oxygen was discharged home safely.  Has referral to pulmonology, endocrine and Sleep Medicine      Personally Reviewed Patient's Medical, surgical, family and social hx. Changes updated in [employer].  Care Team updated in Epic    Review of Systems:  Review of Systems   Respiratory: Positive for shortness of breath.    Endocrine: Negative for polydipsia and polyphagia.       Health Maintenance:   Reviewed with patient  Due for the following:      PHYSICAL EXAM     /74 (BP Location: Right arm, Patient Position: Sitting, BP Method: Large (Manual))   Pulse 91   Ht 5' 10" (1.778 m)   Wt 123.1 kg (271 lb 6.2 oz)   SpO2 (!) 93%   BMI 38.94 kg/m²     Gen: Well Appearing, NAD  HEENT: PERR, EOMI  Neck: FROM, no thyromegaly, no cervical adenopathy  CVD: RRR, no M/R/G  Pulm: Normal work of breathing, CTAB, no wheezing  Abd:  Soft, NT, ND non TTP, no mass  MSK: no LE edema  Neuro: A&Ox3, gait normal, speech normal  Mood; Mood normal, behavior normal, thought process linear       LABS     Labs reviewed; Notable for  CMP  Sodium   Date Value Ref Range Status   01/16/2022 134 (L) 136 - 145 mmol/L Final     Potassium   Date Value Ref Range Status   01/16/2022 4.0 3.5 - 5.1 mmol/L Final     Chloride   Date Value Ref Range Status   01/16/2022 91 (L) 95 - 110 " mmol/L Final     CO2   Date Value Ref Range Status   01/16/2022 35 (H) 23 - 29 mmol/L Final     Glucose   Date Value Ref Range Status   01/16/2022 157 (H) 70 - 110 mg/dL Final     BUN   Date Value Ref Range Status   01/16/2022 21 (H) 6 - 20 mg/dL Final     Creatinine   Date Value Ref Range Status   01/16/2022 0.8 0.5 - 1.4 mg/dL Final     Calcium   Date Value Ref Range Status   01/16/2022 9.6 8.7 - 10.5 mg/dL Final     Total Protein   Date Value Ref Range Status   01/16/2022 7.4 6.0 - 8.4 g/dL Final     Albumin   Date Value Ref Range Status   01/16/2022 3.5 3.5 - 5.2 g/dL Final     Total Bilirubin   Date Value Ref Range Status   01/16/2022 0.5 0.1 - 1.0 mg/dL Final     Comment:     For infants and newborns, interpretation of results should be based  on gestational age, weight and in agreement with clinical  observations.    Premature Infant recommended reference ranges:  Up to 24 hours.............<8.0 mg/dL  Up to 48 hours............<12.0 mg/dL  3-5 days..................<15.0 mg/dL  6-29 days.................<15.0 mg/dL       Alkaline Phosphatase   Date Value Ref Range Status   01/16/2022 83 55 - 135 U/L Final     AST   Date Value Ref Range Status   01/16/2022 83 (H) 10 - 40 U/L Final     ALT   Date Value Ref Range Status   01/16/2022 88 (H) 10 - 44 U/L Final     Anion Gap   Date Value Ref Range Status   01/16/2022 8 8 - 16 mmol/L Final     eGFR if    Date Value Ref Range Status   01/16/2022 >60.0 >60 mL/min/1.73 m^2 Final     eGFR if non    Date Value Ref Range Status   01/16/2022 >60.0 >60 mL/min/1.73 m^2 Final     Comment:     Calculation used to obtain the estimated glomerular filtration  rate (eGFR) is the CKD-EPI equation.        Lab Results   Component Value Date    HGBA1C 9.8 (H) 01/13/2022         ASSESSMENT     1. Hospital discharge follow-up     2. History of COVID-19  Ambulatory referral/consult to Physical/Occupational Therapy    dextromethorphan-guaiFENesin   mg/5 ml (ROBITUSSIN-DM)  mg/5 mL liquid   3. Elevated LFTs  Comprehensive Metabolic Panel   4. Type 2 diabetes mellitus without complication, without long-term current use of insulin  Ambulatory referral/consult to Diabetes Education    flash glucose sensor (FREESTYLE JANETH 14 DAY SENSOR) Kit    flash glucose scanning reader (FREESTYLE JANETH 14 DAY READER) Misc    blood-glucose meter (FREESTYLE FREEDOM) kit    Hemoglobin A1C    Lipid Panel    Hepatitis C Antibody    blood-glucose meter kit    blood sugar diagnostic Strp    lancing device Misc    lancets Misc           Plan     Ned Brock is a 61 y.o. male with hypertension, chronic bronchitis morbid obesity new diagnosis of type 2 diabetes who was recently hospitalized for COVID-19 here today for hospital follow-up    1. Hospital discharge follow-up  Pt contacted with 48 hours of dischaarge  Symptoms required hospitalization improving, medications reconciled and follow up appts need to be  scheduled and verified with patient.    2. History of COVID-19  Clinically improving still on oxygen. 91-95% with activity Will refer to physical therapy  Was not on o2 prior to covid so will hopefully wean off   - Ambulatory referral/consult to Physical/Occupational Therapy; Future  - dextromethorphan-guaiFENesin  mg/5 ml (ROBITUSSIN-DM)  mg/5 mL liquid; Take 5 mLs by mouth every 6 (six) hours. for 10 days  Dispense: 236 mL; Refill: 1    3. Elevated LFTs  Was elevated and uptrending at D/C  Will repeat today  - Comprehensive Metabolic Panel; Future    4. Type 2 diabetes mellitus without complication, without long-term current use of insulin  New diagnosis  Will refer to DM education  Ordered testing supplies  Will have pt f/u in 1m for 2' EO screening and regimen optimization  Continue levemir 5units for now  Anticipate starting GLP1A at next visit  - Ambulatory referral/consult to Diabetes Education; Future  - flash glucose sensor (FREESTYLE JANETH 14 DAY  SENSOR) Kit; IU  Dispense: 1 kit; Refill: 11  - flash glucose scanning reader (FREESTYLE JANETH 14 DAY READER) Misc; IU  Dispense: 1 each; Refill: 0  - blood-glucose meter (FREESTYLE FREEDOM) kit; Use as instructed  Dispense: 1 each; Refill: 0  - Hemoglobin A1C; Future  - Lipid Panel; Future  - Hepatitis C Antibody; Future      Donaldo Willis MD

## 2022-01-21 ENCOUNTER — TELEPHONE (OUTPATIENT)
Dept: DIABETES | Facility: CLINIC | Age: 62
End: 2022-01-21
Payer: COMMERCIAL

## 2022-01-25 ENCOUNTER — TELEPHONE (OUTPATIENT)
Dept: SLEEP MEDICINE | Facility: CLINIC | Age: 62
End: 2022-01-25
Payer: COMMERCIAL

## 2022-01-26 ENCOUNTER — TELEPHONE (OUTPATIENT)
Dept: SLEEP MEDICINE | Facility: CLINIC | Age: 62
End: 2022-01-26
Payer: COMMERCIAL

## 2022-01-26 DIAGNOSIS — J42 CHRONIC BRONCHITIS, UNSPECIFIED CHRONIC BRONCHITIS TYPE: Primary | ICD-10-CM

## 2022-01-27 ENCOUNTER — OFFICE VISIT (OUTPATIENT)
Dept: PULMONOLOGY | Facility: CLINIC | Age: 62
End: 2022-01-27
Payer: COMMERCIAL

## 2022-01-27 ENCOUNTER — HOSPITAL ENCOUNTER (OUTPATIENT)
Dept: PULMONOLOGY | Facility: CLINIC | Age: 62
Discharge: HOME | End: 2022-01-27
Payer: COMMERCIAL

## 2022-01-27 ENCOUNTER — TELEPHONE (OUTPATIENT)
Dept: INTERNAL MEDICINE | Facility: CLINIC | Age: 62
End: 2022-01-27
Payer: COMMERCIAL

## 2022-01-27 ENCOUNTER — OFFICE VISIT (OUTPATIENT)
Dept: SLEEP MEDICINE | Facility: CLINIC | Age: 62
End: 2022-01-27
Payer: COMMERCIAL

## 2022-01-27 VITALS
HEART RATE: 110 BPM | WEIGHT: 274.94 LBS | DIASTOLIC BLOOD PRESSURE: 79 MMHG | OXYGEN SATURATION: 95 % | HEIGHT: 70 IN | BODY MASS INDEX: 39.36 KG/M2 | SYSTOLIC BLOOD PRESSURE: 120 MMHG

## 2022-01-27 DIAGNOSIS — I10 ESSENTIAL HYPERTENSION: ICD-10-CM

## 2022-01-27 DIAGNOSIS — E11.69 TYPE 2 DIABETES MELLITUS WITH OTHER SPECIFIED COMPLICATION, WITHOUT LONG-TERM CURRENT USE OF INSULIN: ICD-10-CM

## 2022-01-27 DIAGNOSIS — R05.3 PERSISTENT COUGH: Primary | ICD-10-CM

## 2022-01-27 DIAGNOSIS — U07.1 PNEUMONIA DUE TO COVID-19 VIRUS: ICD-10-CM

## 2022-01-27 DIAGNOSIS — J12.82 PNEUMONIA DUE TO COVID-19 VIRUS: ICD-10-CM

## 2022-01-27 DIAGNOSIS — R06.83 SNORING: Primary | ICD-10-CM

## 2022-01-27 DIAGNOSIS — R06.02 SOB (SHORTNESS OF BREATH): Primary | ICD-10-CM

## 2022-01-27 DIAGNOSIS — G47.10 HYPERSOMNIA: ICD-10-CM

## 2022-01-27 DIAGNOSIS — J42 CHRONIC BRONCHITIS, UNSPECIFIED CHRONIC BRONCHITIS TYPE: ICD-10-CM

## 2022-01-27 DIAGNOSIS — E66.01 SEVERE OBESITY (BMI >= 40): ICD-10-CM

## 2022-01-27 DIAGNOSIS — J42 CHRONIC BRONCHITIS, UNSPECIFIED CHRONIC BRONCHITIS TYPE: Chronic | ICD-10-CM

## 2022-01-27 DIAGNOSIS — G47.30 SLEEP APNEA, UNSPECIFIED TYPE: ICD-10-CM

## 2022-01-27 DIAGNOSIS — G47.33 OSA (OBSTRUCTIVE SLEEP APNEA): ICD-10-CM

## 2022-01-27 DIAGNOSIS — R06.02 SOB (SHORTNESS OF BREATH): ICD-10-CM

## 2022-01-27 PROCEDURE — 94729 DIFFUSING CAPACITY: CPT | Mod: S$GLB,,, | Performed by: INTERNAL MEDICINE

## 2022-01-27 PROCEDURE — 94010 BREATHING CAPACITY TEST: CPT | Mod: S$GLB,,, | Performed by: INTERNAL MEDICINE

## 2022-01-27 PROCEDURE — 1159F MED LIST DOCD IN RCRD: CPT | Mod: CPTII,S$GLB,, | Performed by: INTERNAL MEDICINE

## 2022-01-27 PROCEDURE — 99999 PR PBB SHADOW E&M-EST. PATIENT-LVL II: CPT | Mod: PBBFAC,,, | Performed by: INTERNAL MEDICINE

## 2022-01-27 PROCEDURE — 3046F HEMOGLOBIN A1C LEVEL >9.0%: CPT | Mod: CPTII,S$GLB,, | Performed by: INTERNAL MEDICINE

## 2022-01-27 PROCEDURE — 1160F PR REVIEW ALL MEDS BY PRESCRIBER/CLIN PHARMACIST DOCUMENTED: ICD-10-PCS | Mod: 95,CPTII,S$GLB, | Performed by: INTERNAL MEDICINE

## 2022-01-27 PROCEDURE — 99999 PR PBB SHADOW E&M-EST. PATIENT-LVL V: CPT | Mod: PBBFAC,,, | Performed by: INTERNAL MEDICINE

## 2022-01-27 PROCEDURE — 94729 PR C02/MEMBANE DIFFUSE CAPACITY: ICD-10-PCS | Mod: S$GLB,,, | Performed by: INTERNAL MEDICINE

## 2022-01-27 PROCEDURE — 3074F SYST BP LT 130 MM HG: CPT | Mod: CPTII,S$GLB,, | Performed by: INTERNAL MEDICINE

## 2022-01-27 PROCEDURE — 1159F PR MEDICATION LIST DOCUMENTED IN MEDICAL RECORD: ICD-10-PCS | Mod: CPTII,S$GLB,, | Performed by: INTERNAL MEDICINE

## 2022-01-27 PROCEDURE — 94010 BREATHING CAPACITY TEST: ICD-10-PCS | Mod: S$GLB,,, | Performed by: INTERNAL MEDICINE

## 2022-01-27 PROCEDURE — 3046F PR MOST RECENT HEMOGLOBIN A1C LEVEL > 9.0%: ICD-10-PCS | Mod: 95,CPTII,S$GLB, | Performed by: INTERNAL MEDICINE

## 2022-01-27 PROCEDURE — 99204 OFFICE O/P NEW MOD 45 MIN: CPT | Mod: S$GLB,,, | Performed by: INTERNAL MEDICINE

## 2022-01-27 PROCEDURE — 99203 PR OFFICE/OUTPT VISIT, NEW, LEVL III, 30-44 MIN: ICD-10-PCS | Mod: 95,S$GLB,, | Performed by: INTERNAL MEDICINE

## 2022-01-27 PROCEDURE — 3008F BODY MASS INDEX DOCD: CPT | Mod: CPTII,S$GLB,, | Performed by: INTERNAL MEDICINE

## 2022-01-27 PROCEDURE — 1111F DSCHRG MED/CURRENT MED MERGE: CPT | Mod: 95,CPTII,S$GLB, | Performed by: INTERNAL MEDICINE

## 2022-01-27 PROCEDURE — 1159F PR MEDICATION LIST DOCUMENTED IN MEDICAL RECORD: ICD-10-PCS | Mod: 95,CPTII,S$GLB, | Performed by: INTERNAL MEDICINE

## 2022-01-27 PROCEDURE — 3074F PR MOST RECENT SYSTOLIC BLOOD PRESSURE < 130 MM HG: ICD-10-PCS | Mod: CPTII,S$GLB,, | Performed by: INTERNAL MEDICINE

## 2022-01-27 PROCEDURE — 3046F HEMOGLOBIN A1C LEVEL >9.0%: CPT | Mod: 95,CPTII,S$GLB, | Performed by: INTERNAL MEDICINE

## 2022-01-27 PROCEDURE — 4010F PR ACE/ARB THEARPY RXD/TAKEN: ICD-10-PCS | Mod: CPTII,S$GLB,, | Performed by: INTERNAL MEDICINE

## 2022-01-27 PROCEDURE — 4010F ACE/ARB THERAPY RXD/TAKEN: CPT | Mod: 95,CPTII,S$GLB, | Performed by: INTERNAL MEDICINE

## 2022-01-27 PROCEDURE — 3008F PR BODY MASS INDEX (BMI) DOCUMENTED: ICD-10-PCS | Mod: CPTII,S$GLB,, | Performed by: INTERNAL MEDICINE

## 2022-01-27 PROCEDURE — 3078F PR MOST RECENT DIASTOLIC BLOOD PRESSURE < 80 MM HG: ICD-10-PCS | Mod: CPTII,S$GLB,, | Performed by: INTERNAL MEDICINE

## 2022-01-27 PROCEDURE — 99203 OFFICE O/P NEW LOW 30 MIN: CPT | Mod: 95,S$GLB,, | Performed by: INTERNAL MEDICINE

## 2022-01-27 PROCEDURE — 4010F PR ACE/ARB THEARPY RXD/TAKEN: ICD-10-PCS | Mod: 95,CPTII,S$GLB, | Performed by: INTERNAL MEDICINE

## 2022-01-27 PROCEDURE — 3078F DIAST BP <80 MM HG: CPT | Mod: CPTII,S$GLB,, | Performed by: INTERNAL MEDICINE

## 2022-01-27 PROCEDURE — 1111F DSCHRG MED/CURRENT MED MERGE: CPT | Mod: CPTII,S$GLB,, | Performed by: INTERNAL MEDICINE

## 2022-01-27 PROCEDURE — 4010F ACE/ARB THERAPY RXD/TAKEN: CPT | Mod: CPTII,S$GLB,, | Performed by: INTERNAL MEDICINE

## 2022-01-27 PROCEDURE — 1160F RVW MEDS BY RX/DR IN RCRD: CPT | Mod: 95,CPTII,S$GLB, | Performed by: INTERNAL MEDICINE

## 2022-01-27 PROCEDURE — 99999 PR PBB SHADOW E&M-EST. PATIENT-LVL V: ICD-10-PCS | Mod: PBBFAC,,, | Performed by: INTERNAL MEDICINE

## 2022-01-27 PROCEDURE — 1111F PR DISCHARGE MEDS RECONCILED W/ CURRENT OUTPATIENT MED LIST: ICD-10-PCS | Mod: CPTII,S$GLB,, | Performed by: INTERNAL MEDICINE

## 2022-01-27 PROCEDURE — 3046F PR MOST RECENT HEMOGLOBIN A1C LEVEL > 9.0%: ICD-10-PCS | Mod: CPTII,S$GLB,, | Performed by: INTERNAL MEDICINE

## 2022-01-27 PROCEDURE — 99999 PR PBB SHADOW E&M-EST. PATIENT-LVL II: ICD-10-PCS | Mod: PBBFAC,,, | Performed by: INTERNAL MEDICINE

## 2022-01-27 PROCEDURE — 1159F MED LIST DOCD IN RCRD: CPT | Mod: 95,CPTII,S$GLB, | Performed by: INTERNAL MEDICINE

## 2022-01-27 PROCEDURE — 1111F PR DISCHARGE MEDS RECONCILED W/ CURRENT OUTPATIENT MED LIST: ICD-10-PCS | Mod: 95,CPTII,S$GLB, | Performed by: INTERNAL MEDICINE

## 2022-01-27 PROCEDURE — 99204 PR OFFICE/OUTPT VISIT, NEW, LEVL IV, 45-59 MIN: ICD-10-PCS | Mod: S$GLB,,, | Performed by: INTERNAL MEDICINE

## 2022-01-27 RX ORDER — FLUTICASONE PROPIONATE 110 UG/1
1 AEROSOL, METERED RESPIRATORY (INHALATION) 2 TIMES DAILY
Qty: 12 G | Refills: 5 | Status: SHIPPED | OUTPATIENT
Start: 2022-01-27 | End: 2022-03-28

## 2022-01-27 RX ORDER — PROMETHAZINE HYDROCHLORIDE AND CODEINE PHOSPHATE 6.25; 1 MG/5ML; MG/5ML
5 SOLUTION ORAL EVERY 6 HOURS PRN
Qty: 140 ML | Refills: 0 | Status: SHIPPED | OUTPATIENT
Start: 2022-01-27 | End: 2022-02-03

## 2022-01-27 NOTE — PROGRESS NOTES
Subjective:       Patient ID: Ned Brock is a 61 y.o. male.    Chief Complaint: Sleep Apnea    I had the pleasure of seeing Ned Brock today, who is a 61 y.o. male that presents with non-restful sleep.    Ned Brock does not have a CDL.    Ned Brock is not a shift worker.    Ned Brock presents with has interrupted sleep that has been going on for 12 months   Original note lost due to system error    Bedtime when working ranges from 2100 to 2300.   When not working, bedtime ranges from 2200 to 2300.   Sleep latency ranges from 10 to 20 minutes.     Average number of awakenings is 3-4 and return to sleep is variable.   Wake up time when working is 0600 to 0700.   When not working, wake up time is 0700 to 0900.   Patient does not feel rested upon awakening.      Medications taken for sleep currently: none  Previous medications taken: none     Ned Brock does experience daytime sleepiness.   Naps are taken about 0 times weekly, usually lasting NA to NA minutes.  Ned currently does operate an automobile.  Ned Brock does experience drowsiness when driving.   Patient does doze off when sedentary.   Ned Brock does not have auxiliary symptoms of narcolepsy including sleep onset paralysis, hypnagogic hallucinations, sleep attacks and cataplexy  ESS 14    Ned Brock has a history of snoring.   Snoring is described as moderate and constant.   Apneic episodes have been noticed during sleep.   A witness to sleep is present.   The patient awakens with mouth dryness.      Ned Brock does not have symptoms of Restless Legs Syndrome. Nocturnal leg movements have not been noticed.   The patient does not experience sleep related leg cramps.   There is not a history of parasomnia.      Current Outpatient Medications:     albuterol (ACCUNEB) 0.63 mg/3 mL Nebu, Take 2.5 mg by nebulization every 6 (six) hours as needed (wheezing/shortness of breath). Rescue, Disp: , Rfl:     ascorbic acid (VITAMIN C  ORAL), Take 1 capsule by mouth once daily., Disp: , Rfl:     blood sugar diagnostic Strp, Use as instructed to check blood sugar three times daily before meals and at bedtime (ICD-10-CM: E11.9), Disp: 100 strip, Rfl: 11    blood sugar diagnostic Strp, 1 strip by Misc.(Non-Drug; Combo Route) route 2 (two) times daily with meals., Disp: 100 strip, Rfl: 11    blood-glucose meter (FREESTYLE FREEDOM) kit, Use as instructed, Disp: 1 each, Rfl: 0    blood-glucose meter kit, Use as instructed, Disp: 1 each, Rfl: 0    dextromethorphan-guaiFENesin  mg/5 ml (ROBITUSSIN-DM)  mg/5 mL liquid, Take 5 mLs by mouth every 6 (six) hours. for 10 days, Disp: 236 mL, Rfl: 1    ergocalciferol, vitamin D2, (VITAMIN D ORAL), Take 1 capsule by mouth once daily., Disp: , Rfl:     flash glucose scanning reader (FREESTYLE JANETH 14 DAY READER) Misc, IU, Disp: 1 each, Rfl: 0    flash glucose sensor (FREESTYLE JANETH 14 DAY SENSOR) Kit, IU, Disp: 1 kit, Rfl: 11    fluticasone propionate (FLONASE) 50 mcg/actuation nasal spray, 1 spray by Each Nostril route daily as needed for Rhinitis., Disp: , Rfl:     guaiFENesin (MUCINEX) 600 mg 12 hr tablet, Take 1,200 mg by mouth 2 (two) times daily as needed for Congestion., Disp: , Rfl:     insulin detemir U-100 (LEVEMIR FLEXTOUCH) 100 unit/mL (3 mL) SubQ InPn pen, Inject 5 Units into the skin every evening., Disp: 3 mL, Rfl: 1    lancets 30 gauge Misc, Use as instructed to check blood sugar three times daily before meals and at bedtime (ICD-10-CM: E11.9) (Patient taking differently: Use as instructed to check blood sugar three times daily before meals and at bedtime (ICD-10-CM: E11.9)), Disp: 100 each, Rfl: 11    lancets Misc, 1 lancet by Misc.(Non-Drug; Combo Route) route 2 (two) times daily with meals., Disp: 100 each, Rfl: 11    lancing device Misc, Use as instructed to check blood sugar three times daily before meals and at bedtime (ICD-10-CM: E11.9), Disp: 1 each, Rfl: 0     "lancing device Misc, 1 Device by Misc.(Non-Drug; Combo Route) route 2 (two) times daily with meals., Disp: 1 each, Rfl: 0    lisinopriL (PRINIVIL,ZESTRIL) 5 MG tablet, Take 1 tablet (5 mg total) by mouth once daily., Disp: 90 tablet, Rfl: 3    pen needle, diabetic (BD ULTRA-FINE ANDRES PEN NEEDLE) 32 gauge x 5/32" Ndle, Use with insulin pens to inject insulin under the skin, Disp: 100 each, Rfl: 11    pulse oximeter (PULSE OXIMETER) device, Use twice daily at 8 AM and 3 PM and record the value in [employer] as directed., Disp: 1 each, Rfl: 0    ZINC ORAL, Take 1 tablet by mouth once daily., Disp: , Rfl:      Review of patient's allergies indicates:  No Known Allergies      Past Medical History:   Diagnosis Date    BPH without obstruction/lower urinary tract symptoms     Elevated PSA     Hypertension        Past Surgical History:   Procedure Laterality Date    CHOLECYSTECTOMY      CYST REMOVAL      EYE SURGERY         Family History   Problem Relation Age of Onset    Breast cancer Mother        Social History     Socioeconomic History    Marital status:    Tobacco Use    Smoking status: Never Smoker    Smokeless tobacco: Never Used   Substance and Sexual Activity    Alcohol use: No    Drug use: No           Old medical records.    There were no vitals filed for this visit.           The patient was given open opportunity to ask questions and/or express concerns about treatment plan.   All questions/concerns were discussed.   Driving precautions were provided.     Two patient identifiers used prior to evaluation.    Thank you for referring Ned Brock for evaluation.             Past Medical History:   Diagnosis Date    BPH without obstruction/lower urinary tract symptoms     Elevated PSA     Hypertension      Past Surgical History:   Procedure Laterality Date    CHOLECYSTECTOMY      CYST REMOVAL      EYE SURGERY       Family History   Problem Relation Age of Onset    Breast cancer Mother  "     Social History     Socioeconomic History    Marital status:    Tobacco Use    Smoking status: Never Smoker    Smokeless tobacco: Never Used   Substance and Sexual Activity    Alcohol use: No    Drug use: No       Current Outpatient Medications   Medication Sig Dispense Refill    albuterol (ACCUNEB) 0.63 mg/3 mL Nebu Take 2.5 mg by nebulization every 6 (six) hours as needed (wheezing/shortness of breath). Rescue      ascorbic acid (VITAMIN C ORAL) Take 1 capsule by mouth once daily.      blood sugar diagnostic Strp Use as instructed to check blood sugar three times daily before meals and at bedtime (ICD-10-CM: E11.9) 100 strip 11    blood sugar diagnostic Strp 1 strip by Misc.(Non-Drug; Combo Route) route 2 (two) times daily with meals. 100 strip 11    blood-glucose meter (FREESTYLE FREEDOM) kit Use as instructed 1 each 0    blood-glucose meter kit Use as instructed 1 each 0    dextromethorphan-guaiFENesin  mg/5 ml (ROBITUSSIN-DM)  mg/5 mL liquid Take 5 mLs by mouth every 6 (six) hours. for 10 days 236 mL 1    ergocalciferol, vitamin D2, (VITAMIN D ORAL) Take 1 capsule by mouth once daily.      flash glucose scanning reader (FREESTYLE JANETH 14 DAY READER) [facility] IU 1 each 0    flash glucose sensor (FREESTYLE JANETH 14 DAY SENSOR) Kit IU 1 kit 11    fluticasone propionate (FLONASE) 50 mcg/actuation nasal spray 1 spray by Each Nostril route daily as needed for Rhinitis.      guaiFENesin (MUCINEX) 600 mg 12 hr tablet Take 1,200 mg by mouth 2 (two) times daily as needed for Congestion.      insulin detemir U-100 (LEVEMIR FLEXTOUCH) 100 unit/mL (3 mL) SubQ InPn pen Inject 5 Units into the skin every evening. 3 mL 1    lancets 30 gauge Misc Use as instructed to check blood sugar three times daily before meals and at bedtime (ICD-10-CM: E11.9) (Patient taking differently: Use as instructed to check blood sugar three times daily before meals and at bedtime (ICD-10-CM: E11.9)) 100 each  "11    lancets Misc 1 lancet by Misc.(Non-Drug; Combo Route) route 2 (two) times daily with meals. 100 each 11    lancing device Misc Use as instructed to check blood sugar three times daily before meals and at bedtime (ICD-10-CM: E11.9) 1 each 0    lancing device Misc 1 Device by Misc.(Non-Drug; Combo Route) route 2 (two) times daily with meals. 1 each 0    lisinopriL (PRINIVIL,ZESTRIL) 5 MG tablet Take 1 tablet (5 mg total) by mouth once daily. 90 tablet 3    pen needle, diabetic (BD ULTRA-FINE ANDRES PEN NEEDLE) 32 gauge x 5/32" Ndle Use with insulin pens to inject insulin under the skin 100 each 11    pulse oximeter (PULSE OXIMETER) device Use twice daily at 8 AM and 3 PM and record the value in [employer] as directed. 1 each 0    ZINC ORAL Take 1 tablet by mouth once daily.       No current facility-administered medications for this visit.     Review of patient's allergies indicates:  No Known Allergies    Review of Systems    Objective:      There were no vitals filed for this visit.  Physical Exam    Lab Review:   BMP:   Lab Results   Component Value Date     (H) 01/20/2022     (L) 01/20/2022    K 4.5 01/20/2022    CL 93 (L) 01/20/2022    CO2 31 (H) 01/20/2022    BUN 14 01/20/2022    CREATININE 0.8 01/20/2022    CALCIUM 10.1 01/20/2022     Diagnostics Review: Echo: Reviewed     Assessment:       1. Snoring    2. Sleep apnea, unspecified type    3. Hypersomnia    4. Type 2 diabetes mellitus with other specified complication, without long-term current use of insulin    5. Chronic bronchitis, unspecified chronic bronchitis type    6. Essential hypertension    7. Severe obesity (BMI >= 40)        Plan:       Due to listed symptoms, a polysomnogram is recommended and ordered.   Description of procedure given to patient.   If significant Obstructive Sleep Apnea (YANETH) is found during the initial portion of the study, therapy will be initiated with nasal Continuous Positive Airway Pressure (CPAP).   " Goals of therapy were discussed, alternative treatments listed and patient agrees to this form of therapy if indicated.   The pathophysiology of YANETH was discussed.   The effects of YANETH on patient's co-morbid conditions and the increased morbidity and/or mortality associated with this condition were reviewed.   The patient was given open opportunity to ask questions and/or express concerns about treatment plan.   All questions/concerns were discussed.   Driving precautions were provided.       Thank you for referring Nde Brock for evaluation.       The patient location is: home  The chief complaint leading to consultation is: disrupted sleep    Visit type: audio only    Face to Face time with patient: 16  32 minutes of total time spent on the encounter, which includes face to face time and non-face to face time preparing to see the patient (eg, review of tests), Obtaining and/or reviewing separately obtained history, Documenting clinical information in the electronic or other health record, Independently interpreting results (not separately reported) and communicating results to the patient/family/caregiver, or Care coordination (not separately reported).         Each patient to whom he or she provides medical services by telemedicine is:  (1) informed of the relationship between the physician and patient and the respective role of any other health care provider with respect to management of the patient; and (2) notified that he or she may decline to receive medical services by telemedicine and may withdraw from such care at any time.    Notes:

## 2022-01-27 NOTE — TELEPHONE ENCOUNTER
----- Message from Bryson Pascal sent at 1/27/2022 11:23 AM CST -----  Contact: Dr. Mccoy [phone]  Consult    She wants to discuss the patient's care/condition.    Thank you

## 2022-01-28 ENCOUNTER — TELEPHONE (OUTPATIENT)
Dept: PULMONOLOGY | Facility: CLINIC | Age: 62
End: 2022-01-28
Payer: COMMERCIAL

## 2022-01-28 NOTE — TELEPHONE ENCOUNTER
Attempted to contact patient in regards to his message but no one answered patient telephone and patient voicemail is to full to leave a message.

## 2022-01-28 NOTE — ASSESSMENT & PLAN NOTE
History of ongoing bronchitis and seasonal allergies.   Family history of ashtma  - starting flovent and continue flonase and PRN albuterol   - ongoing cough since covid infection  - cough medicine prescription sent.

## 2022-01-28 NOTE — ASSESSMENT & PLAN NOTE
Highly suspicious for YANETH - scheduled sleep evaluation pending.   - agree with sleep study/titration study

## 2022-01-28 NOTE — PROGRESS NOTES
"Subjective:   Patient ID:  Ned Brock is a 61 y.o. male    Reason for visit: chronic bronchitis, post covid    Mr. Brock is a 62 yo with HTN,DM, CABRALLO, and previous history of chronic bronchitis that presents for follow up after hospitalization for COVID pneumonia. He is accompanied by his wife that is assisting with history. Mr. Brock had "bronchitis and asthma" as a child but never had a true diagnosis and has never needed more than PRN albuterol. He was diagnosed with covid on Jan. 11 and subsequently became hypoxic and home and required admission. He never required NIV or ICU care and was discharged home with low flow oxygen. He feels much better but does have ongoing exhaustion and fatigue and dyspnea on exertion. He has not had any fever, chills, or nausea since discharge.        Review of Systems   Constitutional: Positive for malaise/fatigue. Negative for chills, fever and weight loss.   HENT: Negative for congestion, ear discharge, hearing loss and nosebleeds.    Eyes: Negative for blurred vision, photophobia and pain.   Respiratory: Positive for cough and shortness of breath.    Cardiovascular: Negative for chest pain and leg swelling.   Gastrointestinal: Negative for abdominal pain, constipation, diarrhea, heartburn and nausea.   Genitourinary: Negative for frequency and hematuria.   Musculoskeletal: Negative for myalgias and neck pain.   Skin: Negative for itching and rash.   Neurological: Negative for dizziness, focal weakness and headaches.   Endo/Heme/Allergies: Negative.    Psychiatric/Behavioral: Negative.       Objective:     Vitals:    01/27/22 1604   BP: 120/79   BP Location: Left arm   Patient Position: Sitting   Pulse: 110   SpO2: 95%  Comment: 2.0   Weight: 124.7 kg (274 lb 14.6 oz)   Height: 5' 10" (1.778 m)         Physical Exam  Vitals reviewed.   Constitutional:       General: He is not in acute distress.     Appearance: He is obese. He is not diaphoretic.   HENT:      Head: " Normocephalic and atraumatic.      Right Ear: External ear normal.      Left Ear: External ear normal.      Mouth/Throat:      Mouth: Oropharynx is clear and moist.   Eyes:      Extraocular Movements: EOM normal.      Conjunctiva/sclera: Conjunctivae normal.      Pupils: Pupils are equal, round, and reactive to light.   Neck:      Trachea: No tracheal deviation.   Cardiovascular:      Rate and Rhythm: Normal rate and regular rhythm.      Heart sounds: Normal heart sounds. No murmur heard.      Pulmonary:      Effort: Pulmonary effort is normal. No respiratory distress.      Breath sounds: No stridor. Rhonchi present. No wheezing or rales.      Comments: Low flow nasal canula in place  Abdominal:      General: Bowel sounds are normal. There is no distension.      Palpations: Abdomen is soft.      Tenderness: There is no abdominal tenderness.   Musculoskeletal:         General: No edema. Normal range of motion.      Cervical back: Normal range of motion and neck supple.   Skin:     General: Skin is warm and dry.      Findings: No erythema.   Neurological:      Mental Status: He is alert and oriented to person, place, and time.      GCS: GCS score is 15.      Gait: Gait is intact.   Psychiatric:         Mood and Affect: Mood and affect normal.         Cognition and Memory: Memory normal.         Judgment: Judgment normal.            Assessment:     1. Persistent cough  promethazine-codeine 6.25-10 mg/5 ml (PHENERGAN WITH CODEINE) 6.25-10 mg/5 mL syrup   2. Chronic bronchitis, unspecified chronic bronchitis type  Ambulatory referral/consult to Pulmonology    fluticasone propionate (FLOVENT HFA) 110 mcg/actuation inhaler    promethazine-codeine 6.25-10 mg/5 ml (PHENERGAN WITH CODEINE) 6.25-10 mg/5 mL syrup   3. Pneumonia due to COVID-19 virus     4. SOB (shortness of breath)     5. YANETH (obstructive sleep apnea)         Current Outpatient Medications   Medication Instructions    albuterol (ACCUNEB) 2.5 mg, Nebulization,  "Every 6 hours PRN, Rescue    ascorbic acid (VITAMIN C ORAL) 1 capsule, Oral, Daily    blood sugar diagnostic Strp Use as instructed to check blood sugar three times daily before meals and at bedtime (ICD-10-CM: E11.9)    blood sugar diagnostic Strp 1 strip, Misc.(Non-Drug; Combo Route), 2 times daily with meals    blood-glucose meter (FREESTYLE FREEDOM) kit Use as instructed    blood-glucose meter kit Use as instructed    dextromethorphan-guaiFENesin  mg/5 ml (ROBITUSSIN-DM)  mg/5 mL liquid 5 mLs, Oral, Every 6 hours    ergocalciferol, vitamin D2, (VITAMIN D ORAL) 1 capsule, Oral, Daily    flash glucose scanning reader (FREESTYLE JANETH 14 DAY READER) Misc IU    flash glucose sensor (FREESTYLE JANETH 14 DAY SENSOR) Kit IU    fluticasone propionate (FLONASE) 50 mcg/actuation nasal spray 1 spray, Each Nostril, Daily PRN    fluticasone propionate (FLOVENT HFA) 110 mcg/actuation inhaler 1 puff, Inhalation, 2 times daily, Controller    guaiFENesin (MUCINEX) 1,200 mg, Oral, 2 times daily PRN    insulin detemir U-100 (LEVEMIR FLEXTOUCH) 5 Units, Subcutaneous, Nightly    lancets 30 gauge Misc Use as instructed to check blood sugar three times daily before meals and at bedtime (ICD-10-CM: E11.9)    lancets Misc 1 lancet, Misc.(Non-Drug; Combo Route), 2 times daily with meals    lancing device Misc Use as instructed to check blood sugar three times daily before meals and at bedtime (ICD-10-CM: E11.9)    lancing device Misc 1 Device, Misc.(Non-Drug; Combo Route), 2 times daily with meals    lisinopriL (PRINIVIL,ZESTRIL) 5 mg, Oral, Daily    pen needle, diabetic (BD ULTRA-FINE ANDRES PEN NEEDLE) 32 gauge x 5/32" Ndle Use with insulin pens to inject insulin under the skin    promethazine-codeine 6.25-10 mg/5 ml (PHENERGAN WITH CODEINE) 6.25-10 mg/5 mL syrup 5 mLs, Oral, Every 6 hours PRN    pulse oximeter (PULSE OXIMETER) device Use twice daily at 8 AM and 3 PM and record the value in MyChart as " directed.    ZINC ORAL 1 tablet, Oral, Daily      Ned Brock had no medications administered during this visit.     No orders of the defined types were placed in this encounter.     Plan:       Chronic bronchitis  History of ongoing bronchitis and seasonal allergies.   Family history of ashtma  - starting flovent and continue flonase and PRN albuterol   - ongoing cough since covid infection  - cough medicine prescription sent.     Pneumonia due to COVID-19 virus  Improved but ongoing oxygen needs.   - continue supplemental oxygen as needed   - will plan repeat CT chest and PFTs in 3 months after his ongoing recovery.     SOB (shortness of breath)  See above     YANETH (obstructive sleep apnea)  Highly suspicious for YANETH - scheduled sleep evaluation pending.   - agree with sleep study/titration study       45 minutes of total time spent on the encounter, which includes face-to-face time and non-face-to-face time preparing to see the patient (eg, review of tests), obtaining and/or reviewing separately obtained history, documenting clinical information in the electronic or other health record, independently interpreting results (not separately reported), and communicating results to the patient/family/caregiver, or care coordination (not separately reported).     Portions of the record may have been created with voice-recognition software. Occasional wrong-word or sound-a-like substitutions may have occurred due to the inherent limitations of voice-recognition software. Read the chart carefully and recognize, using context, where substitutions have occurred.

## 2022-01-28 NOTE — TELEPHONE ENCOUNTER
----- Message from Magalie Rodriguez sent at 1/28/2022 10:51 AM CST -----  Regarding: Pt Call Back Request  PT Wife calling to speak with staff regarding medication     [phone] (home)

## 2022-01-28 NOTE — TELEPHONE ENCOUNTER
Left message on patient voicemail, informing him that I'm contacting him in regards to advising him that Dr Michel signed his RX for medication (Promethazine Cough Syrup). I also advised patient that he has to come in and  RX being that RX is a controlled medication. I advised patient that if he as any questions or concerns, he may contact the office. office number has been provided.

## 2022-01-28 NOTE — ASSESSMENT & PLAN NOTE
Improved but ongoing oxygen needs.   - continue supplemental oxygen as needed   - will plan repeat CT chest and PFTs in 3 months after his ongoing recovery.

## 2022-02-01 LAB
DLCO ADJ PRE: 25.22 ML/(MIN*MMHG) (ref 20.74–34.59)
DLCO SINGLE BREATH LLN: 20.74
DLCO SINGLE BREATH PRE REF: 90.4 %
DLCO SINGLE BREATH REF: 27.67
DLCOC SBVA LLN: 2.86
DLCOC SBVA PRE REF: 112.1 %
DLCOC SBVA REF: 4.12
DLCOC SINGLE BREATH LLN: 20.74
DLCOC SINGLE BREATH PRE REF: 91.2 %
DLCOC SINGLE BREATH REF: 27.67
DLCOCSBVAULN: 5.37
DLCOCSINGLEBREATHULN: 34.59
DLCOSINGLEBREATHULN: 34.59
DLCOVA LLN: 2.86
DLCOVA PRE REF: 111.1 %
DLCOVA PRE: 4.58 ML/(MIN*MMHG*L) (ref 2.86–5.37)
DLCOVA REF: 4.12
DLCOVAULN: 5.37
DLVAADJ PRE: 4.61 ML/(MIN*MMHG*L) (ref 2.86–5.37)
FEF 25 75 LLN: 1.44
FEF 25 75 PRE REF: 99.6 %
FEF 25 75 REF: 2.89
FEV05 LLN: 1.51
FEV05 REF: 2.64
FEV1 FVC LLN: 66
FEV1 FVC PRE REF: 103 %
FEV1 FVC REF: 78
FEV1 LLN: 2.57
FEV1 PRE REF: 85.1 %
FEV1 REF: 3.39
FVC LLN: 3.33
FVC PRE REF: 82.5 %
FVC REF: 4.36
IVC PRE: 3.48 L (ref 3.33–5.4)
IVC SINGLE BREATH LLN: 3.33
IVC SINGLE BREATH PRE REF: 79.9 %
IVC SINGLE BREATH REF: 4.36
IVCSINGLEBREATHULN: 5.4
PEF LLN: 6.66
PEF PRE REF: 61.3 %
PEF REF: 8.85
PHYSICIAN COMMENT: ABNORMAL
PRE DLCO: 25 ML/(MIN*MMHG) (ref 20.74–34.59)
PRE FEF 25 75: 2.88 L/S (ref 1.44–4.86)
PRE FET 100: 6.42 SEC
PRE FEV05 REF: 83.2 %
PRE FEV1 FVC: 80.32 % (ref 66.02–88.36)
PRE FEV1: 2.89 L (ref 2.57–4.17)
PRE FEV5: 2.2 L (ref 1.51–3.78)
PRE FVC: 3.6 L (ref 3.33–5.4)
PRE PEF: 5.42 L/S (ref 6.66–11.04)
VA PRE: 5.46 L (ref 6.57–6.57)
VA SINGLE BREATH PRE REF: 83.2 %
VA SINGLE BREATH REF: 6.57

## 2022-02-08 ENCOUNTER — TELEPHONE (OUTPATIENT)
Dept: PULMONOLOGY | Facility: CLINIC | Age: 62
End: 2022-02-08
Payer: COMMERCIAL

## 2022-02-08 NOTE — TELEPHONE ENCOUNTER
Spoke with patient wife  in regards  to [facility] Paperwork.  I provided  Pulmonary fax number to fax over any documents.  Patient verbalized she understands.

## 2022-02-08 NOTE — TELEPHONE ENCOUNTER
----- Message from Marisol Antonio sent at 2/8/2022 10:58 AM CST -----  Regarding: Pt. Advise  Contact: patient wife  Pt. Wife called in regards to [place]  paperwork.  Stated  pt. Needs documentation that oxygen is extended for 4 additional weeks,              Pt.@ [phone]

## 2022-02-11 ENCOUNTER — TELEPHONE (OUTPATIENT)
Dept: PULMONOLOGY | Facility: CLINIC | Age: 62
End: 2022-02-11
Payer: COMMERCIAL

## 2022-02-11 NOTE — TELEPHONE ENCOUNTER
----- Message from Shanon Villaseñor sent at 2/11/2022  9:18 AM CST -----  Contact: [employer]    [phone]  Calling to request an update on pt.  We sent over some paper work and we need pts new or current clinical notes.  Pt says Dr Michel wants him on oxygen for 6 weeks.  We will need documentation of this.  Pls fax to [phone].

## 2022-02-11 NOTE — TELEPHONE ENCOUNTER
Spoke with Ms.Martha in regards to Disability Paperwork.  I advised Ms.Martha  isnt in clinic this week.   stated that's fine but will need paperwork complete as soon possible.  I advised Ms.Martha I will forward this message to  to review and advise.  She verbalized she understands.

## 2022-02-15 ENCOUNTER — OFFICE VISIT (OUTPATIENT)
Dept: INTERNAL MEDICINE | Facility: CLINIC | Age: 62
End: 2022-02-15
Payer: COMMERCIAL

## 2022-02-15 ENCOUNTER — LAB VISIT (OUTPATIENT)
Dept: LAB | Facility: HOSPITAL | Age: 62
End: 2022-02-15
Attending: INTERNAL MEDICINE
Payer: COMMERCIAL

## 2022-02-15 VITALS
SYSTOLIC BLOOD PRESSURE: 138 MMHG | WEIGHT: 274.5 LBS | DIASTOLIC BLOOD PRESSURE: 88 MMHG | OXYGEN SATURATION: 99 % | HEART RATE: 88 BPM | HEIGHT: 70 IN | BODY MASS INDEX: 39.3 KG/M2

## 2022-02-15 DIAGNOSIS — R79.89 LFT ELEVATION: ICD-10-CM

## 2022-02-15 DIAGNOSIS — I10 ESSENTIAL HYPERTENSION: ICD-10-CM

## 2022-02-15 DIAGNOSIS — R19.7 ACUTE DIARRHEA: ICD-10-CM

## 2022-02-15 DIAGNOSIS — E11.9 TYPE 2 DIABETES MELLITUS WITHOUT COMPLICATION, WITHOUT LONG-TERM CURRENT USE OF INSULIN: Primary | ICD-10-CM

## 2022-02-15 LAB
ALBUMIN/CREAT UR: 10.4 UG/MG (ref 0–30)
CK SERPL-CCNC: 204 U/L (ref 20–200)
CREAT UR-MCNC: 77 MG/DL (ref 23–375)
FERRITIN SERPL-MCNC: 109 NG/ML (ref 20–300)
MICROALBUMIN UR DL<=1MG/L-MCNC: 8 UG/ML

## 2022-02-15 PROCEDURE — 4010F ACE/ARB THERAPY RXD/TAKEN: CPT | Mod: CPTII,S$GLB,, | Performed by: INTERNAL MEDICINE

## 2022-02-15 PROCEDURE — 3079F PR MOST RECENT DIASTOLIC BLOOD PRESSURE 80-89 MM HG: ICD-10-PCS | Mod: CPTII,S$GLB,, | Performed by: INTERNAL MEDICINE

## 2022-02-15 PROCEDURE — 1160F RVW MEDS BY RX/DR IN RCRD: CPT | Mod: CPTII,S$GLB,, | Performed by: INTERNAL MEDICINE

## 2022-02-15 PROCEDURE — 99999 PR PBB SHADOW E&M-EST. PATIENT-LVL V: ICD-10-PCS | Mod: PBBFAC,,, | Performed by: INTERNAL MEDICINE

## 2022-02-15 PROCEDURE — 86706 HEP B SURFACE ANTIBODY: CPT | Performed by: INTERNAL MEDICINE

## 2022-02-15 PROCEDURE — 82570 ASSAY OF URINE CREATININE: CPT | Performed by: INTERNAL MEDICINE

## 2022-02-15 PROCEDURE — 3008F PR BODY MASS INDEX (BMI) DOCUMENTED: ICD-10-PCS | Mod: CPTII,S$GLB,, | Performed by: INTERNAL MEDICINE

## 2022-02-15 PROCEDURE — 3075F SYST BP GE 130 - 139MM HG: CPT | Mod: CPTII,S$GLB,, | Performed by: INTERNAL MEDICINE

## 2022-02-15 PROCEDURE — 86235 NUCLEAR ANTIGEN ANTIBODY: CPT | Performed by: INTERNAL MEDICINE

## 2022-02-15 PROCEDURE — 1159F MED LIST DOCD IN RCRD: CPT | Mod: CPTII,S$GLB,, | Performed by: INTERNAL MEDICINE

## 2022-02-15 PROCEDURE — 3008F BODY MASS INDEX DOCD: CPT | Mod: CPTII,S$GLB,, | Performed by: INTERNAL MEDICINE

## 2022-02-15 PROCEDURE — 1160F PR REVIEW ALL MEDS BY PRESCRIBER/CLIN PHARMACIST DOCUMENTED: ICD-10-PCS | Mod: CPTII,S$GLB,, | Performed by: INTERNAL MEDICINE

## 2022-02-15 PROCEDURE — 4010F PR ACE/ARB THEARPY RXD/TAKEN: ICD-10-PCS | Mod: CPTII,S$GLB,, | Performed by: INTERNAL MEDICINE

## 2022-02-15 PROCEDURE — 99215 OFFICE O/P EST HI 40 MIN: CPT | Mod: S$GLB,,, | Performed by: INTERNAL MEDICINE

## 2022-02-15 PROCEDURE — 1159F PR MEDICATION LIST DOCUMENTED IN MEDICAL RECORD: ICD-10-PCS | Mod: CPTII,S$GLB,, | Performed by: INTERNAL MEDICINE

## 2022-02-15 PROCEDURE — 82550 ASSAY OF CK (CPK): CPT | Performed by: INTERNAL MEDICINE

## 2022-02-15 PROCEDURE — 86705 HEP B CORE ANTIBODY IGM: CPT | Performed by: INTERNAL MEDICINE

## 2022-02-15 PROCEDURE — 3046F HEMOGLOBIN A1C LEVEL >9.0%: CPT | Mod: CPTII,S$GLB,, | Performed by: INTERNAL MEDICINE

## 2022-02-15 PROCEDURE — 3046F PR MOST RECENT HEMOGLOBIN A1C LEVEL > 9.0%: ICD-10-PCS | Mod: CPTII,S$GLB,, | Performed by: INTERNAL MEDICINE

## 2022-02-15 PROCEDURE — 3075F PR MOST RECENT SYSTOLIC BLOOD PRESS GE 130-139MM HG: ICD-10-PCS | Mod: CPTII,S$GLB,, | Performed by: INTERNAL MEDICINE

## 2022-02-15 PROCEDURE — 82043 UR ALBUMIN QUANTITATIVE: CPT | Performed by: INTERNAL MEDICINE

## 2022-02-15 PROCEDURE — 86803 HEPATITIS C AB TEST: CPT | Performed by: INTERNAL MEDICINE

## 2022-02-15 PROCEDURE — 99215 PR OFFICE/OUTPT VISIT, EST, LEVL V, 40-54 MIN: ICD-10-PCS | Mod: S$GLB,,, | Performed by: INTERNAL MEDICINE

## 2022-02-15 PROCEDURE — 1111F PR DISCHARGE MEDS RECONCILED W/ CURRENT OUTPATIENT MED LIST: ICD-10-PCS | Mod: CPTII,S$GLB,, | Performed by: INTERNAL MEDICINE

## 2022-02-15 PROCEDURE — 86256 FLUORESCENT ANTIBODY TITER: CPT | Mod: 91 | Performed by: INTERNAL MEDICINE

## 2022-02-15 PROCEDURE — 36415 COLL VENOUS BLD VENIPUNCTURE: CPT | Performed by: INTERNAL MEDICINE

## 2022-02-15 PROCEDURE — 99999 PR PBB SHADOW E&M-EST. PATIENT-LVL V: CPT | Mod: PBBFAC,,, | Performed by: INTERNAL MEDICINE

## 2022-02-15 PROCEDURE — 86038 ANTINUCLEAR ANTIBODIES: CPT | Performed by: INTERNAL MEDICINE

## 2022-02-15 PROCEDURE — 3079F DIAST BP 80-89 MM HG: CPT | Mod: CPTII,S$GLB,, | Performed by: INTERNAL MEDICINE

## 2022-02-15 PROCEDURE — 1111F DSCHRG MED/CURRENT MED MERGE: CPT | Mod: CPTII,S$GLB,, | Performed by: INTERNAL MEDICINE

## 2022-02-15 PROCEDURE — 82728 ASSAY OF FERRITIN: CPT | Performed by: INTERNAL MEDICINE

## 2022-02-15 NOTE — PROGRESS NOTES
"    CHIEF COMPLAINT     Chief Complaint   Patient presents with    Follow-up     After having covid symptoms       HPI     Ned Brock is a 61 y.o. male type 2 diabetes fatty liver disease recent COVID-19 infection here today for diabetes follow-up.  Was diagnosed with diabetes after leaving the hospital.  Has been on insulin Levemir 5 units daily.  Going to start patient on was epic today.    Diarrhea x1 week reports high frequency watery diarrhea for the past week.  Is not associated with any particular foods her eating.  Symptoms seem to occur worse after he eats but still gets even when he does any.    Recent COVID infection  Patient still requiring home oxygen in reports low stamina.  Patient had referral for physical therapy at last visit that has not started yet.  Patient is complaining of weakness low stamina.    Was seen by Pulmonary on January 27th  He was started on Flovent Flonase  And albuterol has been on oxygen since that time sounds like plan is going to repeat      Personally Reviewed Patient's Medical, surgical, family and social hx. Changes updated in [facility].  Care Team updated in Epic    Review of Systems:  Review of Systems   Respiratory: Positive for cough and shortness of breath.    Neurological: Positive for weakness.       Health Maintenance:   Reviewed with patient  Due for the following:      PHYSICAL EXAM     /88   Pulse 88   Ht 5' 10" (1.778 m)   Wt 124.5 kg (274 lb 7.6 oz)   SpO2 99%   BMI 39.38 kg/m²     Gen: Well Appearing, NAD  HEENT: PERR, EOMI  Neck: FROM, no thyromegaly, no cervical adenopathy  CVD: RRR, no M/R/G  Pulm: Normal work of breathing, CTAB, no wheezing  Abd:  Soft,mild distention, minimal TTP  MSK: no LE edema  Neuro: A&Ox3, gait normal, speech normal  Mood; Mood normal, behavior normal, thought process linear       LABS     Labs reviewed;    Chemistry        Component Value Date/Time     (L) 01/20/2022 1600    K 4.5 01/20/2022 1600    CL 93 (L) " 01/20/2022 1600    CO2 31 (H) 01/20/2022 1600    BUN 14 01/20/2022 1600    CREATININE 0.8 01/20/2022 1600     (H) 01/20/2022 1600        Component Value Date/Time    CALCIUM 10.1 01/20/2022 1600    ALKPHOS 86 01/20/2022 1600    AST 73 (H) 01/20/2022 1600    ALT 95 (H) 01/20/2022 1600    BILITOT 0.4 01/20/2022 1600    ESTGFRAFRICA >60.0 01/20/2022 1600    EGFRNONAA >60.0 01/20/2022 1600        Lab Results   Component Value Date    HGBA1C 9.8 (H) 01/13/2022     No results found for: LDLCALC      ASSESSMENT     1. Type 2 diabetes mellitus without complication, without long-term current use of insulin  MICROALBUMIN / CREATININE RATIO URINE    semaglutide (OZEMPIC) 0.25 mg or 0.5 mg(2 mg/1.5 mL) pen injector   2. Essential hypertension     3. Acute diarrhea  Occult blood x 1, stool    WBC, Stool    Stool culture    Giardia / Cryptosporidum, EIA    Stool Exam-Ova,Cysts,Parasites    Clostridium difficile EIA   4. LFT elevation  DARON Screen w/Reflex    Anti-Smooth Muscle Antibody    CK    Ferritin    Hepatitis C Antibody    Hepatitis B Core Antibody, IgM    Hepatitis B Surface Ab, Qualitative    Antimitochondrial Antibody    US Abdomen Complete           Plan     Ned Brock is a 61 y.o. male with recent COVID-19 infection still with debility and oxygen requirement.  Due type 2 diabetes, hypertension and CARBALLO    .  1. Type 2 diabetes mellitus without complication, without long-term current use of insulin  Needs to be scheduled for diabetes education  Will switch from Levemir units to his epic  Choosing does not but due to comorbid conditions of would obesity as well as carballo  Will start statin after getting lipids  - MICROALBUMIN / CREATININE RATIO URINE  - semaglutide (OZEMPIC) 0.25 mg or 0.5 mg(2 mg/1.5 mL) pen injector; Inject 0.25 mg into the skin every 7 days for 30 days, THEN 0.5 mg every 7 days.  Dispense: 2 pen; Refill: 0    2. Essential hypertension  Initially elevated but improved with repeat.  Continue  lisinopril 5.     3. Acute diarrhea  Will do home stool collection  - Occult blood x 1, stool; Future  - WBC, Stool; Future  - Stool culture; Future  - Giardia / Cryptosporidum, EIA; Future  - Stool Exam-Ova,Cysts,Parasites; Future  - Clostridium difficile EIA; Future    4. LFT elevation  Suspected CARBALLO however has not had previous secondary workup  - DARON Screen w/Reflex; Future  - Anti-Smooth Muscle Antibody; Future  - CK; Future  - Ferritin; Future  - Hepatitis C Antibody; Future  - Hepatitis B Core Antibody, IgM; Future  - Hepatitis B Surface Ab, Qualitative; Future  - Antimitochondrial Antibody; Future  - US Abdomen Complete; Future    Follow up me in 8 weeks    Greater than 40 minutes Professional Services  Donaldo Willis MD

## 2022-02-16 ENCOUNTER — TELEPHONE (OUTPATIENT)
Dept: INTERNAL MEDICINE | Facility: CLINIC | Age: 62
End: 2022-02-16
Payer: COMMERCIAL

## 2022-02-16 ENCOUNTER — LAB VISIT (OUTPATIENT)
Dept: LAB | Facility: HOSPITAL | Age: 62
End: 2022-02-16
Attending: INTERNAL MEDICINE
Payer: COMMERCIAL

## 2022-02-16 ENCOUNTER — TELEPHONE (OUTPATIENT)
Dept: PULMONOLOGY | Facility: CLINIC | Age: 62
End: 2022-02-16
Payer: COMMERCIAL

## 2022-02-16 DIAGNOSIS — R19.7 ACUTE DIARRHEA: ICD-10-CM

## 2022-02-16 LAB
ANA SER QL IF: NORMAL
MITOCHONDRIA AB TITR SER IF: NORMAL {TITER}
OB PNL STL: NEGATIVE
SMOOTH MUSCLE AB TITR SER IF: NORMAL {TITER}
WBC #/AREA STL HPF: NORMAL /[HPF]

## 2022-02-16 PROCEDURE — 87329 GIARDIA AG IA: CPT | Performed by: INTERNAL MEDICINE

## 2022-02-16 PROCEDURE — 82272 OCCULT BLD FECES 1-3 TESTS: CPT | Performed by: INTERNAL MEDICINE

## 2022-02-16 PROCEDURE — 87186 SC STD MICRODIL/AGAR DIL: CPT | Performed by: INTERNAL MEDICINE

## 2022-02-16 PROCEDURE — 87209 SMEAR COMPLEX STAIN: CPT | Performed by: INTERNAL MEDICINE

## 2022-02-16 PROCEDURE — 87177 OVA AND PARASITES SMEARS: CPT | Performed by: INTERNAL MEDICINE

## 2022-02-16 PROCEDURE — 87077 CULTURE AEROBIC IDENTIFY: CPT | Performed by: INTERNAL MEDICINE

## 2022-02-16 PROCEDURE — 87427 SHIGA-LIKE TOXIN AG IA: CPT | Mod: 59 | Performed by: INTERNAL MEDICINE

## 2022-02-16 PROCEDURE — 89055 LEUKOCYTE ASSESSMENT FECAL: CPT | Performed by: INTERNAL MEDICINE

## 2022-02-16 PROCEDURE — 87045 FECES CULTURE AEROBIC BACT: CPT | Performed by: INTERNAL MEDICINE

## 2022-02-16 PROCEDURE — 87046 STOOL CULTR AEROBIC BACT EA: CPT | Mod: 59 | Performed by: INTERNAL MEDICINE

## 2022-02-16 NOTE — TELEPHONE ENCOUNTER
----- Message from Karen Barton sent at 2/16/2022  3:01 PM CST -----  Contact: [phone]  [place] with lab calling for the pt he is needing to speak to a nurse in regards to cancel the CDIFF because the specimen is formed instead of liquid

## 2022-02-16 NOTE — TELEPHONE ENCOUNTER
Spoke with Ms.Haley Solorzano with [employer] in  regards to  FMLA Form  I advised  once the FMLA from is signed and completed by  I will fax it back.   verbalized she understands.

## 2022-02-16 NOTE — TELEPHONE ENCOUNTER
----- Message from Christian Padron sent at 2/16/2022  8:47 AM CST -----  Regarding: Martha Solorzano with [employer]      The caller states that she will need for Dr. Michel to fill out the form she faxed you on 2/07/2022.    Please contact the caller if you have any questions    Ph # [phone] - Martha Solorzano

## 2022-02-16 NOTE — TELEPHONE ENCOUNTER
Tried to reach [place] at [phone] with the Micro department and was informed the department was closed for the day.

## 2022-02-17 LAB
CRYPTOSP AG STL QL IA: NEGATIVE
E COLI SXT1 STL QL IA: NEGATIVE
E COLI SXT2 STL QL IA: NEGATIVE
G LAMBLIA AG STL QL IA: NEGATIVE
HBV CORE IGM SERPL QL IA: NEGATIVE
HCV AB SERPL QL IA: NEGATIVE

## 2022-02-19 LAB
BACTERIA STL CULT: NORMAL
O+P STL MICRO: NORMAL

## 2022-02-21 ENCOUNTER — TELEPHONE (OUTPATIENT)
Dept: PULMONOLOGY | Facility: CLINIC | Age: 62
End: 2022-02-21
Payer: COMMERCIAL

## 2022-02-21 NOTE — TELEPHONE ENCOUNTER
----- Message from Tracie Amezcua sent at 2/21/2022  4:03 PM CST -----  Regarding: speak with nurse  Contact: patient  [phone]   please call patient said his job needs disability paper work filled out saying he is on oxygen not working right now will not get paid per patient need to speak with the nurse ASAP thanks.

## 2022-02-21 NOTE — TELEPHONE ENCOUNTER
Spoke with patient  in regards to progress notes from pervious clinic visit.  I advised   completed supporting documents and fax over all paperwork to Ms.Haley [employer].  Patient verbalized he understands.

## 2022-02-22 ENCOUNTER — HOSPITAL ENCOUNTER (OUTPATIENT)
Dept: RADIOLOGY | Facility: HOSPITAL | Age: 62
Discharge: HOME | End: 2022-02-22
Attending: INTERNAL MEDICINE
Payer: COMMERCIAL

## 2022-02-22 DIAGNOSIS — R79.89 LFT ELEVATION: ICD-10-CM

## 2022-02-22 PROCEDURE — 76705 ECHO EXAM OF ABDOMEN: CPT | Mod: TC

## 2022-02-22 PROCEDURE — 76705 US ABDOMEN LIMITED_LIVER: ICD-10-PCS | Mod: 26,,, | Performed by: RADIOLOGY

## 2022-02-22 PROCEDURE — 76705 ECHO EXAM OF ABDOMEN: CPT | Mod: 26,,, | Performed by: RADIOLOGY

## 2022-02-23 LAB — HBV SURFACE AB SER-ACNC: NEGATIVE M[IU]/ML

## 2022-03-02 ENCOUNTER — PATIENT MESSAGE (OUTPATIENT)
Dept: SLEEP MEDICINE | Facility: CLINIC | Age: 62
End: 2022-03-02
Payer: COMMERCIAL

## 2022-03-02 DIAGNOSIS — G47.33 OSA (OBSTRUCTIVE SLEEP APNEA): Primary | ICD-10-CM

## 2022-03-08 ENCOUNTER — CLINICAL SUPPORT (OUTPATIENT)
Dept: DIABETES | Facility: CLINIC | Age: 62
End: 2022-03-08
Payer: COMMERCIAL

## 2022-03-08 VITALS — BODY MASS INDEX: 39.46 KG/M2 | WEIGHT: 275 LBS

## 2022-03-08 DIAGNOSIS — E11.9 TYPE 2 DIABETES MELLITUS WITHOUT COMPLICATION, WITHOUT LONG-TERM CURRENT USE OF INSULIN: ICD-10-CM

## 2022-03-08 PROCEDURE — G0108 PR DIAB MANAGE TRN  PER INDIV: ICD-10-PCS | Mod: S$GLB,,, | Performed by: DIETITIAN, REGISTERED

## 2022-03-08 PROCEDURE — 99999 PR PBB SHADOW E&M-EST. PATIENT-LVL II: CPT | Mod: PBBFAC,,, | Performed by: DIETITIAN, REGISTERED

## 2022-03-08 PROCEDURE — G0108 DIAB MANAGE TRN  PER INDIV: HCPCS | Mod: S$GLB,,, | Performed by: DIETITIAN, REGISTERED

## 2022-03-08 PROCEDURE — 99999 PR PBB SHADOW E&M-EST. PATIENT-LVL II: ICD-10-PCS | Mod: PBBFAC,,, | Performed by: DIETITIAN, REGISTERED

## 2022-03-08 NOTE — PROGRESS NOTES
Diabetes Care Specialist Progress Note  Author: Gail Briseno RD  Date: 3/8/2022    Program Intake  Reason for Diabetes Program Visit:: Initial Diabetes Assessment  Current diabetes risk level:: moderate  In the last 12 months, have you:: used emergency room services  Was the ER or hospital admission related to diabetes?: No  Permission to speak with others about care:: yes    Lab Results   Component Value Date    HGBA1C 9.8 (H) 01/13/2022     Clinical    Problem Review  Reviewed Problem List with Patient: yes    Clinical Assessment  Current Diabetes Treatment: Insulin  Have you ever experienced hypoglycemia (low blood sugar)?: no  Have you ever experienced hyperglycemia (high blood sugar)?: yes    Medication Information  Medication adherence impacting ability to self-manage diabetes?: No    Labs  Lab Compliance Barriers: No    Nutritional Status  Diet: Regular, Diabetic diet  Meal Plan 24 Hour Recall: Breakfast, Lunch, Dinner, Snack (Major change in Food choices in last month)  Meal Plan 24 Hour Recall - Breakfast: 1-2 boilde eggs, turkey malloy, berries, coffee  Meal Plan 24 Hour Recall - Lunch: grilled tuna place in low sugar wrap w onions and spinach, sf jello  Meal Plan 24 Hour Recall - Dinner: similar to lunch  Meal Plan 24 Hour Recall - Snack: fruit (in past LOTS of desserts, sweets)  Change in appetite?: No  Dentation:: Intact  Recent Changes in Weight: No Recent Weight Change  Current nutritional status an area of need that is impacting patient's ability to self-manage diabetes?: No    Additional Social History    Support  Does anyone support you with your diabetes care?: yes  Who takes you to your medical appointments?: self  Does the current support meet the patient's needs?: Yes  Is Support an area impacting ability to self-manage diabetes?: No    Access to Mass Media & Technology  Does the patient have access to any of the following devices or technologies?: Smart phone  Media or technology needs  impacting ability to self-manage diabetes?: No    Cognitive/Behavioral Health  Alert and Oriented: Yes  Difficulty Thinking: No  Requires Prompting: No  Requires assistance for routine expression?: No  Cognitive or behavioral barriers impacting ability to self-manage diabetes?: No    Communication  Language preference: English  Hearing Problems: No  Vision Problems: No  Communication needs impacting ability to self-manage diabetes?: No    Health Literacy  Preferred Learning Method: Face to Face, Demonstration  How often do you need to have someone help you read instructions, pamphlets, or written material from your doctor or pharmacy?: Never  Health literacy needs impacting ability to self-manage diabetes?: No      Diabetes Self-Management Skills Assessment    Diabetes Disease Process/Treatment Options  Patient/caregiver able to state what happens when someone has diabetes.: somewhat  Patient/caregiver knows what type of diabetes they have.: yes  Diabetes Disease Process/Treatment Options: Skills Assessment Completed: Yes  Assessment indicates:: Adequate understanding  Area of need?: No    Nutrition/Healthy Eating  Challenges to healthy eating:: portion control  Method of carbohydrate measurement:: eyeballing/guessing  Patient can identify foods that impact blood sugar.: no (see comments)  Nutrition/Healthy Eating Skills Assessment Completed:: Yes  Assessment indicates:: Knowledge deficit, Instruction Needed  Area of need?: Yes    Physical Activity/Exercise  Patient's daily activity level:: lightly active  Patient formally exercises outside of work.: no  Reasons for not exercising:: other (see comments)  Physical Activity/Exercise Skills Assessment Completed: : Yes  Assessment indicates:: Adequate understanding, Knowledge deficit  Area of need?: Yes    Medications  Patient is able to describe current diabetes management routine.: yes  Diabetes management routine:: insulin, diet  Patient understands the purpose of  the medications taken for diabetes.: yes  Patient reports problems or concerns with current medication regimen.: no  Medication Skills Assessment Completed:: Yes  Assessment indicates:: Adequate understanding  Area of need?: No    Home Blood Glucose Monitoring  Patient states that blood sugar is checked at home daily.: yes  Monitoring Method:: personal continuous glucose monitor  Personal CGM type:: Lbre2  Patient is able to use personal CGM appropriately.: yes  CGM Report reviewed?: no  Unable to download personal CGM: pt did not bring reader to appt for upload  Home Blood Glucose Monitoring Skills Assessment Completed: : Yes  Assessment indicates:: Adequate understanding  Area of need?: No    Acute Complications  Acute Complications Skills Assessment Completed: : Yes  Assessment indicates:: Adequate understanding  Area of need?: No    Chronic Complications  Chronic Complications Skills Assessment Completed: : Yes  Assessment indicates:: Adequate understanding  Area of need?: No    Psychosocial/Coping  Psychosocial/Coping Skills Assessment Completed: : No  Deffered due to:: Time      Diabetes Self Support Plan         Assessment Summary and Plan    Based on today's diabetes care assessment, the following areas of need were identified:      Diabetes Self-Management Skills 3/8/2022   Diabetes Disease Process/Treatment Options No   Nutrition/Healthy Eating Yes- drastic change in diet but does not understand carb counting; see care paln   Physical Activity/Exercise Yes- limited due to bronchitis but increasing gradually   Medication No- pt not on ozempic due to cost thus discussed how to use basal insulin; see care plan   Home Blood Glucose Monitoring No   Acute Complications No   Chronic Complications No      Today's interventions were provided through individual discussion, instruction, and written materials were provided.    Patient verbalized understanding of instruction and written materials.  Pt was able to  return back demonstration of instructions today. Patient understood key points, needs reinforcement and further instruction.     Diabetes Self-Management Care Plan:    Today's Diabetes Self-Management Care Plan was developed with Ned's input. Ned has agreed to work toward the following goal(s) to improve his/her overall diabetes control.      Care Plan: Diabetes Management   Updates made since 2/6/2022 12:00 AM      Problem: Healthy Eating       Goal: To achieve long-term weight loss and improved BG (less than 130, pt agrees to eat 3 evenly spaced meals/day containing 45-60 g carb/3-4 servings of carb/each meal. Snacks limited to 0-15 g carb each.    Start Date: 3/8/2022   Priority: High   Barriers: Knowledge deficit   Note:    Pt states he has dramatically changed diet and has removed many of his favorite carb foods from his diet (ffries, po-boys, desserts, candy, reg Soda, starches ). He reports his weight has not dropped but does feel diet is OK.  Diet recall notes pt is limited carbs in some meals but adding it back in the form of smoothies, extra fruit, alternate gains like oat and soy but not reading label to check for carb amount. 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 Also noted need to cut back on salt    -Discussed carb vs non-carb foods, appropriate amount of carbs to have at meals/snacks and acceptable serving sizes of individual carb items.  - Instructed on CONSISTENT CARB INTAKE with appropriate label reading and serving sizes of specific carb containing foods., portion control (hand) and using the plate method of meal planning.   -Encouraged decreasing portion sizes of CHO to 3-4 servings (45-60g)  per meal, 3 evenly spaced meals daily and limiting snacks to mostly 0-5g CHO. If does have CHO snack occasionally, no more than 15g  -Encouraged carb sources primarily from whole grains, fresh/frozen fruits, and low-fat milk and yogurt (count carbs in almond or oat milk).  "  -Discussed meal plans and snack ideas amenable to pt.  Discussed carb counting apps and using internet for carb info when eating out  Emphasized importance of eliminating all SSB. Discussed SF drink options.   Reviewed need to limit total/saturated fats and high sodium foods in daily diet.     Task: Provided visual examples using dry measuring cups, food models, and other familiar objects such as computer mouse, deck or cards, tennis ball etc. to help with visualization of portions. Completed 3/8/2022      Task: Explained how to count carbohydrates using the food label and the use of dry measuring cups for accurate carb counting. Completed 3/8/2022      Task: Discussed strategies for choosing healthier menu options when dining out. Completed 3/8/2022      Task: Recommended replacing beverages containing high sugar content with noncaloric/sugar free options and/or water. Completed 3/8/2022      Task: Review the importance of balancing carbohydrates with each meal using portion control techniques to count servings of carbohydrate and label reading to identify serving size and amount of total carbs per serving. Completed 3/8/2022      Problem: Physical Activity and Exercise       Goal: Patient agrees to increase physical activity to a goal of 3-5 times per week for 30+minutes/session.    Start Date: 3/8/2022   Priority: Medium   Barriers: No Barriers Identified   Note:    Reviewed difference between active lifestyle and structured physical activity and role exercise plays in weight loss. Discussed benefits of physical activity on BG control and encouraged pt to start a walking program for a total of 150 minutes per week while keeping 3 exercise components in mind: Frequency- 3-5x/wk, Duration- 30 min, Intensity- can say name but "not sing a song"     Task: Discussed role of physical activity as it relates to weight loss Completed 3/8/2022      Task: Offered suggestions on how patient could increase their regular " physical activity Completed 3/8/2022          Follow Up Plan     No follow-ups on file.    Today's care plan and follow up schedule was discussed with patient.  Ned verbalized understanding of the care plan, goals, and agrees to follow up plan.        The patient was encouraged to communicate with his/her health care provider/physician and care team regarding his/her condition(s) and treatment.  I provided the patient with my contact information today and encouraged to contact me via phone or [facility] Patient Portal as needed.     Length of Visit   Total Time: 60 Minutes

## 2022-03-16 ENCOUNTER — PATIENT MESSAGE (OUTPATIENT)
Dept: ADMINISTRATIVE | Facility: HOSPITAL | Age: 62
End: 2022-03-16
Payer: COMMERCIAL

## 2022-03-25 ENCOUNTER — PATIENT OUTREACH (OUTPATIENT)
Dept: ADMINISTRATIVE | Facility: OTHER | Age: 62
End: 2022-03-25
Payer: COMMERCIAL

## 2022-03-25 NOTE — PROGRESS NOTES
Health Maintenance Due   Topic Date Due    Lipid Panel  Never done    Pneumococcal Vaccines (Age 0-64) (1 of 2 - PPSV23) Never done    HIV Screening  Never done    Low Dose Statin  Never done    Colorectal Cancer Screening  Never done    Shingles Vaccine (1 of 2) Never done     Updates were requested from care everywhere.  Chart was reviewed for overdue Proactive [facility] Encounters (TABITHA) topics (CRS, Breast Cancer Screening, Eye exam)  Health Maintenance has been updated.  LINKS immunization registry triggered.  Immunizations were reconciled.

## 2022-03-28 ENCOUNTER — HOSPITAL ENCOUNTER (OUTPATIENT)
Dept: PULMONOLOGY | Facility: CLINIC | Age: 62
Discharge: HOME | End: 2022-03-28
Payer: COMMERCIAL

## 2022-03-28 ENCOUNTER — OFFICE VISIT (OUTPATIENT)
Dept: PULMONOLOGY | Facility: CLINIC | Age: 62
End: 2022-03-28
Payer: COMMERCIAL

## 2022-03-28 ENCOUNTER — PATIENT MESSAGE (OUTPATIENT)
Dept: ADMINISTRATIVE | Facility: HOSPITAL | Age: 62
End: 2022-03-28
Payer: COMMERCIAL

## 2022-03-28 ENCOUNTER — LAB VISIT (OUTPATIENT)
Dept: LAB | Facility: HOSPITAL | Age: 62
End: 2022-03-28
Attending: INTERNAL MEDICINE
Payer: COMMERCIAL

## 2022-03-28 VITALS
BODY MASS INDEX: 39.51 KG/M2 | HEIGHT: 70 IN | OXYGEN SATURATION: 96 % | HEART RATE: 88 BPM | SYSTOLIC BLOOD PRESSURE: 160 MMHG | DIASTOLIC BLOOD PRESSURE: 78 MMHG | WEIGHT: 276 LBS

## 2022-03-28 VITALS — BODY MASS INDEX: 39.51 KG/M2 | HEIGHT: 70 IN | WEIGHT: 276 LBS

## 2022-03-28 DIAGNOSIS — U07.1 PNEUMONIA DUE TO COVID-19 VIRUS: ICD-10-CM

## 2022-03-28 DIAGNOSIS — R06.02 SHORTNESS OF BREATH: ICD-10-CM

## 2022-03-28 DIAGNOSIS — E66.01 SEVERE OBESITY (BMI >= 40): ICD-10-CM

## 2022-03-28 DIAGNOSIS — M79.10 MYALGIA: ICD-10-CM

## 2022-03-28 DIAGNOSIS — J12.82 PNEUMONIA DUE TO COVID-19 VIRUS: ICD-10-CM

## 2022-03-28 DIAGNOSIS — R09.02 HYPOXIA: ICD-10-CM

## 2022-03-28 DIAGNOSIS — R05.9 COUGH: ICD-10-CM

## 2022-03-28 DIAGNOSIS — M25.50 ARTHRALGIA, UNSPECIFIED JOINT: ICD-10-CM

## 2022-03-28 DIAGNOSIS — E11.69 TYPE 2 DIABETES MELLITUS WITH OTHER SPECIFIED COMPLICATION, WITHOUT LONG-TERM CURRENT USE OF INSULIN: Primary | ICD-10-CM

## 2022-03-28 DIAGNOSIS — G47.33 OSA (OBSTRUCTIVE SLEEP APNEA): ICD-10-CM

## 2022-03-28 DIAGNOSIS — J42 CHRONIC BRONCHITIS, UNSPECIFIED CHRONIC BRONCHITIS TYPE: Chronic | ICD-10-CM

## 2022-03-28 DIAGNOSIS — E11.69 TYPE 2 DIABETES MELLITUS WITH OTHER SPECIFIED COMPLICATION, WITHOUT LONG-TERM CURRENT USE OF INSULIN: ICD-10-CM

## 2022-03-28 LAB
ALBUMIN SERPL BCP-MCNC: 4 G/DL (ref 3.5–5.2)
ALP SERPL-CCNC: 76 U/L (ref 55–135)
ALT SERPL W/O P-5'-P-CCNC: 42 U/L (ref 10–44)
ANION GAP SERPL CALC-SCNC: 9 MMOL/L (ref 8–16)
AST SERPL-CCNC: 27 U/L (ref 10–40)
BASOPHILS # BLD AUTO: 0.05 K/UL (ref 0–0.2)
BASOPHILS NFR BLD: 0.7 % (ref 0–1.9)
BILIRUB SERPL-MCNC: 0.5 MG/DL (ref 0.1–1)
BUN SERPL-MCNC: 17 MG/DL (ref 6–20)
CALCIUM SERPL-MCNC: 10.2 MG/DL (ref 8.7–10.5)
CCP AB SER IA-ACNC: 0.5 U/ML
CHLORIDE SERPL-SCNC: 96 MMOL/L (ref 95–110)
CK SERPL-CCNC: 411 U/L (ref 20–200)
CO2 SERPL-SCNC: 32 MMOL/L (ref 23–29)
CREAT SERPL-MCNC: 0.8 MG/DL (ref 0.5–1.4)
DIFFERENTIAL METHOD: NORMAL
EOSINOPHIL # BLD AUTO: 0.3 K/UL (ref 0–0.5)
EOSINOPHIL NFR BLD: 4.1 % (ref 0–8)
ERYTHROCYTE [DISTWIDTH] IN BLOOD BY AUTOMATED COUNT: 13.1 % (ref 11.5–14.5)
ERYTHROCYTE [SEDIMENTATION RATE] IN BLOOD BY WESTERGREN METHOD: 54 MM/HR (ref 0–23)
EST. GFR  (AFRICAN AMERICAN): >60 ML/MIN/1.73 M^2
EST. GFR  (NON AFRICAN AMERICAN): >60 ML/MIN/1.73 M^2
GLUCOSE SERPL-MCNC: 137 MG/DL (ref 70–110)
HCT VFR BLD AUTO: 44.5 % (ref 40–54)
HGB BLD-MCNC: 14.4 G/DL (ref 14–18)
IMM GRANULOCYTES # BLD AUTO: 0.02 K/UL (ref 0–0.04)
IMM GRANULOCYTES NFR BLD AUTO: 0.3 % (ref 0–0.5)
LYMPHOCYTES # BLD AUTO: 2 K/UL (ref 1–4.8)
LYMPHOCYTES NFR BLD: 27 % (ref 18–48)
MCH RBC QN AUTO: 28 PG (ref 27–31)
MCHC RBC AUTO-ENTMCNC: 32.4 G/DL (ref 32–36)
MCV RBC AUTO: 86 FL (ref 82–98)
MONOCYTES # BLD AUTO: 0.6 K/UL (ref 0.3–1)
MONOCYTES NFR BLD: 7.6 % (ref 4–15)
NEUTROPHILS # BLD AUTO: 4.5 K/UL (ref 1.8–7.7)
NEUTROPHILS NFR BLD: 60.3 % (ref 38–73)
NRBC BLD-RTO: 0 /100 WBC
PLATELET # BLD AUTO: 289 K/UL (ref 150–450)
PMV BLD AUTO: 9.7 FL (ref 9.2–12.9)
POTASSIUM SERPL-SCNC: 4 MMOL/L (ref 3.5–5.1)
PROT SERPL-MCNC: 7.6 G/DL (ref 6–8.4)
RBC # BLD AUTO: 5.15 M/UL (ref 4.6–6.2)
RHEUMATOID FACT SERPL-ACNC: <13 IU/ML (ref 0–15)
SODIUM SERPL-SCNC: 137 MMOL/L (ref 136–145)
WBC # BLD AUTO: 7.37 K/UL (ref 3.9–12.7)

## 2022-03-28 PROCEDURE — 94618 PULMONARY STRESS TESTING: ICD-10-PCS | Mod: S$GLB,,, | Performed by: INTERNAL MEDICINE

## 2022-03-28 PROCEDURE — 85025 COMPLETE CBC W/AUTO DIFF WBC: CPT | Performed by: INTERNAL MEDICINE

## 2022-03-28 PROCEDURE — 86038 ANTINUCLEAR ANTIBODIES: CPT | Performed by: INTERNAL MEDICINE

## 2022-03-28 PROCEDURE — 1159F PR MEDICATION LIST DOCUMENTED IN MEDICAL RECORD: ICD-10-PCS | Mod: CPTII,S$GLB,, | Performed by: INTERNAL MEDICINE

## 2022-03-28 PROCEDURE — 3078F DIAST BP <80 MM HG: CPT | Mod: CPTII,S$GLB,, | Performed by: INTERNAL MEDICINE

## 2022-03-28 PROCEDURE — 99999 PR PBB SHADOW E&M-EST. PATIENT-LVL IV: ICD-10-PCS | Mod: PBBFAC,,, | Performed by: INTERNAL MEDICINE

## 2022-03-28 PROCEDURE — 99999 PR PBB SHADOW E&M-EST. PATIENT-LVL IV: CPT | Mod: PBBFAC,,, | Performed by: INTERNAL MEDICINE

## 2022-03-28 PROCEDURE — 3061F PR NEG MICROALBUMINURIA RESULT DOCUMENTED/REVIEW: ICD-10-PCS | Mod: CPTII,S$GLB,, | Performed by: INTERNAL MEDICINE

## 2022-03-28 PROCEDURE — 3066F PR DOCUMENTATION OF TREATMENT FOR NEPHROPATHY: ICD-10-PCS | Mod: CPTII,S$GLB,, | Performed by: INTERNAL MEDICINE

## 2022-03-28 PROCEDURE — 3061F NEG MICROALBUMINURIA REV: CPT | Mod: CPTII,S$GLB,, | Performed by: INTERNAL MEDICINE

## 2022-03-28 PROCEDURE — 3078F PR MOST RECENT DIASTOLIC BLOOD PRESSURE < 80 MM HG: ICD-10-PCS | Mod: CPTII,S$GLB,, | Performed by: INTERNAL MEDICINE

## 2022-03-28 PROCEDURE — 36415 COLL VENOUS BLD VENIPUNCTURE: CPT | Performed by: INTERNAL MEDICINE

## 2022-03-28 PROCEDURE — 3066F NEPHROPATHY DOC TX: CPT | Mod: CPTII,S$GLB,, | Performed by: INTERNAL MEDICINE

## 2022-03-28 PROCEDURE — 4010F PR ACE/ARB THEARPY RXD/TAKEN: ICD-10-PCS | Mod: CPTII,S$GLB,, | Performed by: INTERNAL MEDICINE

## 2022-03-28 PROCEDURE — 85652 RBC SED RATE AUTOMATED: CPT | Performed by: INTERNAL MEDICINE

## 2022-03-28 PROCEDURE — 3077F SYST BP >= 140 MM HG: CPT | Mod: CPTII,S$GLB,, | Performed by: INTERNAL MEDICINE

## 2022-03-28 PROCEDURE — 3077F PR MOST RECENT SYSTOLIC BLOOD PRESSURE >= 140 MM HG: ICD-10-PCS | Mod: CPTII,S$GLB,, | Performed by: INTERNAL MEDICINE

## 2022-03-28 PROCEDURE — 86431 RHEUMATOID FACTOR QUANT: CPT | Performed by: INTERNAL MEDICINE

## 2022-03-28 PROCEDURE — 94618 PULMONARY STRESS TESTING: CPT | Mod: S$GLB,,, | Performed by: INTERNAL MEDICINE

## 2022-03-28 PROCEDURE — 3046F PR MOST RECENT HEMOGLOBIN A1C LEVEL > 9.0%: ICD-10-PCS | Mod: CPTII,S$GLB,, | Performed by: INTERNAL MEDICINE

## 2022-03-28 PROCEDURE — 3046F HEMOGLOBIN A1C LEVEL >9.0%: CPT | Mod: CPTII,S$GLB,, | Performed by: INTERNAL MEDICINE

## 2022-03-28 PROCEDURE — 3008F BODY MASS INDEX DOCD: CPT | Mod: CPTII,S$GLB,, | Performed by: INTERNAL MEDICINE

## 2022-03-28 PROCEDURE — 99214 PR OFFICE/OUTPT VISIT, EST, LEVL IV, 30-39 MIN: ICD-10-PCS | Mod: S$GLB,,, | Performed by: INTERNAL MEDICINE

## 2022-03-28 PROCEDURE — 82550 ASSAY OF CK (CPK): CPT | Performed by: INTERNAL MEDICINE

## 2022-03-28 PROCEDURE — 1159F MED LIST DOCD IN RCRD: CPT | Mod: CPTII,S$GLB,, | Performed by: INTERNAL MEDICINE

## 2022-03-28 PROCEDURE — 80053 COMPREHEN METABOLIC PANEL: CPT | Performed by: INTERNAL MEDICINE

## 2022-03-28 PROCEDURE — 4010F ACE/ARB THERAPY RXD/TAKEN: CPT | Mod: CPTII,S$GLB,, | Performed by: INTERNAL MEDICINE

## 2022-03-28 PROCEDURE — 86200 CCP ANTIBODY: CPT | Performed by: INTERNAL MEDICINE

## 2022-03-28 PROCEDURE — 3008F PR BODY MASS INDEX (BMI) DOCUMENTED: ICD-10-PCS | Mod: CPTII,S$GLB,, | Performed by: INTERNAL MEDICINE

## 2022-03-28 PROCEDURE — 99214 OFFICE O/P EST MOD 30 MIN: CPT | Mod: S$GLB,,, | Performed by: INTERNAL MEDICINE

## 2022-03-28 RX ORDER — PROMETHAZINE HYDROCHLORIDE AND CODEINE PHOSPHATE 6.25; 1 MG/5ML; MG/5ML
5 SOLUTION ORAL EVERY 6 HOURS PRN
Qty: 118 ML | Refills: 0 | Status: SHIPPED | OUTPATIENT
Start: 2022-03-28 | End: 2022-04-04

## 2022-03-28 RX ORDER — BENZONATATE 100 MG/1
200 CAPSULE ORAL 3 TIMES DAILY PRN
Qty: 180 CAPSULE | Refills: 0 | Status: SHIPPED | OUTPATIENT
Start: 2022-03-28 | End: 2022-04-27

## 2022-03-28 NOTE — ASSESSMENT & PLAN NOTE
Continue oxygen for now and repeat 6 minute walk test  - may be able to change to nightime only oxygen with his cpap

## 2022-03-28 NOTE — PROGRESS NOTES
"  Subjective:   Patient ID:  Ned Brock is a 61 y.o. male    Reason for visit: follow up     Mr. Brock is a 60 yo with obesity and chronic bronchitis post covid that is here for follow up. Since his last visit he has been doing well. He does still have a cough that is worse at night and has some dyspnea on exertion. He has been measuring his oxygen levels at home and they have been staying in the 94-96% range. He is still having some "soreness" and pain in his bilateral elbows and knees. It doesn't sound like typical arthritis pain but more of a sore after a hard workout. He has no history of typical arthritis pain. He is still unable to perform full duties that he would be required to for his job especially the heavy lifting and exertion.   He did complete his sleep study and has now had his cpap for almost a month . He can tell a big difference in his sleep and is feeling much less tired.        Review of Systems   Constitutional: Negative for diaphoresis, fever and malaise/fatigue.   HENT: Positive for congestion. Negative for hearing loss and nosebleeds.    Eyes: Negative for blurred vision, discharge and redness.   Respiratory: Positive for cough and shortness of breath. Negative for hemoptysis and sputum production.    Cardiovascular: Negative for chest pain, claudication, leg swelling and PND.   Gastrointestinal: Negative for abdominal pain, heartburn, nausea and vomiting.   Genitourinary: Negative.    Musculoskeletal: Positive for myalgias. Negative for back pain, joint pain and neck pain.   Skin: Negative.    Neurological: Positive for weakness. Negative for dizziness, focal weakness, seizures and loss of consciousness.   Psychiatric/Behavioral: Negative.       Objective:     Vitals:    03/28/22 1109   BP: (!) 160/78   Pulse: 88   SpO2: 96%   Weight: 125.2 kg (276 lb 0.3 oz)   Height: 5' 10" (1.778 m)         Physical Exam  Constitutional:       General: He is not in acute distress.     Appearance: He is " obese. He is not diaphoretic.   HENT:      Head: Normocephalic and atraumatic.      Right Ear: External ear normal.      Left Ear: External ear normal.   Eyes:      Conjunctiva/sclera: Conjunctivae normal.      Pupils: Pupils are equal, round, and reactive to light.   Neck:      Trachea: No tracheal deviation.   Cardiovascular:      Rate and Rhythm: Normal rate and regular rhythm.      Heart sounds: Normal heart sounds. No murmur heard.  Pulmonary:      Effort: Pulmonary effort is normal. No respiratory distress.      Breath sounds: Normal breath sounds. No stridor. No wheezing or rales.   Abdominal:      General: Bowel sounds are normal. There is no distension.      Palpations: Abdomen is soft.      Tenderness: There is no abdominal tenderness.   Musculoskeletal:         General: Normal range of motion.      Cervical back: Normal range of motion and neck supple.   Skin:     General: Skin is warm and dry.      Findings: No erythema.   Neurological:      Mental Status: He is alert and oriented to person, place, and time.      Gait: Gait is intact.   Psychiatric:         Mood and Affect: Mood and affect normal.         Cognition and Memory: Memory normal.         Judgment: Judgment normal.          Personal Diagnostic Review and Interpretation  Reviewed all testing as previously ordered      Assessment:     1. Type 2 diabetes mellitus with other specified complication, without long-term current use of insulin  Pneumococcal polysaccharide vaccine 23-valent greater than or equal to 1yo subcutaneous/IM    Sedimentation rate    CK    RHEUMATOID FACTOR    DARON    CYCLIC CITRUL PEPTIDE ANTIBODY, IGG    Comprehensive Metabolic Panel    CBC auto differential    Echo   2. Severe obesity (BMI >= 40)  Pneumococcal polysaccharide vaccine 23-valent greater than or equal to 1yo subcutaneous/IM   3. Pneumonia due to COVID-19 virus  Pneumococcal polysaccharide vaccine 23-valent greater than or equal to 1yo subcutaneous/IM    Stress  test, pulmonary   4. YANETH (obstructive sleep apnea)  Pneumococcal polysaccharide vaccine 23-valent greater than or equal to 1yo subcutaneous/IM    Echo   5. Hypoxia  Stress test, pulmonary    Sedimentation rate    CK    RHEUMATOID FACTOR    DARON    CYCLIC CITRUL PEPTIDE ANTIBODY, IGG    Comprehensive Metabolic Panel    CBC auto differential   6. Arthralgia, unspecified joint  Sedimentation rate    CK    RHEUMATOID FACTOR    DARON    CYCLIC CITRUL PEPTIDE ANTIBODY, IGG    Comprehensive Metabolic Panel    CBC auto differential   7. Shortness of breath  benzonatate (TESSALON) 100 MG capsule    promethazine-codeine 6.25-10 mg/5 ml (PHENERGAN WITH CODEINE) 6.25-10 mg/5 mL syrup   8. Cough  benzonatate (TESSALON) 100 MG capsule    promethazine-codeine 6.25-10 mg/5 ml (PHENERGAN WITH CODEINE) 6.25-10 mg/5 mL syrup   9. Chronic bronchitis, unspecified chronic bronchitis type     10. Myalgia         Current Outpatient Medications   Medication Instructions    albuterol (ACCUNEB) 2.5 mg, Nebulization, Every 6 hours PRN, Rescue    ascorbic acid (VITAMIN C ORAL) 1 capsule, Oral, Daily    benzonatate (TESSALON) 200 mg, Oral, 3 times daily PRN    blood sugar diagnostic Strp Use as instructed to check blood sugar three times daily before meals and at bedtime (ICD-10-CM: E11.9)    blood sugar diagnostic Strp 1 strip, Misc.(Non-Drug; Combo Route), 2 times daily with meals    blood-glucose meter (FREESTYLE FREEDOM) kit Use as instructed    blood-glucose meter kit Use as instructed    ergocalciferol, vitamin D2, (VITAMIN D ORAL) 1 capsule, Oral, Daily    flash glucose scanning reader (FREESTYLE JANETH 14 DAY READER) Misc IU    flash glucose sensor (FREESTYLE JANETH 14 DAY SENSOR) Kit IU    fluticasone propionate (FLONASE) 50 mcg/actuation nasal spray 1 spray, Each Nostril, Daily PRN    guaiFENesin (MUCINEX) 1,200 mg, Oral, 2 times daily PRN    insulin detemir U-100 (LEVEMIR FLEXTOUCH) 5 Units, Subcutaneous, Nightly    lancets  "30 gauge Misc Use as instructed to check blood sugar three times daily before meals and at bedtime (ICD-10-CM: E11.9)    lancets Misc 1 lancet, Misc.(Non-Drug; Combo Route), 2 times daily with meals    lancing device Misc Use as instructed to check blood sugar three times daily before meals and at bedtime (ICD-10-CM: E11.9)    lancing device Misc 1 Device, Misc.(Non-Drug; Combo Route), 2 times daily with meals    lisinopriL (PRINIVIL,ZESTRIL) 5 mg, Oral, Daily    pen needle, diabetic (BD ULTRA-FINE ANDRES PEN NEEDLE) 32 gauge x 5/32" Ndle Use with insulin pens to inject insulin under the skin    promethazine-codeine 6.25-10 mg/5 ml (PHENERGAN WITH CODEINE) 6.25-10 mg/5 mL syrup 5 mLs, Oral, Every 6 hours PRN    pulse oximeter (PULSE OXIMETER) device Use twice daily at 8 AM and 3 PM and [facility] the [facility] as directed.    semaglutide (OZEMPIC) 0.25 mg or 0.5 mg(2 mg/1.5 mL) pen injector Inject 0.25 mg into the skin every 7 days for 30 days, THEN 0.5 mg every 7 days.    ZINC ORAL 1 tablet, Oral, Daily      Ned Brock had no medications administered during this visit.     Orders Placed This Encounter   Procedures    Pneumococcal polysaccharide vaccine 23-valent greater than or equal to 1yo subcutaneous/IM    Sedimentation rate     Standing Status:   Future     Number of Occurrences:   1     Standing Expiration Date:   5/27/2023    CK     Standing Status:   Future     Number of Occurrences:   1     Standing Expiration Date:   5/27/2023    RHEUMATOID FACTOR     Standing Status:   Future     Number of Occurrences:   1     Standing Expiration Date:   5/27/2023    DARON     Standing Status:   Future     Number of Occurrences:   1     Standing Expiration Date:   5/27/2023    CYCLIC CITRUL PEPTIDE ANTIBODY, IGG     Standing Status:   Future     Number of Occurrences:   1     Standing Expiration Date:   5/27/2023    Comprehensive Metabolic Panel     Standing Status:   Future     Number of Occurrences:   " 1     Standing Expiration Date:   5/27/2023    CBC auto differential     Standing Status:   Future     Number of Occurrences:   1     Standing Expiration Date:   5/27/2023    Echo     Standing Status:   Future     Standing Expiration Date:   3/28/2023     Order Specific Question:   Release to patient     Answer:   Immediate    Stress test, pulmonary     Repeat oxygen needs     Standing Status:   Future     Number of Occurrences:   1     Standing Expiration Date:   3/28/2023     Order Specific Question:   Reason for study     Answer:   Oxygen prescription     Order Specific Question:   Release to patient     Answer:   Immediate      Plan:       Hypoxia  Continue oxygen for now and repeat 6 minute walk test  - may be able to change to nightime only oxygen with his cpap    Chronic bronchitis  Symptoms improved and have worsened again recently with allergy season  - continue claritin and flonase  - stop flovent as it did not improve or change symptoms and PFTs are within normal limits  - hold off on returning to work given the amount of exertion requried.     Myalgia  Chronic elbow/arm and knee leg pain  - checking skyler, crp, ck, esr, RF and ccp         30 minutes of total time spent on the encounter, which includes face-to-face time and non-face-to-face time preparing to see the patient (eg, review of tests), obtaining and/or reviewing separately obtained history, documenting clinical information in the electronic or other health record, independently interpreting results (not separately reported), and communicating results to the patient/family/caregiver, or care coordination (not separately reported).     Portions of the record may have been created with voice-recognition software. Occasional wrong-word or sound-a-like substitutions may have occurred due to the inherent limitations of voice-recognition software. Read the chart carefully and recognize, using context, where substitutions have occurred.

## 2022-03-28 NOTE — ASSESSMENT & PLAN NOTE
Symptoms improved and have worsened again recently with allergy season  - continue claritin and flonase  - stop flovent as it did not improve or change symptoms and PFTs are within normal limits  - hold off on returning to work given the amount of exertion requried.

## 2022-03-29 LAB — ANA SER QL IF: NORMAL

## 2022-03-29 NOTE — PROCEDURES
Ned Brock is a 61 y.o.  male patient, who presents for a 6 minute walk test ordered by MD Anand.  The diagnosis is Shortness of Breath.  The patient's BMI is 39.6 kg/m2.  Predicted distance (lower limit of normal) is 355.38 meters.      Test Results:    The test was completed without stopping.    The total time walked was 360 seconds.  During walking, the patient reported:  Dyspnea. The patient used no assistive devices  during testing.     03/28/2022---------Distance: 335.28 meters (1100 feet)     O2 Sat % Supplemental Oxygen Heart Rate Blood Pressure Patrice Scale   Pre-exercise  (Resting) 95 % Room Air 80 bpm 169/82 mmHg 0   During Exercise 92 % Room Air 110 bpm (!) 185/93 mmHg 3   Post-exercise  (Recovery) 96 % Room Air  95 bpm (!) 180/92 mmHg       Recovery Time: 83 seconds    Performing nurse/tech: Martha MERCHANT      PREVIOUS STUDY:   The patient has not had a previous study.      CLINICAL INTERPRETATION:  Six minute walk distance is 335.28 meters (1100 feet) with moderate dyspnea.  During exercise, there was significant desaturation while breathing room air.  Blood pressure remained stable and Heart rate increased significantly with walking.  Hypertension was present prior to exercise.  This may represent a tachycardic response to exercise.  The patient did not report non-pulmonary symptoms during exercise.  No previous study performed.  Based upon age and body mass index, exercise capacity is less than predicted.

## 2022-03-31 DIAGNOSIS — Z12.11 SCREENING FOR COLON CANCER: ICD-10-CM

## 2022-04-04 ENCOUNTER — HOSPITAL ENCOUNTER (OUTPATIENT)
Dept: CARDIOLOGY | Facility: HOSPITAL | Age: 62
Discharge: HOME | End: 2022-04-04
Attending: INTERNAL MEDICINE
Payer: COMMERCIAL

## 2022-04-04 DIAGNOSIS — G47.33 OSA (OBSTRUCTIVE SLEEP APNEA): ICD-10-CM

## 2022-04-04 DIAGNOSIS — E11.69 TYPE 2 DIABETES MELLITUS WITH OTHER SPECIFIED COMPLICATION, WITHOUT LONG-TERM CURRENT USE OF INSULIN: ICD-10-CM

## 2022-04-04 LAB
AORTIC ROOT ANNULUS: 3.34 CM
AORTIC VALVE CUSP SEPERATION: 2.26 CM
ASCENDING AORTA: 3.29 CM
AV INDEX (PROSTH): 0.79
AV MEAN GRADIENT: 10 MMHG
AV PEAK GRADIENT: 16 MMHG
AV VALVE AREA: 2.77 CM2
AV VELOCITY RATIO: 0.69
CV ECHO LV RWT: 0.51 CM
DOP CALC AO PEAK VEL: 2.03 M/S
DOP CALC AO VTI: 38.55 CM
DOP CALC LVOT AREA: 3.5 CM2
DOP CALC LVOT DIAMETER: 2.11 CM
DOP CALC LVOT PEAK VEL: 1.4 M/S
DOP CALC LVOT STROKE VOLUME: 106.94 CM3
DOP CALCLVOT PEAK VEL VTI: 30.6 CM
E WAVE DECELERATION TIME: 245.49 MSEC
E/A RATIO: 0.89
ECHO LV POSTERIOR WALL: 1.21 CM (ref 0.6–1.1)
EJECTION FRACTION: 60 %
FRACTIONAL SHORTENING: 45 % (ref 28–44)
INTERVENTRICULAR SEPTUM: 1.31 CM (ref 0.6–1.1)
IVRT: 207.61 MSEC
LA MAJOR: 6.84 CM
LA MINOR: 6.6 CM
LA WIDTH: 4.44 CM
LEFT ATRIUM SIZE: 4.14 CM
LEFT ATRIUM VOLUME: 104.96 CM3
LEFT INTERNAL DIMENSION IN SYSTOLE: 2.61 CM (ref 2.1–4)
LEFT VENTRICLE DIASTOLIC VOLUME: 103.37 ML
LEFT VENTRICLE SYSTOLIC VOLUME: 24.83 ML
LEFT VENTRICULAR INTERNAL DIMENSION IN DIASTOLE: 4.72 CM (ref 3.5–6)
LEFT VENTRICULAR MASS: 228.86 G
MV PEAK A VEL: 0.94 M/S
MV PEAK E VEL: 0.84 M/S
MV STENOSIS PRESSURE HALF TIME: 71.19 MS
MV VALVE AREA P 1/2 METHOD: 3.09 CM2
PISA TR MAX VEL: 2.48 M/S
PULM VEIN S/D RATIO: 1.88
PV PEAK D VEL: 0.4 M/S
PV PEAK S VEL: 0.75 M/S
PV PEAK VELOCITY: 1.35 CM/S
RA MAJOR: 5.95 CM
RA PRESSURE: 3 MMHG
RA WIDTH: 4.22 CM
RIGHT VENTRICULAR END-DIASTOLIC DIMENSION: 4.71 CM
SINUS: 3.27 CM
STJ: 2.88 CM
TR MAX PG: 25 MMHG
TRICUSPID ANNULAR PLANE SYSTOLIC EXCURSION: 1.95 CM
TV REST PULMONARY ARTERY PRESSURE: 28 MMHG

## 2022-04-04 PROCEDURE — 93306 TTE W/DOPPLER COMPLETE: CPT | Mod: 26,,, | Performed by: INTERNAL MEDICINE

## 2022-04-04 PROCEDURE — 93306 TTE W/DOPPLER COMPLETE: CPT

## 2022-04-04 PROCEDURE — 93306 ECHO (CUPID ONLY): ICD-10-PCS | Mod: 26,,, | Performed by: INTERNAL MEDICINE

## 2022-04-15 LAB — HEMOCCULT STL QL IA: NEGATIVE

## 2022-04-18 ENCOUNTER — OFFICE VISIT (OUTPATIENT)
Dept: SLEEP MEDICINE | Facility: CLINIC | Age: 62
End: 2022-04-18
Payer: COMMERCIAL

## 2022-04-18 DIAGNOSIS — I10 ESSENTIAL HYPERTENSION: ICD-10-CM

## 2022-04-18 DIAGNOSIS — E66.01 SEVERE OBESITY (BMI >= 40): ICD-10-CM

## 2022-04-18 DIAGNOSIS — G47.33 OSA (OBSTRUCTIVE SLEEP APNEA): Primary | ICD-10-CM

## 2022-04-18 DIAGNOSIS — G47.10 HYPERSOMNIA: ICD-10-CM

## 2022-04-18 PROCEDURE — 1160F RVW MEDS BY RX/DR IN RCRD: CPT | Mod: CPTII,S$GLB,, | Performed by: INTERNAL MEDICINE

## 2022-04-18 PROCEDURE — 3046F PR MOST RECENT HEMOGLOBIN A1C LEVEL > 9.0%: ICD-10-PCS | Mod: CPTII,S$GLB,, | Performed by: INTERNAL MEDICINE

## 2022-04-18 PROCEDURE — 1159F PR MEDICATION LIST DOCUMENTED IN MEDICAL RECORD: ICD-10-PCS | Mod: CPTII,S$GLB,, | Performed by: INTERNAL MEDICINE

## 2022-04-18 PROCEDURE — 99999 PR PBB SHADOW E&M-EST. PATIENT-LVL II: CPT | Mod: PBBFAC,,, | Performed by: INTERNAL MEDICINE

## 2022-04-18 PROCEDURE — 1159F MED LIST DOCD IN RCRD: CPT | Mod: CPTII,S$GLB,, | Performed by: INTERNAL MEDICINE

## 2022-04-18 PROCEDURE — 1160F PR REVIEW ALL MEDS BY PRESCRIBER/CLIN PHARMACIST DOCUMENTED: ICD-10-PCS | Mod: CPTII,S$GLB,, | Performed by: INTERNAL MEDICINE

## 2022-04-18 PROCEDURE — 99213 PR OFFICE/OUTPT VISIT, EST, LEVL III, 20-29 MIN: ICD-10-PCS | Mod: S$GLB,,, | Performed by: INTERNAL MEDICINE

## 2022-04-18 PROCEDURE — 3061F NEG MICROALBUMINURIA REV: CPT | Mod: CPTII,S$GLB,, | Performed by: INTERNAL MEDICINE

## 2022-04-18 PROCEDURE — 4010F ACE/ARB THERAPY RXD/TAKEN: CPT | Mod: CPTII,S$GLB,, | Performed by: INTERNAL MEDICINE

## 2022-04-18 PROCEDURE — 4010F PR ACE/ARB THEARPY RXD/TAKEN: ICD-10-PCS | Mod: CPTII,S$GLB,, | Performed by: INTERNAL MEDICINE

## 2022-04-18 PROCEDURE — 3046F HEMOGLOBIN A1C LEVEL >9.0%: CPT | Mod: CPTII,S$GLB,, | Performed by: INTERNAL MEDICINE

## 2022-04-18 PROCEDURE — 3066F NEPHROPATHY DOC TX: CPT | Mod: CPTII,S$GLB,, | Performed by: INTERNAL MEDICINE

## 2022-04-18 PROCEDURE — 3061F PR NEG MICROALBUMINURIA RESULT DOCUMENTED/REVIEW: ICD-10-PCS | Mod: CPTII,S$GLB,, | Performed by: INTERNAL MEDICINE

## 2022-04-18 PROCEDURE — 99999 PR PBB SHADOW E&M-EST. PATIENT-LVL II: ICD-10-PCS | Mod: PBBFAC,,, | Performed by: INTERNAL MEDICINE

## 2022-04-18 PROCEDURE — 99213 OFFICE O/P EST LOW 20 MIN: CPT | Mod: S$GLB,,, | Performed by: INTERNAL MEDICINE

## 2022-04-18 PROCEDURE — 3066F PR DOCUMENTATION OF TREATMENT FOR NEPHROPATHY: ICD-10-PCS | Mod: CPTII,S$GLB,, | Performed by: INTERNAL MEDICINE

## 2022-04-18 NOTE — PROGRESS NOTES
Subjective:       Patient ID: Ned Brock is a 61 y.o. male.    Chief Complaint: Sleeping Problem    HPI     Ned Brock returns for PAP follow up for compliance documentation    Ned Brock has a history of Obstructive Sleep Apnea diagnosed in 2021.   The last PAP titration was NA with AHI NA at NA cm H20.   Device is 1-2 month(s) old.   Current setting 5-20 cm H20.   DME is [facility].      Ned Brock is using CPAP 97% of nights and averages 5 hours and 12 minutes.   Device use greater than 4 hours is 73%.   Patient does not have problems with the pressure setting.   Mask interface issues are experienced.   A NP mask interface is used.   The patient does experience side effects from therapy including mask leak.   The patient experiences the following benefits of therapy:  more rested, reduced morbidity, reduced cardiovascular risk and less sleep disruption   There are not equipment issues.   Mean pressure 4.4, average peak pressure 13.1 and 90th percentile pressure 8.9.   Estimated AHI 11.7 (O).       Importance of PAP compliance discussed.   Care and use of equipment discussed.   Download and relevant sleep studies reviewed with patient    Discussed therapeutic goals for positive airway pressure therapy(CPAP or BiPAP).  Ideal is usage 100% of nights for at least 6 hours per night.  Minimum usage is 70% of night for at least 4 hours per night used    The patient was given open opportunity to ask questions and/or express concerns about treatment plan.   All questions/concerns were discussed.            Review of Systems      Objective:      Physical Exam    Assessment:       Problem List Items Addressed This Visit        Cardiac/Vascular    Essential hypertension (Chronic)       Endocrine    Severe obesity (BMI >= 40) (Chronic)       Other    YANETH (obstructive sleep apnea) - Primary      Other Visit Diagnoses     Hypersomnia              Plan:       Mask alternatives discussed.   Change pressure 7-15 with  repeat download.   Follow up 1 year.

## 2022-04-19 ENCOUNTER — LAB VISIT (OUTPATIENT)
Dept: LAB | Facility: HOSPITAL | Age: 62
End: 2022-04-19
Attending: INTERNAL MEDICINE
Payer: COMMERCIAL

## 2022-04-19 ENCOUNTER — TELEPHONE (OUTPATIENT)
Dept: INTERNAL MEDICINE | Facility: CLINIC | Age: 62
End: 2022-04-19
Payer: COMMERCIAL

## 2022-04-19 DIAGNOSIS — E11.9 TYPE 2 DIABETES MELLITUS WITHOUT COMPLICATION, WITHOUT LONG-TERM CURRENT USE OF INSULIN: ICD-10-CM

## 2022-04-19 LAB
CHOLEST SERPL-MCNC: 213 MG/DL (ref 120–199)
CHOLEST/HDLC SERPL: 7.1 {RATIO} (ref 2–5)
ESTIMATED AVG GLUCOSE: 174 MG/DL (ref 68–131)
HBA1C MFR BLD: 7.7 % (ref 4–5.6)
HDLC SERPL-MCNC: 30 MG/DL (ref 40–75)
HDLC SERPL: 14.1 % (ref 20–50)
LDLC SERPL CALC-MCNC: 123.8 MG/DL (ref 63–159)
NONHDLC SERPL-MCNC: 183 MG/DL
TRIGL SERPL-MCNC: 296 MG/DL (ref 30–150)

## 2022-04-19 PROCEDURE — 36415 COLL VENOUS BLD VENIPUNCTURE: CPT | Performed by: INTERNAL MEDICINE

## 2022-04-19 PROCEDURE — 80061 LIPID PANEL: CPT | Performed by: INTERNAL MEDICINE

## 2022-04-19 PROCEDURE — 83036 HEMOGLOBIN GLYCOSYLATED A1C: CPT | Performed by: INTERNAL MEDICINE

## 2022-04-19 PROCEDURE — 86803 HEPATITIS C AB TEST: CPT | Performed by: INTERNAL MEDICINE

## 2022-04-19 RX ORDER — SEMAGLUTIDE 1.34 MG/ML
1 INJECTION, SOLUTION SUBCUTANEOUS
Qty: 1 PEN | Refills: 11 | Status: SHIPPED | OUTPATIENT
Start: 2022-04-19 | End: 2022-05-02

## 2022-04-19 NOTE — TELEPHONE ENCOUNTER
----- Message from Donaldo Willis MD sent at 4/19/2022  3:03 PM CDT -----  Can we set a virtual visit?

## 2022-04-20 LAB — HCV AB SERPL QL IA: NEGATIVE

## 2022-04-20 NOTE — TELEPHONE ENCOUNTER
Ned Brock ([place]donnell: [id number]) [employer] is unable to retrieve the clinical questions that must be submitted to initiate the PA request. Please see more information at the bottom of the page for next steps.

## 2022-04-25 ENCOUNTER — TELEPHONE (OUTPATIENT)
Dept: PULMONOLOGY | Facility: CLINIC | Age: 62
End: 2022-04-25
Payer: COMMERCIAL

## 2022-04-25 ENCOUNTER — TELEPHONE (OUTPATIENT)
Dept: INTERNAL MEDICINE | Facility: CLINIC | Age: 62
End: 2022-04-25
Payer: COMMERCIAL

## 2022-04-25 NOTE — TELEPHONE ENCOUNTER
----- Message from Jesenia Nelson sent at 4/25/2022  2:21 PM CDT -----  Contact: Pt @ [phone]  Caller is requesting an earlier appointment then we can schedule.  Caller is requesting a message be sent to the provider.    If this is for urgent care symptoms, did you offer other providers at this location, providers at other locations, or [facility]? (yes, no, n/a):  Yes     If this is for the patients physical, did you offer to schedule next available and put on wait list, or to see NP or PA for their physical?  (yes, no, n/a):  Yes     When is the next available appointment with their provider:  7/25    Reason for the appointment:   Pt had covid and need a doctor note to return to work but need to be seen and also would like to go over test results.     Patient preference of timeframe to be scheduled:  today     Would the patient like a call back, or a response through their [facility] portal?:  call emeka     Comments:     Pt is requesting a call back to advise.

## 2022-04-25 NOTE — TELEPHONE ENCOUNTER
----- Message from Stephanie Dia sent at 4/25/2022  4:45 PM CDT -----  Pt requesting call back       Confirmed patient's contact info below:  Contact Name: Ned Brock  Phone Number: [phone]

## 2022-04-25 NOTE — TELEPHONE ENCOUNTER
----- Message from Dwain Antunez sent at 4/25/2022  1:01 PM CDT -----  Regarding: Pt Inquiry  Pt called and requesting a call back from Anny in regards to an appt.      [phone] (home)

## 2022-04-25 NOTE — TELEPHONE ENCOUNTER
Left message on patients voicemail informing him that I was calling in regard to scheduling his appointment and patient should call the office to get appoint scheduled or if he had any questions or concerns. Office number provided.

## 2022-04-25 NOTE — TELEPHONE ENCOUNTER
Left message on patient voicemail letting him know that I have received his message and was trying to schedule his appointment. Office number was left for patient to call back with any questions or concerns.

## 2022-04-25 NOTE — TELEPHONE ENCOUNTER
LVM Advising patient  to give me a call back at his earliest convenience.  My name and office number was provided to receive a call back.

## 2022-04-25 NOTE — TELEPHONE ENCOUNTER
Spoke with patient, informed him that I have received his message. Patient states that he would like to visit with Dr Michel tomorrow or This up coming Thursday. I verbalized to patient that I understand and advised patient that Dr Michel does not have any available appointments at that current time. Patient verbalized that he understand and states that he will follow up with his PCP. I verbalized to patient that I understand.

## 2022-04-27 ENCOUNTER — TELEPHONE (OUTPATIENT)
Dept: INTERNAL MEDICINE | Facility: CLINIC | Age: 62
End: 2022-04-27
Payer: COMMERCIAL

## 2022-04-27 NOTE — TELEPHONE ENCOUNTER
So after processing through [employer] come to find out that they do not manage this patients benefit plan. It is actually [employer]. I down loaded a form and placed on the physicians desk.

## 2022-04-27 NOTE — TELEPHONE ENCOUNTER
----- Message from Chahca Amezcua sent at 4/26/2022  9:23 AM CDT -----  Contact: [phone]  [employer] is calling to follow up on a Prior Auth for this Patients Ozempic medication.Please advise reference number is [id number]

## 2022-05-02 ENCOUNTER — OFFICE VISIT (OUTPATIENT)
Dept: INTERNAL MEDICINE | Facility: CLINIC | Age: 62
End: 2022-05-02
Payer: COMMERCIAL

## 2022-05-02 VITALS
DIASTOLIC BLOOD PRESSURE: 104 MMHG | BODY MASS INDEX: 39.65 KG/M2 | WEIGHT: 277 LBS | HEIGHT: 70 IN | HEART RATE: 87 BPM | SYSTOLIC BLOOD PRESSURE: 144 MMHG | OXYGEN SATURATION: 96 %

## 2022-05-02 DIAGNOSIS — G47.33 OSA (OBSTRUCTIVE SLEEP APNEA): ICD-10-CM

## 2022-05-02 DIAGNOSIS — E11.69 TYPE 2 DIABETES MELLITUS WITH OTHER SPECIFIED COMPLICATION, WITHOUT LONG-TERM CURRENT USE OF INSULIN: Primary | ICD-10-CM

## 2022-05-02 DIAGNOSIS — R06.09 DYSPNEA ON EXERTION: ICD-10-CM

## 2022-05-02 DIAGNOSIS — Z86.16 PERSONAL HISTORY OF COVID-19: ICD-10-CM

## 2022-05-02 DIAGNOSIS — I10 ESSENTIAL HYPERTENSION, BENIGN: ICD-10-CM

## 2022-05-02 DIAGNOSIS — R53.81 DEBILITY: ICD-10-CM

## 2022-05-02 PROCEDURE — 99999 PR PBB SHADOW E&M-EST. PATIENT-LVL V: ICD-10-PCS | Mod: PBBFAC,,, | Performed by: INTERNAL MEDICINE

## 2022-05-02 PROCEDURE — 3061F PR NEG MICROALBUMINURIA RESULT DOCUMENTED/REVIEW: ICD-10-PCS | Mod: CPTII,S$GLB,, | Performed by: INTERNAL MEDICINE

## 2022-05-02 PROCEDURE — 1160F PR REVIEW ALL MEDS BY PRESCRIBER/CLIN PHARMACIST DOCUMENTED: ICD-10-PCS | Mod: CPTII,S$GLB,, | Performed by: INTERNAL MEDICINE

## 2022-05-02 PROCEDURE — 3077F PR MOST RECENT SYSTOLIC BLOOD PRESSURE >= 140 MM HG: ICD-10-PCS | Mod: CPTII,S$GLB,, | Performed by: INTERNAL MEDICINE

## 2022-05-02 PROCEDURE — 3061F NEG MICROALBUMINURIA REV: CPT | Mod: CPTII,S$GLB,, | Performed by: INTERNAL MEDICINE

## 2022-05-02 PROCEDURE — 3066F PR DOCUMENTATION OF TREATMENT FOR NEPHROPATHY: ICD-10-PCS | Mod: CPTII,S$GLB,, | Performed by: INTERNAL MEDICINE

## 2022-05-02 PROCEDURE — 3051F HG A1C>EQUAL 7.0%<8.0%: CPT | Mod: CPTII,S$GLB,, | Performed by: INTERNAL MEDICINE

## 2022-05-02 PROCEDURE — 1159F MED LIST DOCD IN RCRD: CPT | Mod: CPTII,S$GLB,, | Performed by: INTERNAL MEDICINE

## 2022-05-02 PROCEDURE — 1160F RVW MEDS BY RX/DR IN RCRD: CPT | Mod: CPTII,S$GLB,, | Performed by: INTERNAL MEDICINE

## 2022-05-02 PROCEDURE — 99215 OFFICE O/P EST HI 40 MIN: CPT | Mod: S$GLB,,, | Performed by: INTERNAL MEDICINE

## 2022-05-02 PROCEDURE — 1159F PR MEDICATION LIST DOCUMENTED IN MEDICAL RECORD: ICD-10-PCS | Mod: CPTII,S$GLB,, | Performed by: INTERNAL MEDICINE

## 2022-05-02 PROCEDURE — 3077F SYST BP >= 140 MM HG: CPT | Mod: CPTII,S$GLB,, | Performed by: INTERNAL MEDICINE

## 2022-05-02 PROCEDURE — 99999 PR PBB SHADOW E&M-EST. PATIENT-LVL V: CPT | Mod: PBBFAC,,, | Performed by: INTERNAL MEDICINE

## 2022-05-02 PROCEDURE — 3051F PR MOST RECENT HEMOGLOBIN A1C LEVEL 7.0 - < 8.0%: ICD-10-PCS | Mod: CPTII,S$GLB,, | Performed by: INTERNAL MEDICINE

## 2022-05-02 PROCEDURE — 3080F PR MOST RECENT DIASTOLIC BLOOD PRESSURE >= 90 MM HG: ICD-10-PCS | Mod: CPTII,S$GLB,, | Performed by: INTERNAL MEDICINE

## 2022-05-02 PROCEDURE — 3008F PR BODY MASS INDEX (BMI) DOCUMENTED: ICD-10-PCS | Mod: CPTII,S$GLB,, | Performed by: INTERNAL MEDICINE

## 2022-05-02 PROCEDURE — 3066F NEPHROPATHY DOC TX: CPT | Mod: CPTII,S$GLB,, | Performed by: INTERNAL MEDICINE

## 2022-05-02 PROCEDURE — 4010F PR ACE/ARB THEARPY RXD/TAKEN: ICD-10-PCS | Mod: CPTII,S$GLB,, | Performed by: INTERNAL MEDICINE

## 2022-05-02 PROCEDURE — 3080F DIAST BP >= 90 MM HG: CPT | Mod: CPTII,S$GLB,, | Performed by: INTERNAL MEDICINE

## 2022-05-02 PROCEDURE — 3008F BODY MASS INDEX DOCD: CPT | Mod: CPTII,S$GLB,, | Performed by: INTERNAL MEDICINE

## 2022-05-02 PROCEDURE — 99215 PR OFFICE/OUTPT VISIT, EST, LEVL V, 40-54 MIN: ICD-10-PCS | Mod: S$GLB,,, | Performed by: INTERNAL MEDICINE

## 2022-05-02 PROCEDURE — 4010F ACE/ARB THERAPY RXD/TAKEN: CPT | Mod: CPTII,S$GLB,, | Performed by: INTERNAL MEDICINE

## 2022-05-02 RX ORDER — DULAGLUTIDE 0.75 MG/.5ML
0.75 INJECTION, SOLUTION SUBCUTANEOUS
Qty: 4 PEN | Refills: 11 | Status: SHIPPED | OUTPATIENT
Start: 2022-05-02 | End: 2022-05-02

## 2022-05-02 RX ORDER — LISINOPRIL 10 MG/1
10 TABLET ORAL DAILY
Qty: 90 TABLET | Refills: 3 | Status: SHIPPED | OUTPATIENT
Start: 2022-05-02 | End: 2022-10-21

## 2022-05-02 RX ORDER — METFORMIN HYDROCHLORIDE 500 MG/1
500 TABLET, EXTENDED RELEASE ORAL 2 TIMES DAILY WITH MEALS
Qty: 180 TABLET | Refills: 3 | Status: SHIPPED | OUTPATIENT
Start: 2022-05-02 | End: 2023-07-18

## 2022-05-02 NOTE — PROGRESS NOTES
"    CHIEF COMPLAINT     Chief Complaint   Patient presents with    medication review     Pt also wants to see about going to work , but still having memory loss and weak and having some shortness of breath    Shortness of Breath       HPI     Ned Brock is a 61 y.o. 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  Since last visit underwent pulmonary function test and 6 minute walk test.  PFTs were normal 6 minute walk test showed decreased exercise tolerance with O2 sat dropped to the low 90s with physical activity.  Patient also had echocardiogram showing little bit of LVH and slight increased pulmonary artery pressure.    Type 2 diabetes  Taking Levemir 5 units nightly.  Improvement in A1c from 9.8-7.7.  Never started Ozempic due to cost    Personally Reviewed Patient's Medical, surgical, family and social hx. Changes updated in [employer].  Care Team updated in Epic    Review of Systems:  Review of Systems   Respiratory: Positive for shortness of breath.        Health Maintenance:   Reviewed with patient  Due for the following:      PHYSICAL EXAM     BP (!) 144/104 (BP Location: Right arm, Patient Position: Sitting, BP Method: Medium (Manual))   Pulse 87   Ht 5' 10" (1.778 m)   Wt 125.6 kg (277 lb)   SpO2 96%   BMI 39.75 kg/m²     Gen: Well Appearing, NAD  HEENT: PERR, EOMI  Neck: FROM, no thyromegaly, no cervical adenopathy  CVD: RRR, no M/R/G  Pulm: Normal work of breathing, CTAB, no wheezing  Abd:  Soft, NT, ND non TTP, no mass  MSK: no LE edema  Neuro: A&Ox3, gait normal, speech normal  Mood; Mood normal, behavior normal, thought process linear       LABS     Labs reviewed; Notable for    Lab Results   Component Value Date    HGBA1C 7.7 (H) 04/19/2022       Chemistry        Component Value Date/Time     03/28/2022 1244    K 4.0 03/28/2022 1244    CL 96 03/28/2022 1244    CO2 32 (H) 03/28/2022 1244    " BUN 17 03/28/2022 1244    CREATININE 0.8 03/28/2022 1244     (H) 03/28/2022 1244        Component Value Date/Time    CALCIUM 10.2 03/28/2022 1244    ALKPHOS 76 03/28/2022 1244    AST 27 03/28/2022 1244    ALT 42 03/28/2022 1244    BILITOT 0.5 03/28/2022 1244    ESTGFRAFRICA >60.0 03/28/2022 1244    EGFRNONAA >60.0 03/28/2022 1244      6 minute walk test:  Desat to 92%, hypertensive exercise response, moderate dyspnea more than expected  Echocardiogram EF normal with some LVH elevated pulmonary artery pressure slightly elevated 28    ASSESSMENT     1. Type 2 diabetes mellitus with other specified complication, without long-term current use of insulin  metFORMIN (GLUCOPHAGE-XR) 500 MG ER 24hr tablet    DISCONTINUED: dulaglutide (TRULICITY) 0.75 mg/0.5 mL pen injector   2. YANETH (obstructive sleep apnea)     3. Personal history of COVID-19     4. Debility  Ambulatory referral/consult to Physical/Occupational Therapy   5. Dyspnea on exertion  Stress Echo Which stress agent will be used? Treadmill Exercise; Color Flow Doppler? No    Ambulatory referral/consult to Physical/Occupational Therapy   6. Essential hypertension, benign  lisinopriL 10 MG tablet           Plan     Ned Brock is a 61 y.o. male with type 2 diabetes, hypertension, history of COVID-19  1. Type 2 diabetes mellitus with other specified complication, without long-term current use of insulin  Will start metformin 500 b.i.d.  Continue Levemir 5 units-hopefully will stops as we titrate up metformin  - metFORMIN (GLUCOPHAGE-XR) 500 MG ER 24hr tablet; Take 1 tablet (500 mg total) by mouth 2 (two) times daily with meals.  Dispense: 180 tablet; Refill: 3    2. YANETH (obstructive sleep apnea)  Continues PAP device    3. Personal history of COVID-19  Still not able to return to work  Think patient would benefit from physical and occupational therapy  Will get stress test to look for other causes but low suspicion    4. Debility  See above  - Ambulatory  referral/consult to Physical/Occupational Therapy; Future  Completed paperwork for job update  5. Dyspnea on exertion    - Stress Echo Which stress agent will be used? Treadmill Exercise; Color Flow Doppler? No; Future  - Ambulatory referral/consult to Physical/Occupational Therapy; Future    6. Essential hypertension, benign  Increase lisinopril 10 mg daily  - lisinopriL 10 MG tablet; Take 1 tablet (10 mg total) by mouth once daily.  Dispense: 90 tablet; Refill: 3    >40 minutes of professional services provided as part of today's visit.    Donaldo Willis MD

## 2022-05-03 ENCOUNTER — TELEPHONE (OUTPATIENT)
Dept: INTERNAL MEDICINE | Facility: CLINIC | Age: 62
End: 2022-05-03
Payer: COMMERCIAL

## 2022-05-03 NOTE — TELEPHONE ENCOUNTER
----- Message from Karen Barton sent at 5/3/2022 10:31 AM CDT -----  Contact: [phone]  [employer] is calling for the auth that has been started she states they created a new form    Key [id number]    Please advise and give return call    They will be closing the other one out the correct one is for the key listed above

## 2022-05-03 NOTE — TELEPHONE ENCOUNTER
Not sure why they created another Key for [employer]. Pt's insurance does not use [employer]. They use [employer]. See past messages. Forms have been accepted and signed by Dr Willis and faxed to his insurance company.

## 2022-05-26 ENCOUNTER — PATIENT MESSAGE (OUTPATIENT)
Dept: INTERNAL MEDICINE | Facility: CLINIC | Age: 62
End: 2022-05-26
Payer: COMMERCIAL

## 2022-05-26 ENCOUNTER — PATIENT MESSAGE (OUTPATIENT)
Dept: PULMONOLOGY | Facility: CLINIC | Age: 62
End: 2022-05-26
Payer: COMMERCIAL

## 2022-05-27 ENCOUNTER — HOSPITAL ENCOUNTER (OUTPATIENT)
Dept: CARDIOLOGY | Facility: HOSPITAL | Age: 62
Discharge: HOME | End: 2022-05-27
Attending: INTERNAL MEDICINE
Payer: COMMERCIAL

## 2022-05-27 VITALS
HEART RATE: 85 BPM | SYSTOLIC BLOOD PRESSURE: 190 MMHG | DIASTOLIC BLOOD PRESSURE: 96 MMHG | BODY MASS INDEX: 39.37 KG/M2 | WEIGHT: 275 LBS | RESPIRATION RATE: 16 BRPM | HEIGHT: 70 IN

## 2022-05-27 DIAGNOSIS — R06.09 DYSPNEA ON EXERTION: ICD-10-CM

## 2022-05-27 LAB
ASCENDING AORTA: 3.08 CM
BSA FOR ECHO PROCEDURE: 2.48 M2
CV ECHO LV RWT: 0.4 CM
CV STRESS BASE HR: 83 BPM
DIASTOLIC BLOOD PRESSURE: 96 MMHG
DOP CALC LVOT AREA: 4 CM2
DOP CALC LVOT DIAMETER: 2.26 CM
DOP CALC LVOT PEAK VEL: 1.43 M/S
DOP CALC LVOT STROKE VOLUME: 114.03 CM3
DOP CALCLVOT PEAK VEL VTI: 28.44 CM
E WAVE DECELERATION TIME: 159.65 MSEC
E/A RATIO: 1.08
E/E' RATIO: 11.71 M/S
ECHO LV POSTERIOR WALL: 1.04 CM (ref 0.6–1.1)
EJECTION FRACTION: 65 %
FRACTIONAL SHORTENING: 33 % (ref 28–44)
INTERVENTRICULAR SEPTUM: 0.94 CM (ref 0.6–1.1)
IVRT: 82.78 MSEC
LA MAJOR: 6.58 CM
LA MINOR: 6.6 CM
LA WIDTH: 4.16 CM
LEFT ATRIUM SIZE: 3.68 CM
LEFT ATRIUM VOLUME INDEX MOD: 54.4 ML/M2
LEFT ATRIUM VOLUME INDEX: 35.9 ML/M2
LEFT ATRIUM VOLUME MOD: 130 CM3
LEFT ATRIUM VOLUME: 85.75 CM3
LEFT INTERNAL DIMENSION IN SYSTOLE: 3.5 CM (ref 2.1–4)
LEFT VENTRICLE DIASTOLIC VOLUME INDEX: 54.44 ML/M2
LEFT VENTRICLE DIASTOLIC VOLUME: 130.12 ML
LEFT VENTRICLE MASS INDEX: 80 G/M2
LEFT VENTRICLE SYSTOLIC VOLUME INDEX: 21.3 ML/M2
LEFT VENTRICLE SYSTOLIC VOLUME: 50.9 ML
LEFT VENTRICULAR INTERNAL DIMENSION IN DIASTOLE: 5.21 CM (ref 3.5–6)
LEFT VENTRICULAR MASS: 192.19 G
LV LATERAL E/E' RATIO: 11.71 M/S
LV SEPTAL E/E' RATIO: 11.71 M/S
MV A" WAVE DURATION": 6.85 MSEC
MV PEAK A VEL: 0.76 M/S
MV PEAK E VEL: 0.82 M/S
MV STENOSIS PRESSURE HALF TIME: 46.3 MS
MV VALVE AREA P 1/2 METHOD: 4.75 CM2
OHS CV CPX 1 MINUTE RECOVERY HEART RATE: 137 BPM
OHS CV CPX 85 PERCENT MAX PREDICTED HEART RATE MALE: 137
OHS CV CPX MAX PREDICTED HEART RATE: 161
OHS CV CPX PATIENT IS FEMALE: 0
OHS CV CPX PATIENT IS MALE: 1
OHS CV CPX PEAK DIASTOLIC BLOOD PRESSURE: 66 MMHG
OHS CV CPX PEAK HEAR RATE: 142 BPM
OHS CV CPX PEAK RATE PRESSURE PRODUCT: NORMAL
OHS CV CPX PEAK SYSTOLIC BLOOD PRESSURE: 156 MMHG
OHS CV CPX PERCENT MAX PREDICTED HEART RATE ACHIEVED: 88
OHS CV CPX RATE PRESSURE PRODUCT PRESENTING: NORMAL
PISA TR MAX VEL: 2.65 M/S
PULM VEIN S/D RATIO: 1.88
PV PEAK D VEL: 0.33 M/S
PV PEAK S VEL: 0.62 M/S
RA MAJOR: 5.87 CM
RA PRESSURE: 3 MMHG
RA WIDTH: 3.39 CM
RIGHT VENTRICULAR END-DIASTOLIC DIMENSION: 4.08 CM
RV TISSUE DOPPLER FREE WALL SYSTOLIC VELOCITY 1 (APICAL 4 CHAMBER VIEW): 11.18 CM/S
SINUS: 3.18 CM
STJ: 2.73 CM
SYSTOLIC BLOOD PRESSURE: 190 MMHG
TDI LATERAL: 0.07 M/S
TDI SEPTAL: 0.07 M/S
TDI: 0.07 M/S
TR MAX PG: 28 MMHG
TRICUSPID ANNULAR PLANE SYSTOLIC EXCURSION: 2.6 CM
TV REST PULMONARY ARTERY PRESSURE: 31 MMHG

## 2022-05-27 PROCEDURE — 93351 STRESS TTE COMPLETE: CPT

## 2022-05-27 PROCEDURE — 93351 STRESS ECHO (CUPID ONLY): ICD-10-PCS | Mod: 26,,, | Performed by: INTERNAL MEDICINE

## 2022-05-27 PROCEDURE — 93352 ADMIN ECG CONTRAST AGENT: CPT | Mod: ,,, | Performed by: INTERNAL MEDICINE

## 2022-05-27 PROCEDURE — 63600175 PHARM REV CODE 636 W HCPCS: Performed by: INTERNAL MEDICINE

## 2022-05-27 PROCEDURE — 93352 STRESS ECHO (CUPID ONLY): ICD-10-PCS | Mod: ,,, | Performed by: INTERNAL MEDICINE

## 2022-05-27 PROCEDURE — 25500020 PHARM REV CODE 255: Performed by: INTERNAL MEDICINE

## 2022-05-27 PROCEDURE — 93351 STRESS TTE COMPLETE: CPT | Mod: 26,,, | Performed by: INTERNAL MEDICINE

## 2022-05-27 PROCEDURE — 63600150 PHARM REV CODE 636: Performed by: INTERNAL MEDICINE

## 2022-05-27 RX ORDER — ATROPINE SULFATE 0.1 MG/ML
0.75 INJECTION INTRAVENOUS
Status: COMPLETED | OUTPATIENT
Start: 2022-05-27 | End: 2022-05-27

## 2022-05-27 RX ORDER — DOBUTAMINE HYDROCHLORIDE 100 MG/100ML
40 INJECTION INTRAVENOUS
Status: COMPLETED | OUTPATIENT
Start: 2022-05-27 | End: 2022-05-27

## 2022-05-27 RX ADMIN — DOBUTAMINE IN DEXTROSE 40 MCG/KG/MIN: 100 INJECTION, SOLUTION INTRAVENOUS at 02:05

## 2022-05-27 RX ADMIN — ATROPINE SULFATE 0.75 MG: 0.1 INJECTION PARENTERAL at 02:05

## 2022-05-27 RX ADMIN — HUMAN ALBUMIN MICROSPHERES AND PERFLUTREN 0.66 MG: 10; .22 INJECTION, SOLUTION INTRAVENOUS at 01:05

## 2022-05-27 NOTE — NURSING NOTE
Patient verified by 2 identifiers and allergies reviewed.  22 g IV placed to Lt FA.  Denies previous reactions to blood transfusions & DSE consent obtained.  3cc Optison administered, echo images obtained, DSE testing completed.  Pt tolerated testing well. IV removed post testing.  Post study discharge instructions reviewed with patient, patient verbalized understanding.  Patient discharged to home in stable condition.

## 2022-06-02 ENCOUNTER — PATIENT MESSAGE (OUTPATIENT)
Dept: INTERNAL MEDICINE | Facility: CLINIC | Age: 62
End: 2022-06-02

## 2022-06-02 ENCOUNTER — OFFICE VISIT (OUTPATIENT)
Dept: PULMONOLOGY | Facility: CLINIC | Age: 62
End: 2022-06-02
Payer: COMMERCIAL

## 2022-06-02 ENCOUNTER — TELEPHONE (OUTPATIENT)
Dept: INTERNAL MEDICINE | Facility: CLINIC | Age: 62
End: 2022-06-02
Payer: COMMERCIAL

## 2022-06-02 ENCOUNTER — OFFICE VISIT (OUTPATIENT)
Dept: INTERNAL MEDICINE | Facility: CLINIC | Age: 62
End: 2022-06-02
Payer: COMMERCIAL

## 2022-06-02 VITALS
BODY MASS INDEX: 39.83 KG/M2 | WEIGHT: 278.25 LBS | HEIGHT: 70 IN | SYSTOLIC BLOOD PRESSURE: 160 MMHG | DIASTOLIC BLOOD PRESSURE: 86 MMHG | OXYGEN SATURATION: 96 % | HEART RATE: 86 BPM

## 2022-06-02 DIAGNOSIS — I27.20 PULMONARY HYPERTENSION: ICD-10-CM

## 2022-06-02 DIAGNOSIS — J41.0 SIMPLE CHRONIC BRONCHITIS: Chronic | ICD-10-CM

## 2022-06-02 DIAGNOSIS — Z01.818 PREOP EXAMINATION: ICD-10-CM

## 2022-06-02 DIAGNOSIS — Z01.818 PREOP EXAM FOR INTERNAL MEDICINE: Primary | ICD-10-CM

## 2022-06-02 DIAGNOSIS — R06.02 SOB (SHORTNESS OF BREATH): ICD-10-CM

## 2022-06-02 DIAGNOSIS — G47.33 OSA (OBSTRUCTIVE SLEEP APNEA): ICD-10-CM

## 2022-06-02 DIAGNOSIS — E66.01 SEVERE OBESITY (BMI >= 40): Chronic | ICD-10-CM

## 2022-06-02 PROBLEM — U07.1 PNEUMONIA DUE TO COVID-19 VIRUS: Status: RESOLVED | Noted: 2022-01-12 | Resolved: 2022-06-02

## 2022-06-02 PROBLEM — J12.82 PNEUMONIA DUE TO COVID-19 VIRUS: Status: RESOLVED | Noted: 2022-01-12 | Resolved: 2022-06-02

## 2022-06-02 PROCEDURE — 1159F PR MEDICATION LIST DOCUMENTED IN MEDICAL RECORD: ICD-10-PCS | Mod: CPTII,95,, | Performed by: INTERNAL MEDICINE

## 2022-06-02 PROCEDURE — 99213 OFFICE O/P EST LOW 20 MIN: CPT | Mod: S$GLB,,, | Performed by: INTERNAL MEDICINE

## 2022-06-02 PROCEDURE — 4010F PR ACE/ARB THEARPY RXD/TAKEN: ICD-10-PCS | Mod: CPTII,S$GLB,, | Performed by: INTERNAL MEDICINE

## 2022-06-02 PROCEDURE — 1160F RVW MEDS BY RX/DR IN RCRD: CPT | Mod: CPTII,95,, | Performed by: INTERNAL MEDICINE

## 2022-06-02 PROCEDURE — 4010F ACE/ARB THERAPY RXD/TAKEN: CPT | Mod: CPTII,95,, | Performed by: INTERNAL MEDICINE

## 2022-06-02 PROCEDURE — 1159F PR MEDICATION LIST DOCUMENTED IN MEDICAL RECORD: ICD-10-PCS | Mod: CPTII,S$GLB,, | Performed by: INTERNAL MEDICINE

## 2022-06-02 PROCEDURE — 3008F BODY MASS INDEX DOCD: CPT | Mod: CPTII,S$GLB,, | Performed by: INTERNAL MEDICINE

## 2022-06-02 PROCEDURE — 3066F PR DOCUMENTATION OF TREATMENT FOR NEPHROPATHY: ICD-10-PCS | Mod: CPTII,S$GLB,, | Performed by: INTERNAL MEDICINE

## 2022-06-02 PROCEDURE — 3066F PR DOCUMENTATION OF TREATMENT FOR NEPHROPATHY: ICD-10-PCS | Mod: CPTII,95,, | Performed by: INTERNAL MEDICINE

## 2022-06-02 PROCEDURE — 99213 OFFICE O/P EST LOW 20 MIN: CPT | Mod: 95,,, | Performed by: INTERNAL MEDICINE

## 2022-06-02 PROCEDURE — 4010F ACE/ARB THERAPY RXD/TAKEN: CPT | Mod: CPTII,S$GLB,, | Performed by: INTERNAL MEDICINE

## 2022-06-02 PROCEDURE — 3061F PR NEG MICROALBUMINURIA RESULT DOCUMENTED/REVIEW: ICD-10-PCS | Mod: CPTII,S$GLB,, | Performed by: INTERNAL MEDICINE

## 2022-06-02 PROCEDURE — 3051F PR MOST RECENT HEMOGLOBIN A1C LEVEL 7.0 - < 8.0%: ICD-10-PCS | Mod: CPTII,95,, | Performed by: INTERNAL MEDICINE

## 2022-06-02 PROCEDURE — 3051F HG A1C>EQUAL 7.0%<8.0%: CPT | Mod: CPTII,95,, | Performed by: INTERNAL MEDICINE

## 2022-06-02 PROCEDURE — 99213 PR OFFICE/OUTPT VISIT, EST, LEVL III, 20-29 MIN: ICD-10-PCS | Mod: 95,,, | Performed by: INTERNAL MEDICINE

## 2022-06-02 PROCEDURE — 3079F PR MOST RECENT DIASTOLIC BLOOD PRESSURE 80-89 MM HG: ICD-10-PCS | Mod: CPTII,S$GLB,, | Performed by: INTERNAL MEDICINE

## 2022-06-02 PROCEDURE — 3077F PR MOST RECENT SYSTOLIC BLOOD PRESSURE >= 140 MM HG: ICD-10-PCS | Mod: CPTII,S$GLB,, | Performed by: INTERNAL MEDICINE

## 2022-06-02 PROCEDURE — 99999 PR PBB SHADOW E&M-EST. PATIENT-LVL III: ICD-10-PCS | Mod: PBBFAC,,, | Performed by: INTERNAL MEDICINE

## 2022-06-02 PROCEDURE — 3008F PR BODY MASS INDEX (BMI) DOCUMENTED: ICD-10-PCS | Mod: CPTII,S$GLB,, | Performed by: INTERNAL MEDICINE

## 2022-06-02 PROCEDURE — 3061F NEG MICROALBUMINURIA REV: CPT | Mod: CPTII,95,, | Performed by: INTERNAL MEDICINE

## 2022-06-02 PROCEDURE — 4010F PR ACE/ARB THEARPY RXD/TAKEN: ICD-10-PCS | Mod: CPTII,95,, | Performed by: INTERNAL MEDICINE

## 2022-06-02 PROCEDURE — 1159F MED LIST DOCD IN RCRD: CPT | Mod: CPTII,S$GLB,, | Performed by: INTERNAL MEDICINE

## 2022-06-02 PROCEDURE — 3051F HG A1C>EQUAL 7.0%<8.0%: CPT | Mod: CPTII,S$GLB,, | Performed by: INTERNAL MEDICINE

## 2022-06-02 PROCEDURE — 1160F PR REVIEW ALL MEDS BY PRESCRIBER/CLIN PHARMACIST DOCUMENTED: ICD-10-PCS | Mod: CPTII,95,, | Performed by: INTERNAL MEDICINE

## 2022-06-02 PROCEDURE — 99999 PR PBB SHADOW E&M-EST. PATIENT-LVL III: CPT | Mod: PBBFAC,,, | Performed by: INTERNAL MEDICINE

## 2022-06-02 PROCEDURE — 3077F SYST BP >= 140 MM HG: CPT | Mod: CPTII,S$GLB,, | Performed by: INTERNAL MEDICINE

## 2022-06-02 PROCEDURE — 99213 PR OFFICE/OUTPT VISIT, EST, LEVL III, 20-29 MIN: ICD-10-PCS | Mod: S$GLB,,, | Performed by: INTERNAL MEDICINE

## 2022-06-02 PROCEDURE — 3051F PR MOST RECENT HEMOGLOBIN A1C LEVEL 7.0 - < 8.0%: ICD-10-PCS | Mod: CPTII,S$GLB,, | Performed by: INTERNAL MEDICINE

## 2022-06-02 PROCEDURE — 3061F NEG MICROALBUMINURIA REV: CPT | Mod: CPTII,S$GLB,, | Performed by: INTERNAL MEDICINE

## 2022-06-02 PROCEDURE — 3066F NEPHROPATHY DOC TX: CPT | Mod: CPTII,S$GLB,, | Performed by: INTERNAL MEDICINE

## 2022-06-02 PROCEDURE — 1159F MED LIST DOCD IN RCRD: CPT | Mod: CPTII,95,, | Performed by: INTERNAL MEDICINE

## 2022-06-02 PROCEDURE — 3066F NEPHROPATHY DOC TX: CPT | Mod: CPTII,95,, | Performed by: INTERNAL MEDICINE

## 2022-06-02 PROCEDURE — 3079F DIAST BP 80-89 MM HG: CPT | Mod: CPTII,S$GLB,, | Performed by: INTERNAL MEDICINE

## 2022-06-02 PROCEDURE — 3061F PR NEG MICROALBUMINURIA RESULT DOCUMENTED/REVIEW: ICD-10-PCS | Mod: CPTII,95,, | Performed by: INTERNAL MEDICINE

## 2022-06-02 NOTE — TELEPHONE ENCOUNTER
----- Message from Lizz Akins sent at 6/2/2022  9:04 AM CDT -----  Contact: [phone]  Pt called to advise that he never received a call this morning for his virtual audio appointment. Please Advise

## 2022-06-02 NOTE — ASSESSMENT & PLAN NOTE
His obesity is contributing to his overall decreased exercise tolerance in the setting of his recent illness. He is aware and has been trying to address his diet. He is unable to become more active because of his fatigue and knee pain.

## 2022-06-02 NOTE — PROGRESS NOTES
Established Patient - Audio Only Telehealth Visit     The patient location is:  [place]  The chief complaint leading to consultation is:  Upcoming knee surgery  Visit type: Virtual visit with audio only (telephone)  Total time spent with patient:  21 minutes       The reason for the audio only service rather than synchronous audio and video virtual visit was related to technical difficulties or patient preference/necessity.     Each patient to whom I provide medical services by telemedicine is:  (1) informed of the relationship between the physician and patient and the respective role of any other health care provider with respect to management of the patient; and (2) notified that they may decline to receive medical services by telemedicine and may withdraw from such care at any time. Patient verbally consented to receive this service via voice-only telephone call.       HPI:  61-year-old male with controlled type 2 diabetes and long COVID.  Patient is scheduled for elective total knee arthroplasty.  Patient had COVID January of 2022. Still having some dyspnea with activity.  He had a stress test this week that was normal.  His pulmonary follow-up later this week.     Assessment and plan:   Last seen by me may 2nd.  At that time still having significant debility.  In the interval.  Here stress test was normal.  Reports similar dyspnea symptoms with minimal activity.  Has an appointment with pulmonologist later this week.  My inclination is that we will need to delay surgery to see if we can further address dyspnea.  However, has pulmonary appointment this week and can reassess at that point in time.                   This service was not originating from a related E/M service provided within the previous 7 days nor will  to an E/M service or procedure within the next 24 hours or my soonest available appointment.  Prevailing standard of care was able to be met in this audio-only visit.

## 2022-06-02 NOTE — ASSESSMENT & PLAN NOTE
Planning for TKA soon and needs pulmonary clearance.  He is now off of oxygen and his shortness of breath is likely secondary to many factors but not related to hypoxia.   He is ASA class 2  ARISCAT risk index of 19 which places him at low risk for this surgical procedure from a pulmonary perspective.

## 2022-06-02 NOTE — ASSESSMENT & PLAN NOTE
Noted on his most recent stress echo with PASP of 31. Likely group 2 secondary to diastolic heart failure.   - recommended that he improve his diet, continue working with his PCP on his systemic BP control and would recommend a repeat echo yearly or sooner if there are changes.

## 2022-06-02 NOTE — ASSESSMENT & PLAN NOTE
Symptoms improved and have worsened again recently with allergy season  - continue claritin and flonase  - hold off on returning to work given the amount of exertion requried.   - he is no longer requiring oxygen and is much improved from previous  -  Suspect that his shortness of breath

## 2022-06-02 NOTE — ASSESSMENT & PLAN NOTE
Seems improved from our last visit. He does still get short of breath with significant exertion but recovers quickly.

## 2022-06-02 NOTE — PROGRESS NOTES
"Subjective:     Reason for visit: follow up/pre-op clearance     Patient ID:  Ned Brock is a 61 y.o. male    Interval History: Mr. Brock is doing better overall. He is no longer needing oxygen and has improved significantly with his cough. He is still having severe fatigue and weakness but feels that it is slowly improving. He is here today for evaluation prior to TKA he is planning soon. He has not had any pulmonary infections recently and is overall feeling better and has less shortness of breath. He does still get short of breath with significant exertion and walking but is able to recover quickly and has not had any associated hypoxia.       Review of Systems   Constitutional: Negative for diaphoresis, fever and malaise/fatigue.   HENT: Negative for congestion, hearing loss and nosebleeds.    Eyes: Negative for blurred vision, discharge and redness.   Respiratory: Positive for shortness of breath. Negative for cough, hemoptysis and sputum production.    Cardiovascular: Negative for chest pain, claudication, leg swelling and PND.   Gastrointestinal: Negative for abdominal pain, heartburn, nausea and vomiting.   Genitourinary: Negative.    Musculoskeletal: Positive for joint pain and myalgias. Negative for back pain and neck pain.   Skin: Negative.    Neurological: Positive for weakness. Negative for dizziness, focal weakness, seizures and loss of consciousness.   Psychiatric/Behavioral: Negative.         Objective:     Vitals:    06/02/22 1406   BP: (!) 160/86   BP Location: Left arm   Patient Position: Sitting   Pulse: 86   SpO2: 96%   Weight: 126.2 kg (278 lb 3.5 oz)   Height: 5' 10" (1.778 m)         Physical Exam  Constitutional:       General: He is not in acute distress.     Appearance: He is obese. He is not diaphoretic.   HENT:      Head: Normocephalic and atraumatic.      Right Ear: External ear normal.      Left Ear: External ear normal.   Eyes:      Conjunctiva/sclera: Conjunctivae normal.      " Pupils: Pupils are equal, round, and reactive to light.   Neck:      Trachea: No tracheal deviation.   Cardiovascular:      Rate and Rhythm: Normal rate and regular rhythm.      Heart sounds: Normal heart sounds. No murmur heard.  Pulmonary:      Effort: Pulmonary effort is normal. No respiratory distress.      Breath sounds: Normal breath sounds. No stridor. No wheezing or rales.   Abdominal:      General: Bowel sounds are normal. There is no distension.      Palpations: Abdomen is soft.      Tenderness: There is no abdominal tenderness.   Musculoskeletal:         General: Normal range of motion.      Cervical back: Normal range of motion and neck supple.   Skin:     General: Skin is warm and dry.      Findings: No erythema.   Neurological:      Mental Status: He is alert and oriented to person, place, and time.      Gait: Gait is intact.   Psychiatric:         Mood and Affect: Mood and affect normal.         Cognition and Memory: Memory normal.         Judgment: Judgment normal.              Echocardiograms:   Results for orders placed during the hospital encounter of 04/04/22    Echo    Interpretation Summary  · The left ventricle is normal in size with mild concentric hypertrophy and normal systolic function.  · The estimated ejection fraction is 60%.  · Normal right ventricular size with normal right ventricular systolic function.  · Mild to moderate left atrial enlargement.  · Mild right atrial enlargement.  · Normal central venous pressure (3 mmHg).  · The estimated PA systolic pressure is 28 mmHg.      Assessment:     1. Simple chronic bronchitis     2. SOB (shortness of breath)     3. Severe obesity (BMI >= 40)     4. YANETH (obstructive sleep apnea)     5. Pulmonary hypertension     6. Preop examination         Current Outpatient Medications   Medication Instructions    albuterol (ACCUNEB) 2.5 mg, Nebulization, Every 6 hours PRN, Rescue    ascorbic acid (VITAMIN C ORAL) 1 capsule, Oral, Daily     "ergocalciferol, vitamin D2, (VITAMIN D ORAL) 1 capsule, Oral, Daily    flash glucose scanning reader (FREESTYLE JANETH 14 DAY READER) Misc IU    flash glucose sensor (FREESTYLE JANETH 14 DAY SENSOR) Kit IU    fluticasone propionate (FLONASE) 50 mcg/actuation nasal spray 1 spray, Each Nostril, Daily PRN    guaiFENesin (MUCINEX) 1,200 mg, Oral, 2 times daily PRN    insulin detemir U-100 (LEVEMIR FLEXTOUCH) 5 Units, Subcutaneous, Nightly    lisinopriL 10 mg, Oral, Daily    metFORMIN (GLUCOPHAGE-XR) 500 mg, Oral, 2 times daily with meals    pen needle, diabetic (BD ULTRA-FINE ANDRES PEN NEEDLE) 32 gauge x 5/32" Ndle Use with insulin pens to inject insulin under the skin    pulse oximeter (PULSE OXIMETER) device Use twice daily at 8 AM and 3 PM and record the value in MyChart as directed.    ZINC ORAL 1 tablet, Oral, Daily      Ned Brock had no medications administered during this visit.     No orders of the defined types were placed in this encounter.     Plan:       Problem List Items Addressed This Visit        Pulmonary    Chronic bronchitis (Chronic)    Current Assessment & Plan     Seems improved from our last visit. He does still get short of breath with significant exertion but recovers quickly.            SOB (shortness of breath)    Current Assessment & Plan     Symptoms improved and have worsened again recently with allergy season  - continue claritin and flonase  - hold off on returning to work given the amount of exertion requried.   - he is no longer requiring oxygen and is much improved from previous  -  Suspect that his shortness of breath            Pulmonary hypertension    Current Assessment & Plan     Noted on his most recent stress echo with PASP of 31. Likely group 2 secondary to diastolic heart failure.   - recommended that he improve his diet, continue working with his PCP on his systemic BP control and would recommend a repeat echo yearly or sooner if there are changes.               " Endocrine    Severe obesity (BMI >= 40) (Chronic)    Current Assessment & Plan     His obesity is contributing to his overall decreased exercise tolerance in the setting of his recent illness. He is aware and has been trying to address his diet. He is unable to become more active because of his fatigue and knee pain.               Other    YANETH (obstructive sleep apnea)    Current Assessment & Plan     Continue nightly cpap as prescribed  Working on weight loss as prescribed             Preop examination    Current Assessment & Plan     Planning for TKA soon and needs pulmonary clearance.  He is now off of oxygen and his shortness of breath is likely secondary to many factors but not related to hypoxia.   He is ASA class 2  ARISCAT risk index of 19 which places him at low risk for this surgical procedure from a pulmonary perspective.                   Portions of the record may have been created with voice-recognition software. Occasional wrong-word or sound-a-like substitutions may have occurred due to the inherent limitations of voice-recognition software. Read the chart carefully and recognize, using context, where substitutions have occurred.    Genet Michel M.D.  Pulmonary/Critical Care

## 2022-06-03 ENCOUNTER — TELEPHONE (OUTPATIENT)
Dept: INTERNAL MEDICINE | Facility: CLINIC | Age: 62
End: 2022-06-03
Payer: COMMERCIAL

## 2022-06-03 NOTE — LETTER
Kelly 3, 2022        Sadi Montalvo MD  [address]  [address]  [place] [address]             [facility]  [address]  [facility] [address]  Phone: [phone]  Fax: [phone]   Patient: Ned Brock   MR Number: [id number]   YOB: 1960   Date of Visit: 6/3/2022     Dear Dr. Montalvo,     Received your letter for our mutual patient Ned Brock ( 1960) and upcoming elective right total knee arthroplasty.    Patient had exercise stress test 2022 which was negative for ischemia.  Patient was also recently evaluated by pulmonologist. He is ASA class 2  ARISCAT risk index of 19 which places him at low risk for this surgical procedure from a pulmonary perspective.       Patient is medically optimized for surgery.  No further risk stratification required prior surgery.  Please let me know if you have any specific questions or concerns during the perioperative period.      Sincerely,        Donaldo Willis MD            CC  No Recipients    Enclosure

## 2022-06-03 NOTE — TELEPHONE ENCOUNTER
----- Message from Lynnette Shahid sent at 6/3/2022  1:52 PM CDT -----  Contact: [phone]  Please send pre op clearance to [facility] for surgery June 7, 2022 and call patient to confirm.

## 2022-06-17 ENCOUNTER — HOSPITAL ENCOUNTER (OUTPATIENT)
Dept: RADIOLOGY | Facility: HOSPITAL | Age: 62
Discharge: HOME | End: 2022-06-17
Attending: ORTHOPAEDIC SURGERY
Payer: COMMERCIAL

## 2022-06-17 DIAGNOSIS — R60.9 EDEMA: ICD-10-CM

## 2022-06-17 PROCEDURE — 93971 EXTREMITY STUDY: CPT | Mod: 26,RT,, | Performed by: RADIOLOGY

## 2022-06-17 PROCEDURE — 93971 US LOWER EXTREMITY VEINS RIGHT: ICD-10-PCS | Mod: 26,RT,, | Performed by: RADIOLOGY

## 2022-06-17 PROCEDURE — 93971 EXTREMITY STUDY: CPT | Mod: TC,RT

## 2022-06-22 ENCOUNTER — HOSPITAL ENCOUNTER (INPATIENT)
Facility: HOSPITAL | Age: 62
LOS: 2 days | Discharge: HOME | DRG: 603 | End: 2022-06-24
Attending: EMERGENCY MEDICINE | Admitting: EMERGENCY MEDICINE
Payer: COMMERCIAL

## 2022-06-22 DIAGNOSIS — L03.115 CELLULITIS OF RIGHT LOWER EXTREMITY: Primary | ICD-10-CM

## 2022-06-22 DIAGNOSIS — Z96.651 STATUS POST TOTAL RIGHT KNEE REPLACEMENT: ICD-10-CM

## 2022-06-22 LAB
ALBUMIN SERPL BCP-MCNC: 3.8 G/DL (ref 3.5–5.2)
ALP SERPL-CCNC: 106 U/L (ref 55–135)
ALT SERPL W/O P-5'-P-CCNC: 37 U/L (ref 10–44)
ANION GAP SERPL CALC-SCNC: 10 MMOL/L (ref 8–16)
AST SERPL-CCNC: 25 U/L (ref 10–40)
BASOPHILS # BLD AUTO: 0.06 K/UL (ref 0–0.2)
BASOPHILS NFR BLD: 0.6 % (ref 0–1.9)
BILIRUB SERPL-MCNC: 0.5 MG/DL (ref 0.1–1)
BUN SERPL-MCNC: 17 MG/DL (ref 6–20)
CALCIUM SERPL-MCNC: 9.7 MG/DL (ref 8.7–10.5)
CHLORIDE SERPL-SCNC: 97 MMOL/L (ref 95–110)
CO2 SERPL-SCNC: 30 MMOL/L (ref 23–29)
CREAT SERPL-MCNC: 0.8 MG/DL (ref 0.5–1.4)
CRP SERPL-MCNC: 50.7 MG/L (ref 0–8.2)
CTP QC/QA: YES
DIFFERENTIAL METHOD: ABNORMAL
EOSINOPHIL # BLD AUTO: 0.3 K/UL (ref 0–0.5)
EOSINOPHIL NFR BLD: 3.2 % (ref 0–8)
ERYTHROCYTE [DISTWIDTH] IN BLOOD BY AUTOMATED COUNT: 13.9 % (ref 11.5–14.5)
ERYTHROCYTE [SEDIMENTATION RATE] IN BLOOD BY WESTERGREN METHOD: 44 MM/HR (ref 0–10)
EST. GFR  (AFRICAN AMERICAN): >60 ML/MIN/1.73 M^2
EST. GFR  (NON AFRICAN AMERICAN): >60 ML/MIN/1.73 M^2
GLUCOSE SERPL-MCNC: 121 MG/DL (ref 70–110)
HCT VFR BLD AUTO: 40.3 % (ref 40–54)
HGB BLD-MCNC: 12.9 G/DL (ref 14–18)
IMM GRANULOCYTES # BLD AUTO: 0.05 K/UL (ref 0–0.04)
IMM GRANULOCYTES NFR BLD AUTO: 0.5 % (ref 0–0.5)
LYMPHOCYTES # BLD AUTO: 1.8 K/UL (ref 1–4.8)
LYMPHOCYTES NFR BLD: 19 % (ref 18–48)
MCH RBC QN AUTO: 27.6 PG (ref 27–31)
MCHC RBC AUTO-ENTMCNC: 32 G/DL (ref 32–36)
MCV RBC AUTO: 86 FL (ref 82–98)
MONOCYTES # BLD AUTO: 0.7 K/UL (ref 0.3–1)
MONOCYTES NFR BLD: 7.2 % (ref 4–15)
NEUTROPHILS # BLD AUTO: 6.7 K/UL (ref 1.8–7.7)
NEUTROPHILS NFR BLD: 69.5 % (ref 38–73)
NRBC BLD-RTO: 0 /100 WBC
PLATELET # BLD AUTO: 525 K/UL (ref 150–450)
PMV BLD AUTO: 8.9 FL (ref 9.2–12.9)
POCT GLUCOSE: 137 MG/DL (ref 70–110)
POCT GLUCOSE: 139 MG/DL (ref 70–110)
POTASSIUM SERPL-SCNC: 4.4 MMOL/L (ref 3.5–5.1)
PROT SERPL-MCNC: 8.2 G/DL (ref 6–8.4)
RBC # BLD AUTO: 4.67 M/UL (ref 4.6–6.2)
SARS-COV-2 RDRP RESP QL NAA+PROBE: NEGATIVE
SODIUM SERPL-SCNC: 137 MMOL/L (ref 136–145)
WBC # BLD AUTO: 9.69 K/UL (ref 3.9–12.7)

## 2022-06-22 PROCEDURE — 87040 BLOOD CULTURE FOR BACTERIA: CPT | Mod: 59 | Performed by: PHYSICIAN ASSISTANT

## 2022-06-22 PROCEDURE — 25000003 PHARM REV CODE 250: Performed by: EMERGENCY MEDICINE

## 2022-06-22 PROCEDURE — 85652 RBC SED RATE AUTOMATED: CPT | Performed by: PHYSICIAN ASSISTANT

## 2022-06-22 PROCEDURE — 86140 C-REACTIVE PROTEIN: CPT | Performed by: PHYSICIAN ASSISTANT

## 2022-06-22 PROCEDURE — 87070 CULTURE OTHR SPECIMN AEROBIC: CPT | Performed by: PHYSICIAN ASSISTANT

## 2022-06-22 PROCEDURE — 99285 EMERGENCY DEPT VISIT HI MDM: CPT | Mod: 25

## 2022-06-22 PROCEDURE — 11000001 HC ACUTE MED/SURG PRIVATE ROOM

## 2022-06-22 PROCEDURE — 85025 COMPLETE CBC W/AUTO DIFF WBC: CPT | Performed by: PHYSICIAN ASSISTANT

## 2022-06-22 PROCEDURE — 63600175 PHARM REV CODE 636 W HCPCS: Performed by: EMERGENCY MEDICINE

## 2022-06-22 PROCEDURE — U0002 COVID-19 LAB TEST NON-CDC: HCPCS | Performed by: PHYSICIAN ASSISTANT

## 2022-06-22 PROCEDURE — 25000003 PHARM REV CODE 250: Performed by: PHYSICIAN ASSISTANT

## 2022-06-22 PROCEDURE — 87205 SMEAR GRAM STAIN: CPT | Performed by: PHYSICIAN ASSISTANT

## 2022-06-22 PROCEDURE — 80053 COMPREHEN METABOLIC PANEL: CPT | Performed by: PHYSICIAN ASSISTANT

## 2022-06-22 RX ORDER — SULFAMETHOXAZOLE AND TRIMETHOPRIM 800; 160 MG/1; MG/1
1 TABLET ORAL 2 TIMES DAILY
COMMUNITY
Start: 2022-06-17 | End: 2022-07-12

## 2022-06-22 RX ORDER — OXYCODONE AND ACETAMINOPHEN 10; 325 MG/1; MG/1
1 TABLET ORAL 3 TIMES DAILY PRN
COMMUNITY
Start: 2022-06-19 | End: 2022-10-21

## 2022-06-22 RX ORDER — OXYCODONE AND ACETAMINOPHEN 10; 325 MG/1; MG/1
1 TABLET ORAL EVERY 8 HOURS PRN
Status: DISCONTINUED | OUTPATIENT
Start: 2022-06-22 | End: 2022-06-24 | Stop reason: HOSPADM

## 2022-06-22 RX ORDER — PROMETHAZINE HYDROCHLORIDE AND CODEINE PHOSPHATE 6.25; 1 MG/5ML; MG/5ML
5 SOLUTION ORAL EVERY 6 HOURS PRN
COMMUNITY
Start: 2022-06-17 | End: 2022-10-21

## 2022-06-22 RX ADMIN — SODIUM CHLORIDE 1000 ML: 0.9 INJECTION, SOLUTION INTRAVENOUS at 01:06

## 2022-06-22 RX ADMIN — OXYCODONE AND ACETAMINOPHEN 1 TABLET: 10; 325 TABLET ORAL at 06:06

## 2022-06-22 RX ADMIN — VANCOMYCIN HYDROCHLORIDE 2500 MG: 1.25 INJECTION, POWDER, LYOPHILIZED, FOR SOLUTION INTRAVENOUS at 01:06

## 2022-06-22 NOTE — PHARMACY MED REC
"Admission Medication History     The home medication history was taken by Shaye Cherry CPhT.    You may go to "Admission" then "Reconcile Home Medications" tabs to review and/or act upon these items.      The home medication list has been updated by the Pharmacy department.    Please read ALL comments highlighted in yellow.    Please address this information as you see fit.     Feel free to contact us if you have any questions or require assistance.      The medications listed below were removed from the home medication list. Please reorder if appropriate:  Patient reports no longer taking the following medication(s):   Flash glucose   mucinex   Levemir      Medications listed below were obtained from: Patient/family and Analytic software- [employer]  (Not in a hospital admission)          hSaye Cherry CPhT.  [phone]                    .          "

## 2022-06-22 NOTE — NURSING
Patient arrived to the floor in  via transport. Wife at side with walker. Oriented patient to room and call light. Right knee sutures intact with 2 band aids on inner knee from earlier doctor appointment. RLE from knee to ankle red, warm, and swollen. Complaint of pain 5/10 PRN pain medication admin without difficulty.       [facility]  Nurses Note -- 4 Eyes        Date: 06/22/2022        Skin assessed on: 06/22/2022        [] No Pressure Injuries Present                 []Prevention Measures Documented     [x] Yes LDA Previously documented (incision to right knee)     [] Yes New Pressure Injury Discovered              [] LDA Added        Attending RN: Rona Muse RN     Second LPN: Audelia Somers LPN

## 2022-06-22 NOTE — ED PROVIDER NOTES
Encounter Date: 6/22/2022       History     Chief Complaint   Patient presents with    Wound Infection     Pt was sent from [facility] and [facility] clinic for cellulitis. Reports right knee surgery on 6/7. Redness and swelling noted to right knee around incision site.       Chief Complaint:  Wound infection    HPI:    61-year-old male with history of hypertension, diabetes status post TKR on 6/7/22 (Dr. Berrios)  sent [facility] for failed outpatient therapy of cellulitis.  Noticed redness and swelling to the right lower extremity DVT following surgery.  Ultrasound performed on 06/17, 5 days ago was negative for DVT.  He was started on Bactrim at that time with which patient has been compliant with no improvement of his symptoms.  He was evaluated clinic prior to arrival by Dr. Bahena who spoke with Dr. Case regarding pt. Recommends admission for IV Vancomycin. Joint aspirated prior to arrival by ortho. Sent for gram stain, WBC, and culture per recs. Denies, fever, chills, weakness, paresthesias, nausea, vomiting.           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    RIGHT, S/P SURGERGY ON 6/7/22    Obese abdomen     YANETH on CPAP     Pneumonia due to COVID-19 virus 01/12/2022    Type 2 diabetes mellitus without complication, without long-term current use of insulin      Past Surgical History:   Procedure Laterality Date    CHOLECYSTECTOMY  06/07/2017    CYST REMOVAL      EYE SURGERY Bilateral     LASIX    JOINT REPLACEMENT Right 06/07/2022     Family History   Problem Relation Age of Onset    Breast cancer Mother      Social History     Tobacco Use    Smoking status: Never Smoker    Smokeless tobacco:  Never Used   Substance Use Topics    Alcohol use: No    Drug use: No     Review of Systems   Constitutional: Negative for chills and fever.   HENT: Negative for congestion, ear pain, rhinorrhea and sore throat.    Eyes: Negative for redness.   Respiratory: Negative for shortness of breath and stridor.    Cardiovascular: Negative for chest pain.   Gastrointestinal: Negative for abdominal pain, constipation, diarrhea, nausea and vomiting.   Genitourinary: Negative for dysuria, frequency, hematuria and urgency.   Musculoskeletal: Positive for arthralgias. Negative for back pain and neck pain.   Skin: Positive for color change and wound. Negative for rash.   Neurological: Negative for dizziness, speech difficulty, weakness, light-headedness and numbness.   Hematological: Does not bruise/bleed easily.   Psychiatric/Behavioral: Negative for confusion.       Physical Exam     Initial Vitals [06/22/22 1236]   BP Pulse Resp Temp SpO2   139/65 89 18 97.9 °F (36.6 °C) 95 %      MAP       --         Physical Exam    Nursing note and vitals reviewed.  Constitutional: He appears well-developed and well-nourished.  Non-toxic appearance. He does not have a sickly appearance. No distress.   HENT:   Head: Normocephalic.   Right Ear: External ear normal.   Left Ear: External ear normal.   Mouth/Throat: No trismus in the jaw.   Eyes: Conjunctivae and EOM are normal.   Neck: No tracheal deviation present.   Normal range of motion.  Cardiovascular: Normal rate.   Pulses:       Dorsalis pedis pulses are 2+ on the right side and 2+ on the left side.   Pulmonary/Chest: No tachypnea and no bradypnea. No respiratory distress.   Musculoskeletal:         General: Normal range of motion.      Cervical back: Normal range of motion.      Comments: Pt is able to sit and stand and ambulate with walker     Neurological: He is alert and oriented to person, place, and time. GCS eye subscore is 4. GCS verbal subscore is 5. GCS motor subscore is 6.    Skin: Skin is warm and dry. No rash noted.   Sutures in place to the R knee with no drainage     Swelling and erythema to the R anterior lower leg with warmth      Psychiatric: He has a normal mood and affect. His behavior is normal. Judgment and thought content normal.         ED Course   Procedures  Labs Reviewed   CBC W/ AUTO DIFFERENTIAL - Abnormal; Notable for the following components:       Result Value    Hemoglobin 12.9 (*)     Platelets 525 (*)     MPV 8.9 (*)     Immature Grans (Abs) 0.05 (*)     All other components within normal limits   COMPREHENSIVE METABOLIC PANEL - Abnormal; Notable for the following components:    CO2 30 (*)     Glucose 121 (*)     All other components within normal limits   C-REACTIVE PROTEIN - Abnormal; Notable for the following components:    CRP 50.7 (*)     All other components within normal limits   POCT GLUCOSE - Abnormal; Notable for the following components:    POCT Glucose 137 (*)     All other components within normal limits   CULTURE, BLOOD   CULTURE, BLOOD   GRAM STAIN   CULTURE, FLUID  (AEROBIC) WITH GRAM STAIN   SEDIMENTATION RATE   WBC & DIFF, BODY FLUID   SARS-COV-2 RDRP GENE   POCT GLUCOSE MONITORING CONTINUOUS          Imaging Results    None          Medications   sodium chloride 0.9% bolus 1,000 mL (1,000 mLs Intravenous New Bag 6/22/22 1320)   vancomycin - pharmacy to dose (has no administration in time range)   oxyCODONE-acetaminophen  mg per tablet 1 tablet (has no administration in time range)   vancomycin (VANCOCIN) 2,500 mg in dextrose 5 % 500 mL IVPB (2,500 mg Intravenous New Bag 6/22/22 1328)   pneumoc 20-jose conj-dip cr(PF) (PREVNAR-20 (PF)) injection Syrg 0.5 mL (has no administration in time range)     Medical Decision Making:   ED Management:  61-year-old male history of HTN DM presenting sent by clinic for failed outpatient therapy for cellulitis.  Considered doubt septic joint as patient is able to ambulate.  No neurovascular deficits.   Joint fluid sent for Gram stain culture and wbc's after aspiration performed in clinic by ortho. US for DVT last week negative. Denies CP, SOB.  CBC without leukocytosis.  ESR CRP pending.  CMP pending.  Blood cultures drawn.  Vancomycin 1st dose given in the emergency department.  Percocet as needed for pain control.  Diabetic diet ordered.  Glucose 137.  Considered doubt DKA.  Patient is not septic.  Admitted in stable condition to Dr. [facility] for right lower extremity cellulitis and failed outpatient therapy.     Discussed with Dr. Case who also evaluated pt face to face and he agrees with assessment and plan.                       Clinical Impression:   Final diagnoses:  [L03.115] Cellulitis of right lower extremity (Primary)  [Z96.651] Status post total right knee replacement          ED Disposition Condition    Admit               Rissa Kovacs PA-C  06/22/22 7322

## 2022-06-22 NOTE — PROGRESS NOTES
Pharmacokinetic Initial Assessment: IV Vancomycin    Assessment/Plan:    Initiate intravenous vancomycin with loading dose of 2500 mg once followed by a maintenance dose of vancomycin 2000 mg IV every 12 hours  Desired empiric serum trough concentration is 10 to 20 mcg/mL  Draw vancomycin trough level 60 min prior to fourth dose on 6/24 at approximately 0100  Pharmacy will continue to follow and monitor vancomycin.      Please contact pharmacy at extension [phone] with any questions regarding this assessment.     Thank you for the consult,   Boniandreina Luque       Patient brief summary:  Ned Brock is a 61 y.o. male initiated on antimicrobial therapy with IV Vancomycin for treatment of suspected bone/joint infection    Drug Allergies:   Review of patient's allergies indicates:  No Known Allergies    Actual Body Weight:   122.5 kg    Renal Function:   Estimated Creatinine Clearance: 130.5 mL/min (based on SCr of 0.8 mg/dL).,     Dialysis Method (if applicable):  N/A    CBC (last 72 hours):  Recent Labs   Lab Result Units 06/22/22  1328   WBC K/uL 9.69   Hemoglobin g/dL 12.9*   Hematocrit % 40.3   Platelets K/uL 525*   Gran % % 69.5   Lymph % % 19.0   Mono % % 7.2   Eosinophil % % 3.2   Basophil % % 0.6   Differential Method  Automated       Metabolic Panel (last 72 hours):  Recent Labs   Lab Result Units 06/22/22  1328   Sodium mmol/L 137   Potassium mmol/L 4.4   Chloride mmol/L 97   CO2 mmol/L 30*   Glucose mg/dL 121*   BUN mg/dL 17   Creatinine mg/dL 0.8   Albumin g/dL 3.8   Total Bilirubin mg/dL 0.5   Alkaline Phosphatase U/L 106   AST U/L 25   ALT U/L 37       Drug levels (last 3 results):  No results for input(s): VANCOMYCINRA, VANCORANDOM, VANCOMYCINPE, VANCOPEAK, VANCOMYCINTR, VANCOTROUGH in the last 72 hours.    Microbiologic Results:  Microbiology Results (last 7 days)       Procedure Component Value Units Date/Time    Culture, Body Fluid (Aerobic) w/ GS [[id number]] Collected: 06/22/22 1130    Order  Status: Sent Specimen: Body Fluid from Knee, Right Updated: 06/22/22 1353    Gram stain [[id number]] Collected: 06/22/22 1130    Order Status: Sent Specimen: Body Fluid from Knee, Right Updated: 06/22/22 1352    Blood Culture #2 **CANNOT BE ORDERED STAT** [[id number]] Collected: 06/22/22 1328    Order Status: Sent Specimen: Blood Updated: 06/22/22 1328    Blood Culture #1 **CANNOT BE ORDERED STAT** [[id number]] Collected: 06/22/22 1328    Order Status: Sent Specimen: Blood Updated: 06/22/22 1328             Strong peripheral pulses

## 2022-06-23 PROBLEM — L03.115 CELLULITIS OF RIGHT LOWER EXTREMITY: Status: ACTIVE | Noted: 2022-06-23

## 2022-06-23 LAB — POCT GLUCOSE: 140 MG/DL (ref 70–110)

## 2022-06-23 PROCEDURE — C9399 UNCLASSIFIED DRUGS OR BIOLOG: HCPCS | Performed by: ORTHOPAEDIC SURGERY

## 2022-06-23 PROCEDURE — 63600175 PHARM REV CODE 636 W HCPCS: Performed by: EMERGENCY MEDICINE

## 2022-06-23 PROCEDURE — 25000003 PHARM REV CODE 250: Performed by: EMERGENCY MEDICINE

## 2022-06-23 PROCEDURE — 25000003 PHARM REV CODE 250: Performed by: ORTHOPAEDIC SURGERY

## 2022-06-23 PROCEDURE — 25000003 PHARM REV CODE 250: Performed by: PHYSICIAN ASSISTANT

## 2022-06-23 PROCEDURE — 11000001 HC ACUTE MED/SURG PRIVATE ROOM

## 2022-06-23 RX ORDER — IBUPROFEN 400 MG/1
800 TABLET ORAL EVERY 6 HOURS PRN
Status: DISCONTINUED | OUTPATIENT
Start: 2022-06-23 | End: 2022-06-23

## 2022-06-23 RX ORDER — IBUPROFEN 200 MG
16 TABLET ORAL
Status: DISCONTINUED | OUTPATIENT
Start: 2022-06-23 | End: 2022-06-24 | Stop reason: HOSPADM

## 2022-06-23 RX ORDER — SULFAMETHOXAZOLE AND TRIMETHOPRIM 800; 160 MG/1; MG/1
1 TABLET ORAL 2 TIMES DAILY
Status: DISCONTINUED | OUTPATIENT
Start: 2022-06-23 | End: 2022-06-24 | Stop reason: HOSPADM

## 2022-06-23 RX ORDER — LISINOPRIL 5 MG/1
10 TABLET ORAL DAILY
Status: DISCONTINUED | OUTPATIENT
Start: 2022-06-23 | End: 2022-06-24 | Stop reason: HOSPADM

## 2022-06-23 RX ORDER — CELECOXIB 100 MG/1
400 CAPSULE ORAL ONCE
Status: COMPLETED | OUTPATIENT
Start: 2022-06-23 | End: 2022-06-23

## 2022-06-23 RX ORDER — METFORMIN HYDROCHLORIDE 500 MG/1
500 TABLET, EXTENDED RELEASE ORAL 2 TIMES DAILY WITH MEALS
Status: DISCONTINUED | OUTPATIENT
Start: 2022-06-23 | End: 2022-06-24 | Stop reason: HOSPADM

## 2022-06-23 RX ORDER — GLUCAGON 1 MG
1 KIT INJECTION
Status: DISCONTINUED | OUTPATIENT
Start: 2022-06-23 | End: 2022-06-24 | Stop reason: HOSPADM

## 2022-06-23 RX ORDER — IBUPROFEN 200 MG
24 TABLET ORAL
Status: DISCONTINUED | OUTPATIENT
Start: 2022-06-23 | End: 2022-06-24 | Stop reason: HOSPADM

## 2022-06-23 RX ADMIN — INSULIN DETEMIR 5 UNITS: 100 INJECTION, SOLUTION SUBCUTANEOUS at 10:06

## 2022-06-23 RX ADMIN — OXYCODONE AND ACETAMINOPHEN 1 TABLET: 10; 325 TABLET ORAL at 02:06

## 2022-06-23 RX ADMIN — IBUPROFEN 800 MG: 400 TABLET ORAL at 04:06

## 2022-06-23 RX ADMIN — METFORMIN HYDROCHLORIDE 500 MG: 500 TABLET, EXTENDED RELEASE ORAL at 05:06

## 2022-06-23 RX ADMIN — LISINOPRIL 10 MG: 5 TABLET ORAL at 03:06

## 2022-06-23 RX ADMIN — SULFAMETHOXAZOLE AND TRIMETHOPRIM 1 TABLET: 800; 160 TABLET ORAL at 09:06

## 2022-06-23 RX ADMIN — VANCOMYCIN HYDROCHLORIDE 2000 MG: 500 INJECTION, POWDER, LYOPHILIZED, FOR SOLUTION INTRAVENOUS at 02:06

## 2022-06-23 RX ADMIN — CELECOXIB 400 MG: 100 CAPSULE ORAL at 03:06

## 2022-06-23 NOTE — PROGRESS NOTES
Vancomycin consult follow-up:    Patient reviewed, renal function stable, no new levels, continue current therapy; Next levels due: trough due 6/24/2022 at 0100

## 2022-06-23 NOTE — H&P
61-year-old diabetic male who underwent right total knee replacement 2 weeks ago by Dr. Montalvo has had increased swelling in his right ankle area with erythema.  He was seen in clinic yesterday by Dr. Lam Bahena who attempted aspiration of the knee and only obtained 2 cc of blood.  This was sent for culture by Dr. Bahena.  Dr. Bahena felt since he is diabetic and 2 weeks postop he should undergo admission for cellulitis and IV antibiotics.    Past medical history diabetes, multiple medications.    Patient alert and oriented.  Afebrile.  Right knee incision clean and dry with no erythema.  Mild diffuse swelling about the knee normal 2 weeks postop total knee replacement.  Patient has trace pitting edema in both lower extremities with mild erythema around the right ankle.  He has tenderness in the right distal tibia area.  Skin is intact.  Right total knee has 0-80 degree range of motion pain-free.    WBC normal  ESR and CRP mildly elevated, this could be postoperative or due to cellulitis or the joint infection.    Cultures from Dr. Bahena aspiration pending    Patient reports great improvement over the last 24 hours with elevation and IV antibiotics.  Much less erythema and tenderness in the distal shin area of the right knee.  The right total knee replacement does not appear infected at all.    Patient has cellulitis of the right lower extremity with diffuse swelling.  Ultrasound recently normal and negative for DVT.    Continue IV antibiotics 24 more hours.  Check CBC and sed rate in the morning.  Probable discharge tomorrow with oral antibiotics.

## 2022-06-23 NOTE — PLAN OF CARE
[facility] - Med Surg  Initial Discharge Assessment       Primary Care Provider: Donaldo Willis MD    Admission Diagnosis: Cellulitis of right lower extremity [L03.115]  Status post total right knee replacement [Z96.651]    Admission Date: 6/22/2022  Expected Discharge Date:     Discharge Barriers Identified: None    Payor: BLUE CROSS BLUE SHIELD / Plan: BCBS ALL OUT OF STATE / Product Type: PPO /     Extended Emergency Contact Information  Primary Emergency Contact: Kay Brock  Address: [address] [place] [address] [place]  Mobile Phone: [phone]  Relation: Spouse    Discharge Plan A: Home with family  Discharge Plan B: Home with family      [facility] [address] [facility] [place] [address] [facility]  [address] [facility] [address]  Phone: [phone] Fax: [phone]    [facility]  [address] [facility] [address]  Phone: [phone] Fax: [phone]    [facility] [address] [place], [place] - [address] [facility]  [address] [facility] [address]  Phone: [phone] Fax: [phone]      Initial Assessment (most recent)     Adult Discharge Assessment - 06/23/22 0915        Discharge Assessment    Assessment Type Discharge Planning Assessment     Confirmed/corrected address, phone number and insurance Yes     Confirmed Demographics Correct on Facesheet     Source of Information patient;health record     When was your last doctors appointment? 06/02/22     Communicated ARSALAN with patient/caregiver Date not available/Unable to determine     Reason For Admission Cellulitis of lower right leg     Lives With spouse;child(paul), adult     Facility Arrived From: Home     Do you expect to return to your current living situation? Yes     Do you have help at home or someone to help you manage your care at home? Yes     Who are your caregiver(s) and their phone number(s)? spouse, Kay Brock ([phone])     Prior to hospitilization  cognitive status: Alert/Oriented     Current cognitive status: Alert/Oriented     Equipment Currently Used at Home CPAP;glucometer;walker, rolling     Readmission within 30 days? No     Patient currently being followed by outpatient case management? No     Do you currently have service(s) that help you manage your care at home? No     Do you take prescription medications? Yes     Do you have prescription coverage? Yes     Coverage BCBS     Do you have any problems affording any of your prescribed medications? No     Is the patient taking medications as prescribed? yes     Who is going to help you get home at discharge? spouse, Kay Brock ([phone]), & adult son     How do you get to doctors appointments? family or friend will provide     Are you on dialysis? No     Do you take coumadin? No     Discharge Plan A Home with family     Discharge Plan B Home with family     DME Needed Upon Discharge  none     Discharge Plan discussed with: Patient     Discharge Barriers Identified None        Relationship/Environment    Name(s) of Who Lives With Patient spouse, Kay Brock ([phone]), & adult son

## 2022-06-24 VITALS
TEMPERATURE: 98 F | OXYGEN SATURATION: 96 % | SYSTOLIC BLOOD PRESSURE: 142 MMHG | RESPIRATION RATE: 15 BRPM | HEART RATE: 88 BPM | HEIGHT: 70 IN | DIASTOLIC BLOOD PRESSURE: 76 MMHG | BODY MASS INDEX: 39.45 KG/M2 | WEIGHT: 275.56 LBS

## 2022-06-24 LAB
BASOPHILS # BLD AUTO: 0.06 K/UL (ref 0–0.2)
BASOPHILS NFR BLD: 0.6 % (ref 0–1.9)
CRP SERPL-MCNC: 22.5 MG/L (ref 0–8.2)
DIFFERENTIAL METHOD: ABNORMAL
EOSINOPHIL # BLD AUTO: 0.4 K/UL (ref 0–0.5)
EOSINOPHIL NFR BLD: 4.2 % (ref 0–8)
ERYTHROCYTE [DISTWIDTH] IN BLOOD BY AUTOMATED COUNT: 13.8 % (ref 11.5–14.5)
ERYTHROCYTE [SEDIMENTATION RATE] IN BLOOD BY WESTERGREN METHOD: 46 MM/HR (ref 0–10)
HCT VFR BLD AUTO: 39.6 % (ref 40–54)
HGB BLD-MCNC: 12.5 G/DL (ref 14–18)
IMM GRANULOCYTES # BLD AUTO: 0.03 K/UL (ref 0–0.04)
IMM GRANULOCYTES NFR BLD AUTO: 0.3 % (ref 0–0.5)
LYMPHOCYTES # BLD AUTO: 2.3 K/UL (ref 1–4.8)
LYMPHOCYTES NFR BLD: 24.5 % (ref 18–48)
MCH RBC QN AUTO: 27.2 PG (ref 27–31)
MCHC RBC AUTO-ENTMCNC: 31.6 G/DL (ref 32–36)
MCV RBC AUTO: 86 FL (ref 82–98)
MONOCYTES # BLD AUTO: 0.7 K/UL (ref 0.3–1)
MONOCYTES NFR BLD: 7.7 % (ref 4–15)
NEUTROPHILS # BLD AUTO: 5.9 K/UL (ref 1.8–7.7)
NEUTROPHILS NFR BLD: 62.7 % (ref 38–73)
NRBC BLD-RTO: 0 /100 WBC
PLATELET # BLD AUTO: 501 K/UL (ref 150–450)
PMV BLD AUTO: 8.6 FL (ref 9.2–12.9)
POCT GLUCOSE: 111 MG/DL (ref 70–110)
RBC # BLD AUTO: 4.6 M/UL (ref 4.6–6.2)
VANCOMYCIN TROUGH SERPL-MCNC: 11.9 UG/ML (ref 10–22)
WBC # BLD AUTO: 9.46 K/UL (ref 3.9–12.7)

## 2022-06-24 PROCEDURE — 90677 PCV20 VACCINE IM: CPT | Performed by: ORTHOPAEDIC SURGERY

## 2022-06-24 PROCEDURE — 80202 ASSAY OF VANCOMYCIN: CPT | Performed by: ORTHOPAEDIC SURGERY

## 2022-06-24 PROCEDURE — 86140 C-REACTIVE PROTEIN: CPT | Performed by: ORTHOPAEDIC SURGERY

## 2022-06-24 PROCEDURE — 25000003 PHARM REV CODE 250: Performed by: ORTHOPAEDIC SURGERY

## 2022-06-24 PROCEDURE — 90471 IMMUNIZATION ADMIN: CPT | Performed by: ORTHOPAEDIC SURGERY

## 2022-06-24 PROCEDURE — 85652 RBC SED RATE AUTOMATED: CPT | Performed by: ORTHOPAEDIC SURGERY

## 2022-06-24 PROCEDURE — 85025 COMPLETE CBC W/AUTO DIFF WBC: CPT | Performed by: ORTHOPAEDIC SURGERY

## 2022-06-24 PROCEDURE — 63600175 PHARM REV CODE 636 W HCPCS: Performed by: ORTHOPAEDIC SURGERY

## 2022-06-24 PROCEDURE — 36415 COLL VENOUS BLD VENIPUNCTURE: CPT | Performed by: ORTHOPAEDIC SURGERY

## 2022-06-24 RX ADMIN — LISINOPRIL 10 MG: 5 TABLET ORAL at 08:06

## 2022-06-24 RX ADMIN — VANCOMYCIN HYDROCHLORIDE 2000 MG: 500 INJECTION, POWDER, LYOPHILIZED, FOR SOLUTION INTRAVENOUS at 03:06

## 2022-06-24 RX ADMIN — OXYCODONE AND ACETAMINOPHEN 1 TABLET: 10; 325 TABLET ORAL at 07:06

## 2022-06-24 RX ADMIN — SULFAMETHOXAZOLE AND TRIMETHOPRIM 1 TABLET: 800; 160 TABLET ORAL at 08:06

## 2022-06-24 RX ADMIN — PNEUMOCOCCAL 20-VALENT CONJUGATE VACCINE 0.5 ML
2.2; 2.2; 2.2; 2.2; 2.2; 2.2; 2.2; 2.2; 2.2; 2.2; 2.2; 2.2; 2.2; 2.2; 2.2; 2.2; 4.4; 2.2; 2.2; 2.2 INJECTION, SUSPENSION INTRAMUSCULAR at 11:06

## 2022-06-24 RX ADMIN — METFORMIN HYDROCHLORIDE 500 MG: 500 TABLET, EXTENDED RELEASE ORAL at 08:06

## 2022-06-24 NOTE — PLAN OF CARE
[facility]  Discharge Final Note    Primary Care Provider: Donaldo Willis MD    Expected Discharge Date: 6/24/2022    Final Discharge Note (most recent)     Final Note - 06/24/22 1128        Final Note    Assessment Type Final Discharge Note     Anticipated Discharge Disposition Home or Self Care     What phone number can be called within the next 1-3 days to see how you are doing after discharge? [phone]     Hospital Resources/Appts/Education Provided Provided patient/caregiver with written discharge plan information;Appointments scheduled by Navigator/Coordinator        Post-Acute Status    Coverage BCBS     Discharge Delays None known at this time                 Contact Info     Sadi Montalvo MD   Specialty: Orthopedic Surgery    [address] [facility]  [address]  [place] [address]   Phone: [phone]       Next Steps: Go on 6/27/2022    Instructions: Appointment scheduled for 11:15am        Hospital follow up appt made with Dr. Willis for PCP on 7/12/2022.    Pt's family to transport home, but not until after 6pm today.  Pt has been switched to oral antibiotics, Pt has a pulse ox at home and there are not other post acute needs.     Pt is cleared for discharge from a case management standpoint.     DANIEL Sanders, LMSW [facility]  [id number]

## 2022-06-24 NOTE — NURSING
Pt awake and alert during discharge instructions. Spoke with Dr. Tucker regarding his progress note to D/C pt on ABX and pain medication, MD reports pt informed him that he had pain meds and ABX at home, therefore none ordered for  D/C. Pt aware of follow up appointments and the importance of keeping them. No distress noted, pt denies any pain. PIV removed from RAC, site without pain, redness, drainage or swelling. All questions and concerns addressed. Pt escorted via wheelchair, by Jennifer to personal vehicle driven by family.

## 2022-06-24 NOTE — DISCHARGE SUMMARY
".Physician Discharge Summary     Patient ID:  Ned Brock  [id number]  61 y.o.  1960    Admit date: 6/22/2022    Discharge date and time: No discharge date for patient encounter.     Admitting Physician: Timothy Case Jr., MD     Admission Diagnoses: Cellulitis of right lower extremity [L03.115]  Status post total right knee replacement [Z96.651]    Discharge Diagnoses: same    Admission Condition: good    Discharged Condition: good    Discharge Exam:  BP (!) 142/76 (BP Location: Left arm, Patient Position: Lying)   Pulse 88   Temp 98.4 °F (36.9 °C) (Oral)   Resp 15   Ht 5' 10" (1.778 m)   Wt 125 kg (275 lb 9.2 oz)   SpO2 96%   BMI 39.54 kg/m²     General Appearance:    Alert, cooperative, no distress, appears stated age   Head:    Normocephalic, without obvious abnormality, atraumatic   Eyes:    PERRL, conjunctiva/corneas clear, EOM's intact, fundi     benign, both eyes        Ears:    Normal TM's and external ear canals, both ears   Nose:   Nares normal, septum midline, mucosa normal, no drainage    or sinus tenderness   Throat:   Lips, mucosa, and tongue normal; teeth and gums normal   Neck:   Supple, symmetrical, trachea midline, no adenopathy;        thyroid:  No enlargement/tenderness/nodules; no carotid    bruit or JVD   Back:     Symmetric, no curvature, ROM normal, no CVA tenderness   Lungs:     Clear to auscultation bilaterally, respirations unlabored   Chest wall:    No tenderness or deformity   Heart:    Regular rate and rhythm, S1 and S2 normal, no murmur, rub   or gallop   Abdomen:     Soft, non-tender, bowel sounds active all four quadrants,     no masses, no organomegaly   Genitalia:    Normal male without lesion, discharge or tenderness   Rectal:    Normal tone, normal prostate, no masses or tenderness;    guaiac negative stool   Extremities:   Extremities normal, atraumatic, no cyanosis or edema   Pulses:   2+ and symmetric all extremities   Skin:   Skin color, texture, turgor " normal, no rashes or lesions   Lymph nodes:   Cervical, supraclavicular, and axillary nodes normal   Neurologic:   CNII-XII intact. Normal strength, sensation and reflexes       throughout       Disposition: Home or Self Care    Patient Instructions:   Current Discharge Medication List      CONTINUE these medications which have NOT CHANGED    Details   albuterol (ACCUNEB) 0.63 mg/3 mL Nebu Take 2.5 mg by nebulization every 6 (six) hours as needed (wheezing/shortness of breath). Rescue      ascorbic acid (VITAMIN C ORAL) Take 1 capsule by mouth once daily.      ergocalciferol, vitamin D2, (VITAMIN D ORAL) Take 1 capsule by mouth once daily.      fluticasone propionate (FLONASE) 50 mcg/actuation nasal spray 1 spray by Each Nostril route daily as needed for Rhinitis.      guaiFENesin (MUCINEX) 600 mg 12 hr tablet Take 1,200 mg by mouth 2 (two) times daily as needed for Congestion.      lisinopriL 10 MG tablet Take 1 tablet (10 mg total) by mouth once daily.  Qty: 90 tablet, Refills: 3    Associated Diagnoses: Essential hypertension, benign      metFORMIN (GLUCOPHAGE-XR) 500 MG ER 24hr tablet Take 1 tablet (500 mg total) by mouth 2 (two) times daily with meals.  Qty: 180 tablet, Refills: 3    Associated Diagnoses: Type 2 diabetes mellitus with other specified complication, without long-term current use of insulin      ZINC ORAL Take 1 tablet by mouth once daily.      flash glucose sensor (FREESTYLE JANETH 14 DAY SENSOR) Kit IU  Qty: 1 kit, Refills: 11    Associated Diagnoses: Type 2 diabetes mellitus without complication, without long-term current use of insulin      insulin detemir U-100 (LEVEMIR FLEXTOUCH) 100 unit/mL (3 mL) SubQ InPn pen Inject 5 Units into the skin every evening.  Qty: 3 mL, Refills: 1      oxyCODONE-acetaminophen (PERCOCET)  mg per tablet Take 1 tablet by mouth 3 (three) times daily as needed.      promethazine-codeine 6.25-10 mg/5 ml (PHENERGAN WITH CODEINE) 6.25-10 mg/5 mL syrup Take 5 mLs  by mouth every 6 (six) hours as needed.      pulse oximeter (PULSE OXIMETER) device Use twice daily at 8 AM and 3 PM and record the value in [employer] as directed.  Qty: 1 each, Refills: 0      sulfamethoxazole-trimethoprim 800-160mg (BACTRIM DS) 800-160 mg Tab Take 1 tablet by mouth 2 (two) times daily.           Activity: activity as tolerated  Diet: regular diet and diabetic diet  Wound Care: keep wound clean and dry    Follow-up with Selvin in 3 day.    Signed:  Brenton Tucker  6/24/2022  11:46 AM

## 2022-06-24 NOTE — PROGRESS NOTES
Pt much improved. Erythema and swelling resolved    Plan D/C with oral Ab and pain meds  RTC 3d Dr Montalvo

## 2022-06-24 NOTE — PROGRESS NOTES
Pharmacokinetic Assessment Follow Up: IV Vancomycin    Vancomycin serum concentration assessment(s):    The trough level was drawn correctly and can be used to guide therapy at this time. The measurement is within the desired definitive target range of 10 to 20 mcg/mL.    Vancomycin Regimen Plan:    Continue regimen to Vancomycin 2000 mg IV every 12 hours with next serum trough concentration measured at 0300 prior to 4th dose on 6/26/22    Drug levels (last 3 results):  Recent Labs   Lab Result Units 06/24/22  0057   Vancomycin-Trough ug/mL 11.9       Pharmacy will continue to follow and monitor vancomycin.    Please contact pharmacy at extension [phone] for questions regarding this assessment.    Thank you for the consult,   Weston Lee       Patient brief summary:  Ned Brock is a 61 y.o. male initiated on antimicrobial therapy with IV Vancomycin for treatment of bone/joint infection    The patient's current regimen is Vancomycin 2000 mg q12h    Drug Allergies:   Review of patient's allergies indicates:  No Known Allergies    Actual Body Weight:   125 kg    Renal Function:   Estimated Creatinine Clearance: 131.9 mL/min (based on SCr of 0.8 mg/dL).,     Dialysis Method (if applicable):  N/A    CBC (last 72 hours):  Recent Labs   Lab Result Units 06/22/22  1328   WBC K/uL 9.69   Hemoglobin g/dL 12.9*   Hematocrit % 40.3   Platelets K/uL 525*   Gran % % 69.5   Lymph % % 19.0   Mono % % 7.2   Eosinophil % % 3.2   Basophil % % 0.6   Differential Method  Automated       Metabolic Panel (last 72 hours):  Recent Labs   Lab Result Units 06/22/22  1328   Sodium mmol/L 137   Potassium mmol/L 4.4   Chloride mmol/L 97   CO2 mmol/L 30*   Glucose mg/dL 121*   BUN mg/dL 17   Creatinine mg/dL 0.8   Albumin g/dL 3.8   Total Bilirubin mg/dL 0.5   Alkaline Phosphatase U/L 106   AST U/L 25   ALT U/L 37       Vancomycin Administrations:  vancomycin given in the last 96 hours                     vancomycin (VANCOCIN) 2,000 mg  in dextrose 5 % 500 mL IVPB (mg) 2,000 mg New Bag 06/24/22 0359     2,000 mg New Bag 06/23/22 1412     2,000 mg New Bag  0214    vancomycin (VANCOCIN) 2,500 mg in dextrose 5 % 500 mL IVPB (mg) 2,500 mg New Bag 06/22/22 1328                    Microbiologic Results:  Microbiology Results (last 7 days)       Procedure Component Value Units Date/Time    Blood Culture #1 **CANNOT BE ORDERED STAT** [[id number]] Collected: 06/22/22 1130    Order Status: Completed Specimen: Blood Updated: 06/23/22 1503     Blood Culture, Routine No Growth to date      No Growth to date    Blood Culture #2 **CANNOT BE ORDERED STAT** [[id number]] Collected: 06/22/22 1130    Order Status: Completed Specimen: Blood Updated: 06/23/22 1503     Blood Culture, Routine No Growth to date      No Growth to date    Culture, Body Fluid (Aerobic) w/ GS [[id number]] Collected: 06/22/22 1130    Order Status: Completed Specimen: Knee, Right Updated: 06/23/22 1148     AEROBIC CULTURE - FLUID No growth     Gram Stain Result Rare WBC's      No organisms seen    Gram stain [[id number]] Collected: 06/22/22 1130    Order Status: Canceled Specimen: Knee, Right

## 2022-06-26 LAB
BACTERIA BLD CULT: NORMAL
BACTERIA BLD CULT: NORMAL
BACTERIA FLD AEROBE CULT: NO GROWTH
GRAM STN SPEC: NORMAL
GRAM STN SPEC: NORMAL

## 2022-07-12 ENCOUNTER — IMMUNIZATION (OUTPATIENT)
Dept: PHARMACY | Facility: CLINIC | Age: 62
End: 2022-07-12
Payer: COMMERCIAL

## 2022-07-12 ENCOUNTER — LAB VISIT (OUTPATIENT)
Dept: LAB | Facility: HOSPITAL | Age: 62
End: 2022-07-12
Attending: INTERNAL MEDICINE
Payer: COMMERCIAL

## 2022-07-12 ENCOUNTER — OFFICE VISIT (OUTPATIENT)
Dept: INTERNAL MEDICINE | Facility: CLINIC | Age: 62
End: 2022-07-12
Payer: COMMERCIAL

## 2022-07-12 ENCOUNTER — PATIENT MESSAGE (OUTPATIENT)
Dept: PHARMACY | Facility: CLINIC | Age: 62
End: 2022-07-12

## 2022-07-12 VITALS
SYSTOLIC BLOOD PRESSURE: 150 MMHG | OXYGEN SATURATION: 96 % | WEIGHT: 264 LBS | DIASTOLIC BLOOD PRESSURE: 82 MMHG | BODY MASS INDEX: 37.8 KG/M2 | HEART RATE: 83 BPM | HEIGHT: 70 IN

## 2022-07-12 DIAGNOSIS — Z23 NEED FOR VACCINATION: Primary | ICD-10-CM

## 2022-07-12 DIAGNOSIS — Z96.651 STATUS POST TOTAL RIGHT KNEE REPLACEMENT: Primary | ICD-10-CM

## 2022-07-12 DIAGNOSIS — F43.21 ADJUSTMENT DISORDER WITH DEPRESSED MOOD: ICD-10-CM

## 2022-07-12 DIAGNOSIS — E11.9 TYPE 2 DIABETES MELLITUS WITHOUT COMPLICATION, WITHOUT LONG-TERM CURRENT USE OF INSULIN: ICD-10-CM

## 2022-07-12 DIAGNOSIS — T81.41XD INFECTION OF SUPERFICIAL INCISIONAL SURGICAL SITE AFTER PROCEDURE, SUBSEQUENT ENCOUNTER: ICD-10-CM

## 2022-07-12 DIAGNOSIS — U09.9 COVID-19 LONG HAULER: ICD-10-CM

## 2022-07-12 LAB
ESTIMATED AVG GLUCOSE: 148 MG/DL (ref 68–131)
HBA1C MFR BLD: 6.8 % (ref 4–5.6)

## 2022-07-12 PROCEDURE — 3051F HG A1C>EQUAL 7.0%<8.0%: CPT | Mod: CPTII,S$GLB,, | Performed by: INTERNAL MEDICINE

## 2022-07-12 PROCEDURE — 3051F PR MOST RECENT HEMOGLOBIN A1C LEVEL 7.0 - < 8.0%: ICD-10-PCS | Mod: CPTII,S$GLB,, | Performed by: INTERNAL MEDICINE

## 2022-07-12 PROCEDURE — 3079F DIAST BP 80-89 MM HG: CPT | Mod: CPTII,S$GLB,, | Performed by: INTERNAL MEDICINE

## 2022-07-12 PROCEDURE — 1160F PR REVIEW ALL MEDS BY PRESCRIBER/CLIN PHARMACIST DOCUMENTED: ICD-10-PCS | Mod: CPTII,S$GLB,, | Performed by: INTERNAL MEDICINE

## 2022-07-12 PROCEDURE — 3008F PR BODY MASS INDEX (BMI) DOCUMENTED: ICD-10-PCS | Mod: CPTII,S$GLB,, | Performed by: INTERNAL MEDICINE

## 2022-07-12 PROCEDURE — 3008F BODY MASS INDEX DOCD: CPT | Mod: CPTII,S$GLB,, | Performed by: INTERNAL MEDICINE

## 2022-07-12 PROCEDURE — 83036 HEMOGLOBIN GLYCOSYLATED A1C: CPT | Performed by: INTERNAL MEDICINE

## 2022-07-12 PROCEDURE — 3061F NEG MICROALBUMINURIA REV: CPT | Mod: CPTII,S$GLB,, | Performed by: INTERNAL MEDICINE

## 2022-07-12 PROCEDURE — 3077F SYST BP >= 140 MM HG: CPT | Mod: CPTII,S$GLB,, | Performed by: INTERNAL MEDICINE

## 2022-07-12 PROCEDURE — 1111F DSCHRG MED/CURRENT MED MERGE: CPT | Mod: CPTII,S$GLB,, | Performed by: INTERNAL MEDICINE

## 2022-07-12 PROCEDURE — 3066F NEPHROPATHY DOC TX: CPT | Mod: CPTII,S$GLB,, | Performed by: INTERNAL MEDICINE

## 2022-07-12 PROCEDURE — 36415 COLL VENOUS BLD VENIPUNCTURE: CPT | Performed by: INTERNAL MEDICINE

## 2022-07-12 PROCEDURE — 1160F RVW MEDS BY RX/DR IN RCRD: CPT | Mod: CPTII,S$GLB,, | Performed by: INTERNAL MEDICINE

## 2022-07-12 PROCEDURE — 4010F PR ACE/ARB THEARPY RXD/TAKEN: ICD-10-PCS | Mod: CPTII,S$GLB,, | Performed by: INTERNAL MEDICINE

## 2022-07-12 PROCEDURE — 3077F PR MOST RECENT SYSTOLIC BLOOD PRESSURE >= 140 MM HG: ICD-10-PCS | Mod: CPTII,S$GLB,, | Performed by: INTERNAL MEDICINE

## 2022-07-12 PROCEDURE — 99999 PR PBB SHADOW E&M-EST. PATIENT-LVL III: ICD-10-PCS | Mod: PBBFAC,,, | Performed by: INTERNAL MEDICINE

## 2022-07-12 PROCEDURE — 1111F PR DISCHARGE MEDS RECONCILED W/ CURRENT OUTPATIENT MED LIST: ICD-10-PCS | Mod: CPTII,S$GLB,, | Performed by: INTERNAL MEDICINE

## 2022-07-12 PROCEDURE — 99214 OFFICE O/P EST MOD 30 MIN: CPT | Mod: S$GLB,,, | Performed by: INTERNAL MEDICINE

## 2022-07-12 PROCEDURE — 4010F ACE/ARB THERAPY RXD/TAKEN: CPT | Mod: CPTII,S$GLB,, | Performed by: INTERNAL MEDICINE

## 2022-07-12 PROCEDURE — 99999 PR PBB SHADOW E&M-EST. PATIENT-LVL III: CPT | Mod: PBBFAC,,, | Performed by: INTERNAL MEDICINE

## 2022-07-12 PROCEDURE — 1159F MED LIST DOCD IN RCRD: CPT | Mod: CPTII,S$GLB,, | Performed by: INTERNAL MEDICINE

## 2022-07-12 PROCEDURE — 1159F PR MEDICATION LIST DOCUMENTED IN MEDICAL RECORD: ICD-10-PCS | Mod: CPTII,S$GLB,, | Performed by: INTERNAL MEDICINE

## 2022-07-12 PROCEDURE — 3079F PR MOST RECENT DIASTOLIC BLOOD PRESSURE 80-89 MM HG: ICD-10-PCS | Mod: CPTII,S$GLB,, | Performed by: INTERNAL MEDICINE

## 2022-07-12 PROCEDURE — 3066F PR DOCUMENTATION OF TREATMENT FOR NEPHROPATHY: ICD-10-PCS | Mod: CPTII,S$GLB,, | Performed by: INTERNAL MEDICINE

## 2022-07-12 PROCEDURE — 3061F PR NEG MICROALBUMINURIA RESULT DOCUMENTED/REVIEW: ICD-10-PCS | Mod: CPTII,S$GLB,, | Performed by: INTERNAL MEDICINE

## 2022-07-12 PROCEDURE — 99214 PR OFFICE/OUTPT VISIT, EST, LEVL IV, 30-39 MIN: ICD-10-PCS | Mod: S$GLB,,, | Performed by: INTERNAL MEDICINE

## 2022-07-12 RX ORDER — FLASH GLUCOSE SENSOR
KIT MISCELLANEOUS
Qty: 1 KIT | Refills: 11 | Status: SHIPPED | OUTPATIENT
Start: 2022-07-12 | End: 2022-10-21 | Stop reason: SDUPTHER

## 2022-07-12 RX ORDER — CITALOPRAM 20 MG/1
20 TABLET, FILM COATED ORAL DAILY
Qty: 30 TABLET | Refills: 11 | Status: SHIPPED | OUTPATIENT
Start: 2022-07-12 | End: 2022-12-09

## 2022-07-12 NOTE — PROGRESS NOTES
"    CHIEF COMPLAINT     Chief Complaint   Patient presents with    Follow-up       HPI     Ned Brock is a 61 y.o. male COVID with residual cognitive physical debility type 2 diabetes, hypertension, recent total knee replacement here today for     Underwent right TKA on 06/07/2022.  Postoperative course complicated by superficial skin infection which was treated with antibiotics.  He had some postoperative swelling ultrasound negative for DVT currently on Medrol Dosepak.  Reports that he has had a little bit of postop stiffness range of motion is not quite at goal for rehab.  Reports of Medrol space blood pressures blood sugar little bit higher for couple days.    Type 2 diabetes  Taking metformin and Levemir.  Insurance finally approved [facility]    Reports feeling down between change in functional status from COVID infection as well as difficulty with postop recovery.  Also has a grandson that has had several surgeries in medical issues.    Personally Reviewed Patient's Medical, surgical, family and social hx. Changes updated in [employer].  Care Team updated in Epic    Review of Systems:  Review of Systems   Constitutional: Negative for fatigue and fever.   Respiratory: Negative for cough and shortness of breath.    Musculoskeletal: Positive for arthralgias and joint swelling.       Health Maintenance:   Reviewed with patient  Due for the following:      PHYSICAL EXAM     BP (!) 150/82 (BP Location: Right arm, Patient Position: Sitting, BP Method: Large (Manual))   Pulse 83   Ht 5' 10" (1.778 m)   Wt 119.7 kg (264 lb)   SpO2 96%   BMI 37.88 kg/m²     Gen: Well Appearing, NAD  HEENT: PERR, EOMI  Neck: FROM, no thyromegaly, no cervical adenopathy  CVD: RRR, no M/R/G  Pulm: Normal work of breathing, CTAB, no wheezing  Abd:  Soft, NT, ND non TTP, no mass  MSK: no LE edema  Neuro: A&Ox3, gait using walker, speech normal  Mood; Mood depressed, affect tearful, behavior normal, thought process linear       LABS "     Labs reviewed;    Chemistry        Component Value Date/Time     06/22/2022 1328    K 4.4 06/22/2022 1328    CL 97 06/22/2022 1328    CO2 30 (H) 06/22/2022 1328    BUN 17 06/22/2022 1328    CREATININE 0.8 06/22/2022 1328     (H) 06/22/2022 1328        Component Value Date/Time    CALCIUM 9.7 06/22/2022 1328    ALKPHOS 106 06/22/2022 1328    AST 25 06/22/2022 1328    ALT 37 06/22/2022 1328    BILITOT 0.5 06/22/2022 1328    ESTGFRAFRICA >60 06/22/2022 1328    EGFRNONAA >60 06/22/2022 1328        Lab Results   Component Value Date    HGBA1C 7.7 (H) 04/19/2022       US; no DVT  ASSESSMENT     1. Status post total right knee replacement     2. Infection of superficial incisional surgical site after procedure, subsequent encounter     3. Type 2 diabetes mellitus without complication, without long-term current use of insulin  flash glucose sensor (FREESTYLE JANETH 14 DAY SENSOR) Kit    Hemoglobin A1C   4. Adjustment disorder with depressed mood  citalopram (CELEXA) 20 MG tablet   5. COVID-19 long hauler-physical and cognitive changes             Plan      Ned Brock is a 61 y.o. male with  COVID with residual cognitive physical debility type 2 diabetes, hypertension, recent total knee replacement   1. Status post total right knee replacement  Continue postoperative therapy  Incision appears healed, no to superficial infection    2. Infection of superficial incisional surgical site after procedure, subsequent encounter  Resolved post bactrim    3. Type 2 diabetes mellitus without complication, without long-term current use of insulin  .  Detemir insulin  Continue metformin  Start Trulicity  - flash glucose sensor (FREESTYLE JANETH 14 DAY SENSOR) Kit; IU  Dispense: 1 kit; Refill: 11  - Hemoglobin A1C; Future    4. Adjustment disorder with depressed mood  Patient going through a lot with loss of job due to COVID, recovering from knee surgery and new grandchild with health issues.  Therapeutic trial  citalopram will have follow-up with me in 6 weeks  - citalopram (CELEXA) 20 MG tablet; Take 1 tablet (20 mg total) by mouth once daily.  Dispense: 30 tablet; Refill: 11    5. COVID-19 long hauler-physical and cognitive changes  Symptoms physical debility, low stamina and brain fog  Continue physical therapy for knee hopefully will help with activation  Treat comorbid mood issues  Continue follow-up    Donaldo Willis MD

## 2022-08-29 ENCOUNTER — PATIENT MESSAGE (OUTPATIENT)
Dept: INTERNAL MEDICINE | Facility: CLINIC | Age: 62
End: 2022-08-29
Payer: COMMERCIAL

## 2022-08-30 ENCOUNTER — TELEPHONE (OUTPATIENT)
Dept: INTERNAL MEDICINE | Facility: CLINIC | Age: 62
End: 2022-08-30
Payer: COMMERCIAL

## 2022-10-17 ENCOUNTER — HOSPITAL ENCOUNTER (OUTPATIENT)
Facility: HOSPITAL | Age: 62
Discharge: HOME | End: 2022-10-17
Attending: ORTHOPAEDIC SURGERY | Admitting: ORTHOPAEDIC SURGERY
Payer: COMMERCIAL

## 2022-10-17 VITALS
SYSTOLIC BLOOD PRESSURE: 165 MMHG | DIASTOLIC BLOOD PRESSURE: 83 MMHG | WEIGHT: 264 LBS | OXYGEN SATURATION: 95 % | RESPIRATION RATE: 18 BRPM | TEMPERATURE: 98 F | HEART RATE: 76 BPM | BODY MASS INDEX: 37.88 KG/M2

## 2022-10-17 DIAGNOSIS — T84.84XA PAIN IN PROSTHETIC JOINT, INITIAL ENCOUNTER: Primary | ICD-10-CM

## 2022-10-17 LAB — POCT GLUCOSE: 113 MG/DL (ref 70–110)

## 2022-10-17 PROCEDURE — C2618 PROBE/NEEDLE, CRYO: HCPCS | Performed by: ORTHOPAEDIC SURGERY

## 2022-10-17 PROCEDURE — 82962 GLUCOSE BLOOD TEST: CPT | Performed by: ORTHOPAEDIC SURGERY

## 2022-10-17 PROCEDURE — 36000706: Performed by: ORTHOPAEDIC SURGERY

## 2022-10-17 PROCEDURE — 36000707: Performed by: ORTHOPAEDIC SURGERY

## 2022-10-17 PROCEDURE — 25000003 PHARM REV CODE 250: Performed by: ANESTHESIOLOGY

## 2022-10-17 PROCEDURE — 25000003 PHARM REV CODE 250: Performed by: ORTHOPAEDIC SURGERY

## 2022-10-17 PROCEDURE — 71000015 HC POSTOP RECOV 1ST HR: Performed by: ORTHOPAEDIC SURGERY

## 2022-10-17 PROCEDURE — 27201423 OPTIME MED/SURG SUP & DEVICES STERILE SUPPLY: Performed by: ORTHOPAEDIC SURGERY

## 2022-10-17 RX ORDER — BUPIVACAINE HYDROCHLORIDE 2.5 MG/ML
INJECTION, SOLUTION INFILTRATION; PERINEURAL
Status: DISCONTINUED | OUTPATIENT
Start: 2022-10-17 | End: 2022-10-17 | Stop reason: HOSPADM

## 2022-10-17 RX ORDER — SODIUM CHLORIDE, SODIUM LACTATE, POTASSIUM CHLORIDE, CALCIUM CHLORIDE 600; 310; 30; 20 MG/100ML; MG/100ML; MG/100ML; MG/100ML
INJECTION, SOLUTION INTRAVENOUS CONTINUOUS
Status: DISCONTINUED | OUTPATIENT
Start: 2022-10-17 | End: 2022-10-17 | Stop reason: HOSPADM

## 2022-10-17 RX ORDER — CEFAZOLIN SODIUM 2 G/50ML
2 SOLUTION INTRAVENOUS
Status: DISCONTINUED | OUTPATIENT
Start: 2022-10-17 | End: 2022-10-17 | Stop reason: HOSPADM

## 2022-10-17 RX ORDER — LIDOCAINE HYDROCHLORIDE 10 MG/ML
1 INJECTION, SOLUTION EPIDURAL; INFILTRATION; INTRACAUDAL; PERINEURAL ONCE
Status: DISCONTINUED | OUTPATIENT
Start: 2022-10-17 | End: 2022-10-17 | Stop reason: HOSPADM

## 2022-10-17 RX ORDER — LIDOCAINE HYDROCHLORIDE AND EPINEPHRINE 20; 10 MG/ML; UG/ML
INJECTION, SOLUTION INFILTRATION; PERINEURAL
Status: DISCONTINUED | OUTPATIENT
Start: 2022-10-17 | End: 2022-10-17 | Stop reason: HOSPADM

## 2022-10-17 RX ORDER — ACETAMINOPHEN 500 MG
1000 TABLET ORAL ONCE
Status: COMPLETED | OUTPATIENT
Start: 2022-10-17 | End: 2022-10-17

## 2022-10-17 RX ORDER — TRAMADOL HYDROCHLORIDE 50 MG/1
50 TABLET ORAL EVERY 6 HOURS
COMMUNITY
End: 2024-02-29

## 2022-10-17 RX ADMIN — ACETAMINOPHEN 1000 MG: 500 TABLET ORAL at 11:10

## 2022-10-17 NOTE — DISCHARGE INSTRUCTIONS
[facility]- cryoanalgesia knee pain treatment     Discharge Instructions:       -    Keep the treatment area clean and dry during healing. Showers only, NO BATHS, do not submerge area in water for at least 72 hours.   -    Use ice or over-the-counter pain medication to relieve soreness. Your physician can offer guidance on medications.   -    Unless specifically directed, avoid blood-thinning drugs (ibuprofen,aspirin) for 48 hours; acetaminophen (Tylenol) is an option.    -    Avoid exercise or exertion for 2-3 days.   -    Your physician will advise you on returning to normal activities and safe levels of exertion or stress.    You may experience normal and temporary responses to the treatment :    -   Treatment area may feel tender and/or warm.   -   Some numbness or mild tingling may be present in the treatment area.  -   Some swelling and/or bruising may occur.    DIET: You may resume your home diet.     CALL THE DOCTOR:   For any obvious bleeding.  Foul smell around sites or fever over 101.  Persistent pain not relieved by medication.    Fall Prevention  Millions of people fall every year and injure themselves. You may have had anesthesia or sedation which may increase your risk of falling. You may have health issues that put you at an increased risk of falling.     Here are ways to reduce your risk of falling.    Make your home safe by keeping walkways clear of objects you may trip over.  Use non-slip pads under rugs. Do not use area rugs or small throw rugs.  Use non-slip mats in bathtubs and showers.  Install handrails and lights on staircases.  Do not walk in poorly lit areas.  Do not stand on chairs or wobbly ladders.  Use caution when reaching overhead or looking upward. This position can cause a loss of balance.  Be sure your shoes fit properly, have non-slip bottoms and are in good condition.   Wear shoes both inside and out. Avoid going barefoot or wearing slippers.  Be cautious when going up and down  stairs, curbs, and when walking on uneven sidewalks.  If your balance is poor, consider using a cane or walker.  If your fall was related to alcohol use, stop or limit alcohol intake.   If your fall was related to use of sleeping medicines, talk to your doctor about this. You may need to reduce your dosage at bedtime if you awaken during the night to go to the bathroom.    To reduce the need for nighttime bathroom trips:  Avoid drinking fluids for several hours before going to bed  Empty your bladder before going to bed  Men can keep a urinal at the bedside  Stay as active as you can. Balance, flexibility, strength, and endurance all come from exercise. They all play a role in preventing falls. Ask your healthcare provider which types of activity are right for you.  Get your vision checked on a regular basis.  If you have pets, know where they are before you stand up or walk so you don't trip over them.  Use night lights.     If any unusual problems or difficulties occur contact your doctor. If you cannot contact your doctor but feel your signs and symptoms warrant a physicians attention please go to the nearest emergency room.

## 2022-10-17 NOTE — BRIEF OP NOTE
Right knee [facility] - Surgery  Brief Operative Note    Surgery Date: 10/17/2022     Surgeon(s) and Role:     * Sadi Montalvo MD - Primary    Assisting Surgeon: None    Pre-op Diagnosis:  Pain in prosthetic joint, initial encounter [T84.84XA]    Post-op Diagnosis:  Post-Op Diagnosis Codes:     * Pain in prosthetic joint, initial encounter [T84.84XA]    Procedure(s) (LRB):  KNEE PERIPHERAL NERVE ABLASION -IOVERA (Right)    Anesthesia: Local    Operative Findings:  Right knee pain status post total knee arthroplasty    Estimated Blood Loss: * No values recorded between 10/17/2022 12:16 PM and 10/17/2022 12:57 PM *         Specimens:   Specimen (24h ago, onward)      None              Discharge Note    OUTCOME: Patient tolerated treatment/procedure well without complication and is now ready for discharge.    DISPOSITION: Home or Self Care    FINAL DIAGNOSIS:  <principal problem not specified>    FOLLOWUP: In clinic in 2 weeks    DISCHARGE INSTRUCTIONS:  No discharge procedures on file.

## 2022-10-17 NOTE — OP NOTE
OPERATIVE REPORT    DATE OF DICTATION: 10/17/2022    DATE OF OPERATION: 10/17/2022    SURGEON: NAHOMY LEIVA M.D.    ASSISTANT:  None    Preoperative diagnosis:    Right painful total knee arthroplasty with anterior knee pain    Postop diagnosis:    Same    PROCEDURE PERFORMED:     Right knee Iovera with a placed in x4  Including the anterior femoral cutaneous nerve as well as the superior and inferior branch of the inferior patella saphenous nerve and the lateral femoral cutaneous nerve     ANESTHESIA: Local     FINDINGS:  Anterior knee pain patellofemoral chondrosis     BLOOD LOSS:  Minimal 2 cc     IMPLANTS:none     COMPLICATIONS: none        HPI: The patient is a 61 year old male with continued right knee pain status post right total knee arthroplasty.  Most the pain is located anteriorly.  He has recovered well with his physical therapy he is getting his quadriceps strength back every still has pain in the right knee.  We are attempting the IOVERA procedure today for the continued right knee pain     PROCEDURE IN DETAIL: After the appropriate consents were signed discussing possible risks and complications of the surgery including but not limited to wound healing complications such as skin breakdown and infection, as well as injuries to nerves, soft tissue, and vessels in the area of the surgery, as well as non-union or mal-union at the fracture site, as well as new fractures, as well as needing to repeat or perform future operations, limb length discrepancy, limb rotational discrepancy, and other anesthesia related complications such as heart attack, stroke, deep vein thrombosis, pulmonary embolism, and  Death. After these consents were signed and the patient was marked in the holding area, the patient was brought back to the operating room and placed under anesthesia. The patient was transferred onto the operative table and placed in the supine position. All bony prominences and  neurovascualr structures were well padded The operative limb was prepped and draped in the normal sterile standard fashion. IV antibiotics had been infused. A time out was called including the scrub tech, the circulating nurse, the anesthesia staff, and the surgical staff.       After appropriate prepping and draping of the right lower extremity we anesthatised the superior aspect of the knee including the anterior femoral cutaneous nerve as well as the lateral femoral cutaneous nerve across the superior aspect of patella within the thigh.  We then use the iovera freezing ablation device across the quadriceps ablating the anterior femoral cutaneous nerve as well as the lateral femoral cutaneous nerve..  Patient tolerated this well.     We then anesthetized the inferior medial aspect of the knee at the inferior and superior aspect of the infrapatellar saphenous nerve.  Once this was anesthetized we then used the IO vera freezing device along this aspect and froze these nerves with ablation.  The patient tolerated this as well.  We placed sterile gauze to the incisions.  Will the patient was then released.     When the patient follow-up in the office if the pain continues.

## 2022-10-21 ENCOUNTER — OFFICE VISIT (OUTPATIENT)
Dept: INTERNAL MEDICINE | Facility: CLINIC | Age: 62
End: 2022-10-21
Payer: COMMERCIAL

## 2022-10-21 ENCOUNTER — IMMUNIZATION (OUTPATIENT)
Dept: INTERNAL MEDICINE | Facility: CLINIC | Age: 62
End: 2022-10-21
Payer: COMMERCIAL

## 2022-10-21 ENCOUNTER — TELEPHONE (OUTPATIENT)
Dept: INTERNAL MEDICINE | Facility: CLINIC | Age: 62
End: 2022-10-21

## 2022-10-21 VITALS
SYSTOLIC BLOOD PRESSURE: 164 MMHG | BODY MASS INDEX: 40.21 KG/M2 | DIASTOLIC BLOOD PRESSURE: 90 MMHG | WEIGHT: 280.88 LBS | HEART RATE: 94 BPM | HEIGHT: 70 IN | OXYGEN SATURATION: 96 %

## 2022-10-21 DIAGNOSIS — Z99.89 USE OF CANE AS AMBULATORY AID: ICD-10-CM

## 2022-10-21 DIAGNOSIS — U09.9 COVID-19 LONG HAULER: ICD-10-CM

## 2022-10-21 DIAGNOSIS — F43.23 ADJUSTMENT REACTION WITH ANXIETY AND DEPRESSION: ICD-10-CM

## 2022-10-21 DIAGNOSIS — E11.9 TYPE 2 DIABETES MELLITUS WITHOUT COMPLICATION, WITHOUT LONG-TERM CURRENT USE OF INSULIN: Primary | ICD-10-CM

## 2022-10-21 DIAGNOSIS — I10 ESSENTIAL HYPERTENSION: Chronic | ICD-10-CM

## 2022-10-21 DIAGNOSIS — Z96.651 STATUS POST TOTAL RIGHT KNEE REPLACEMENT: ICD-10-CM

## 2022-10-21 PROCEDURE — 1159F PR MEDICATION LIST DOCUMENTED IN MEDICAL RECORD: ICD-10-PCS | Mod: CPTII,S$GLB,, | Performed by: INTERNAL MEDICINE

## 2022-10-21 PROCEDURE — 4010F PR ACE/ARB THEARPY RXD/TAKEN: ICD-10-PCS | Mod: CPTII,S$GLB,, | Performed by: INTERNAL MEDICINE

## 2022-10-21 PROCEDURE — 90471 FLU VACCINE (QUAD) GREATER THAN OR EQUAL TO 3YO PRESERVATIVE FREE IM: ICD-10-PCS | Mod: S$GLB,,, | Performed by: INTERNAL MEDICINE

## 2022-10-21 PROCEDURE — 4010F ACE/ARB THERAPY RXD/TAKEN: CPT | Mod: CPTII,S$GLB,, | Performed by: INTERNAL MEDICINE

## 2022-10-21 PROCEDURE — 1159F MED LIST DOCD IN RCRD: CPT | Mod: CPTII,S$GLB,, | Performed by: INTERNAL MEDICINE

## 2022-10-21 PROCEDURE — 3044F HG A1C LEVEL LT 7.0%: CPT | Mod: CPTII,S$GLB,, | Performed by: INTERNAL MEDICINE

## 2022-10-21 PROCEDURE — 3080F DIAST BP >= 90 MM HG: CPT | Mod: CPTII,S$GLB,, | Performed by: INTERNAL MEDICINE

## 2022-10-21 PROCEDURE — 3044F PR MOST RECENT HEMOGLOBIN A1C LEVEL <7.0%: ICD-10-PCS | Mod: CPTII,S$GLB,, | Performed by: INTERNAL MEDICINE

## 2022-10-21 PROCEDURE — 3077F SYST BP >= 140 MM HG: CPT | Mod: CPTII,S$GLB,, | Performed by: INTERNAL MEDICINE

## 2022-10-21 PROCEDURE — 99214 OFFICE O/P EST MOD 30 MIN: CPT | Mod: 25,S$GLB,, | Performed by: INTERNAL MEDICINE

## 2022-10-21 PROCEDURE — 3080F PR MOST RECENT DIASTOLIC BLOOD PRESSURE >= 90 MM HG: ICD-10-PCS | Mod: CPTII,S$GLB,, | Performed by: INTERNAL MEDICINE

## 2022-10-21 PROCEDURE — 99999 PR PBB SHADOW E&M-EST. PATIENT-LVL IV: ICD-10-PCS | Mod: PBBFAC,,, | Performed by: INTERNAL MEDICINE

## 2022-10-21 PROCEDURE — 1160F PR REVIEW ALL MEDS BY PRESCRIBER/CLIN PHARMACIST DOCUMENTED: ICD-10-PCS | Mod: CPTII,S$GLB,, | Performed by: INTERNAL MEDICINE

## 2022-10-21 PROCEDURE — 3066F NEPHROPATHY DOC TX: CPT | Mod: CPTII,S$GLB,, | Performed by: INTERNAL MEDICINE

## 2022-10-21 PROCEDURE — 3061F NEG MICROALBUMINURIA REV: CPT | Mod: CPTII,S$GLB,, | Performed by: INTERNAL MEDICINE

## 2022-10-21 PROCEDURE — 3061F PR NEG MICROALBUMINURIA RESULT DOCUMENTED/REVIEW: ICD-10-PCS | Mod: CPTII,S$GLB,, | Performed by: INTERNAL MEDICINE

## 2022-10-21 PROCEDURE — 3077F PR MOST RECENT SYSTOLIC BLOOD PRESSURE >= 140 MM HG: ICD-10-PCS | Mod: CPTII,S$GLB,, | Performed by: INTERNAL MEDICINE

## 2022-10-21 PROCEDURE — 3066F PR DOCUMENTATION OF TREATMENT FOR NEPHROPATHY: ICD-10-PCS | Mod: CPTII,S$GLB,, | Performed by: INTERNAL MEDICINE

## 2022-10-21 PROCEDURE — 99999 PR PBB SHADOW E&M-EST. PATIENT-LVL IV: CPT | Mod: PBBFAC,,, | Performed by: INTERNAL MEDICINE

## 2022-10-21 PROCEDURE — 99214 PR OFFICE/OUTPT VISIT, EST, LEVL IV, 30-39 MIN: ICD-10-PCS | Mod: 25,S$GLB,, | Performed by: INTERNAL MEDICINE

## 2022-10-21 PROCEDURE — 1160F RVW MEDS BY RX/DR IN RCRD: CPT | Mod: CPTII,S$GLB,, | Performed by: INTERNAL MEDICINE

## 2022-10-21 PROCEDURE — 90471 IMMUNIZATION ADMIN: CPT | Mod: S$GLB,,, | Performed by: INTERNAL MEDICINE

## 2022-10-21 PROCEDURE — 90686 FLU VACCINE (QUAD) GREATER THAN OR EQUAL TO 3YO PRESERVATIVE FREE IM: ICD-10-PCS | Mod: S$GLB,,, | Performed by: INTERNAL MEDICINE

## 2022-10-21 PROCEDURE — 90686 IIV4 VACC NO PRSV 0.5 ML IM: CPT | Mod: S$GLB,,, | Performed by: INTERNAL MEDICINE

## 2022-10-21 RX ORDER — LISINOPRIL 20 MG/1
20 TABLET ORAL DAILY
Qty: 90 TABLET | Refills: 3 | Status: SHIPPED | OUTPATIENT
Start: 2022-10-21 | End: 2022-12-09 | Stop reason: ALTCHOICE

## 2022-10-21 RX ORDER — FLASH GLUCOSE SENSOR
KIT MISCELLANEOUS
Qty: 1 KIT | Refills: 11 | Status: SHIPPED | OUTPATIENT
Start: 2022-10-21 | End: 2024-02-29

## 2022-10-21 RX ORDER — DICLOFENAC SODIUM 10 MG/G
2 GEL TOPICAL 4 TIMES DAILY
Qty: 100 G | Refills: 1 | Status: SHIPPED | OUTPATIENT
Start: 2022-10-21 | End: 2024-02-29

## 2022-10-21 NOTE — PROGRESS NOTES
"    CHIEF COMPLAINT     Chief Complaint   Patient presents with    Follow-up       HPI     Ned Brock is a 61 y.o. male type 2DM, YANETH, long covid, HTN  here today for     S/p R TKA. Reports pain in artificial knee. Underwent nerve ablation last week. Having a lot of post op pain. Currently taking tramadol which he isn't finding helpful. Reports stiffness in knee. Reports    Reports he stopped his citalopram because surgeon was concerned about drug/drug with tramadol    Has been taking lisinopril 10mg daily. BP has been elevated.    Personally Reviewed Patient's Medical, surgical, family and social hx. Changes updated in [employer].  Care Team updated in Epic    Review of Systems:  Review of Systems   Constitutional:  Positive for fatigue.   Musculoskeletal:  Positive for arthralgias and gait problem.     Health Maintenance:   Reviewed with patient  Due for the following:  Flu shot due    PHYSICAL EXAM     BP (!) 164/90 (BP Location: Right arm, Patient Position: Sitting, BP Method: Large (Manual))   Pulse 94   Ht 5' 10" (1.778 m)   Wt 127.4 kg (280 lb 13.9 oz)   SpO2 96%   BMI 40.30 kg/m²     Gen: Well Appearing, NAD, using cane  HEENT: PERR, EOMI  Neck: FROM, no thyromegaly, no cervical adenopathy  CVD: RRR, no M/R/G  Pulm: Normal work of breathing, CTAB, no wheezing  Abd:  Soft, NT, ND non TTP, no mass  MSK: no LE edema  Neuro: A&Ox3, gait abnormal, speech normal  Mood; Mood normal, behavior normal, thought process linear       LABS     Labs reviewed; Notable for  Lab Results   Component Value Date    CREATININE 0.8 06/22/2022    BUN 17 06/22/2022     06/22/2022    K 4.4 06/22/2022    CL 97 06/22/2022    CO2 30 (H) 06/22/2022       ASSESSMENT     1. Type 2 diabetes mellitus without complication, without long-term current use of insulin  flash glucose sensor (FREESTYLE JANETH 14 DAY SENSOR) Kit    Hemoglobin A1C    Comprehensive Metabolic Panel      2. Essential hypertension  lisinopriL (PRINIVIL,ZESTRIL) 20 " MG tablet      3. Status post total right knee replacement  diclofenac sodium (VOLTAREN) 1 % Gel      4. Adjustment reaction with anxiety and depression        5. COVID-19 long hauler-physical and cognitive changes        6. Use of cane as ambulatory aid                Plan     Ned Brock is a 61 y.o. male with COVID with residual cognitive physical debility type 2 diabetes, hypertension, recent total knee replacement here today for     1. Type 2 diabetes mellitus without complication, without long-term current use of insulin  At goal will recheck a1c at next visit  Refilled CGM sensor  - flash glucose sensor (FREESTYLE JANETH 14 DAY SENSOR) Kit; IU  Dispense: 1 kit; Refill: 11  - Hemoglobin A1C; Future  - Comprehensive Metabolic Panel; Future    2. Essential hypertension  Increase lisinopril to 20  - lisinopriL (PRINIVIL,ZESTRIL) 20 MG tablet; Take 1 tablet (20 mg total) by mouth once daily.  Dispense: 90 tablet; Refill: 3    3. Status post total right knee replacement  Struggling with postoperative pain and joint stiffness.  He is back in physical therapy.  Going to try topical diclofenac gel to see if it helps with some of his joint pain.  Encourage patient to discuss with his orthopedic surgeon the tramadol is not providing much analgesia and having side effects from medication  - diclofenac sodium (VOLTAREN) 1 % Gel; Apply 2 g topically 4 (four) times daily.  Dispense: 100 g; Refill: 1    4. Adjustment reaction with anxiety and depression  Restart citalopram 20mg daily. Having therapeutic benefit    5. COVID-19 long hauler-physical and cognitive changes  Still decreased stamina and exercise tolerance  Functional status negatively impacted by debility from recent knee surgery    6. Use of cane as ambulatory aid  Using cane to ambulate  Donaldo Willis MD

## 2022-10-21 NOTE — TELEPHONE ENCOUNTER
PATRICIA does not manage PA for this patient. Please contact the number on the back of the members card for further assistance.

## 2022-10-21 NOTE — TELEPHONE ENCOUNTER
Dr Willis the Voltaren Gel is not covered and or cannot be processed for consideration. Can he use the OTC brand?

## 2022-10-21 NOTE — TELEPHONE ENCOUNTER
Ned Brock ([place]: [id number])  Diclofenac not covered.    Your demographic data has been sent to [employer].    Please wait while we retrieve the set of questions required to initiate the PA request.

## 2022-10-25 ENCOUNTER — TELEPHONE (OUTPATIENT)
Dept: INTERNAL MEDICINE | Facility: CLINIC | Age: 62
End: 2022-10-25

## 2022-10-25 ENCOUNTER — PATIENT MESSAGE (OUTPATIENT)
Dept: INTERNAL MEDICINE | Facility: CLINIC | Age: 62
End: 2022-10-25
Payer: COMMERCIAL

## 2022-10-25 NOTE — TELEPHONE ENCOUNTER
----- Message from Lilian Wells sent at 10/25/2022 11:48 AM CDT -----  Contact: Patient @ [phone]  Patient is returning a phone call.  Who left a message for the patient: Jaja Bush LPN   Does patient know what this is regarding:    Would you like a call back, or a response through your [facility] portal?:   call   Comments:

## 2022-10-27 ENCOUNTER — PATIENT OUTREACH (OUTPATIENT)
Dept: ADMINISTRATIVE | Facility: HOSPITAL | Age: 62
End: 2022-10-27
Payer: COMMERCIAL

## 2022-10-27 NOTE — PROGRESS NOTES
Health Maintenance Due   Topic Date Due    HIV Screening  Never done    COVID-19 Vaccine (5 - Booster) 09/06/2022     Triggered LINKS and reconciled immunizations. Updated Care Everywhere. Scanned unsigned Physician's Statement of Disability into media. Chart review completed.

## 2022-12-09 ENCOUNTER — OFFICE VISIT (OUTPATIENT)
Dept: INTERNAL MEDICINE | Facility: CLINIC | Age: 62
End: 2022-12-09
Payer: COMMERCIAL

## 2022-12-09 VITALS
SYSTOLIC BLOOD PRESSURE: 184 MMHG | HEIGHT: 70 IN | BODY MASS INDEX: 40.37 KG/M2 | WEIGHT: 282 LBS | HEART RATE: 98 BPM | DIASTOLIC BLOOD PRESSURE: 70 MMHG | OXYGEN SATURATION: 93 %

## 2022-12-09 DIAGNOSIS — I10 ESSENTIAL HYPERTENSION: ICD-10-CM

## 2022-12-09 DIAGNOSIS — E11.9 TYPE 2 DIABETES MELLITUS WITHOUT COMPLICATION, WITHOUT LONG-TERM CURRENT USE OF INSULIN: ICD-10-CM

## 2022-12-09 DIAGNOSIS — T84.84XS PAIN IN PROSTHETIC JOINT, SEQUELA: ICD-10-CM

## 2022-12-09 DIAGNOSIS — F32.4 MAJOR DEPRESSIVE DISORDER WITH SINGLE EPISODE, IN PARTIAL REMISSION: ICD-10-CM

## 2022-12-09 DIAGNOSIS — U09.9 COVID-19 LONG HAULER: Primary | ICD-10-CM

## 2022-12-09 DIAGNOSIS — R05.3 CHRONIC COUGH: ICD-10-CM

## 2022-12-09 PROCEDURE — 99214 PR OFFICE/OUTPT VISIT, EST, LEVL IV, 30-39 MIN: ICD-10-PCS | Mod: S$GLB,,, | Performed by: INTERNAL MEDICINE

## 2022-12-09 PROCEDURE — 1160F RVW MEDS BY RX/DR IN RCRD: CPT | Mod: CPTII,S$GLB,, | Performed by: INTERNAL MEDICINE

## 2022-12-09 PROCEDURE — 4010F PR ACE/ARB THEARPY RXD/TAKEN: ICD-10-PCS | Mod: CPTII,S$GLB,, | Performed by: INTERNAL MEDICINE

## 2022-12-09 PROCEDURE — 3061F NEG MICROALBUMINURIA REV: CPT | Mod: CPTII,S$GLB,, | Performed by: INTERNAL MEDICINE

## 2022-12-09 PROCEDURE — 3044F HG A1C LEVEL LT 7.0%: CPT | Mod: CPTII,S$GLB,, | Performed by: INTERNAL MEDICINE

## 2022-12-09 PROCEDURE — 4010F ACE/ARB THERAPY RXD/TAKEN: CPT | Mod: CPTII,S$GLB,, | Performed by: INTERNAL MEDICINE

## 2022-12-09 PROCEDURE — 3078F DIAST BP <80 MM HG: CPT | Mod: CPTII,S$GLB,, | Performed by: INTERNAL MEDICINE

## 2022-12-09 PROCEDURE — 3077F PR MOST RECENT SYSTOLIC BLOOD PRESSURE >= 140 MM HG: ICD-10-PCS | Mod: CPTII,S$GLB,, | Performed by: INTERNAL MEDICINE

## 2022-12-09 PROCEDURE — 3066F NEPHROPATHY DOC TX: CPT | Mod: CPTII,S$GLB,, | Performed by: INTERNAL MEDICINE

## 2022-12-09 PROCEDURE — 99999 PR PBB SHADOW E&M-EST. PATIENT-LVL IV: ICD-10-PCS | Mod: PBBFAC,,, | Performed by: INTERNAL MEDICINE

## 2022-12-09 PROCEDURE — 99999 PR PBB SHADOW E&M-EST. PATIENT-LVL IV: CPT | Mod: PBBFAC,,, | Performed by: INTERNAL MEDICINE

## 2022-12-09 PROCEDURE — 1159F PR MEDICATION LIST DOCUMENTED IN MEDICAL RECORD: ICD-10-PCS | Mod: CPTII,S$GLB,, | Performed by: INTERNAL MEDICINE

## 2022-12-09 PROCEDURE — 3044F PR MOST RECENT HEMOGLOBIN A1C LEVEL <7.0%: ICD-10-PCS | Mod: CPTII,S$GLB,, | Performed by: INTERNAL MEDICINE

## 2022-12-09 PROCEDURE — 99214 OFFICE O/P EST MOD 30 MIN: CPT | Mod: S$GLB,,, | Performed by: INTERNAL MEDICINE

## 2022-12-09 PROCEDURE — 3078F PR MOST RECENT DIASTOLIC BLOOD PRESSURE < 80 MM HG: ICD-10-PCS | Mod: CPTII,S$GLB,, | Performed by: INTERNAL MEDICINE

## 2022-12-09 PROCEDURE — 3077F SYST BP >= 140 MM HG: CPT | Mod: CPTII,S$GLB,, | Performed by: INTERNAL MEDICINE

## 2022-12-09 PROCEDURE — 3061F PR NEG MICROALBUMINURIA RESULT DOCUMENTED/REVIEW: ICD-10-PCS | Mod: CPTII,S$GLB,, | Performed by: INTERNAL MEDICINE

## 2022-12-09 PROCEDURE — 3008F PR BODY MASS INDEX (BMI) DOCUMENTED: ICD-10-PCS | Mod: CPTII,S$GLB,, | Performed by: INTERNAL MEDICINE

## 2022-12-09 PROCEDURE — 1160F PR REVIEW ALL MEDS BY PRESCRIBER/CLIN PHARMACIST DOCUMENTED: ICD-10-PCS | Mod: CPTII,S$GLB,, | Performed by: INTERNAL MEDICINE

## 2022-12-09 PROCEDURE — 1159F MED LIST DOCD IN RCRD: CPT | Mod: CPTII,S$GLB,, | Performed by: INTERNAL MEDICINE

## 2022-12-09 PROCEDURE — 3066F PR DOCUMENTATION OF TREATMENT FOR NEPHROPATHY: ICD-10-PCS | Mod: CPTII,S$GLB,, | Performed by: INTERNAL MEDICINE

## 2022-12-09 PROCEDURE — 3008F BODY MASS INDEX DOCD: CPT | Mod: CPTII,S$GLB,, | Performed by: INTERNAL MEDICINE

## 2022-12-09 RX ORDER — CITALOPRAM 40 MG/1
40 TABLET, FILM COATED ORAL DAILY
Qty: 90 TABLET | Refills: 3 | Status: SHIPPED | OUTPATIENT
Start: 2022-12-09 | End: 2024-01-02 | Stop reason: SDUPTHER

## 2022-12-09 RX ORDER — DULAGLUTIDE 1.5 MG/.5ML
1.5 INJECTION, SOLUTION SUBCUTANEOUS
Qty: 12 PEN | Refills: 3 | Status: SHIPPED | OUTPATIENT
Start: 2022-12-09 | End: 2023-12-09

## 2022-12-09 RX ORDER — AMLODIPINE AND BENAZEPRIL HYDROCHLORIDE 5; 20 MG/1; MG/1
1 CAPSULE ORAL DAILY
Qty: 90 CAPSULE | Refills: 3 | Status: SHIPPED | OUTPATIENT
Start: 2022-12-09 | End: 2023-11-27

## 2022-12-09 RX ORDER — BENZONATATE 100 MG/1
100 CAPSULE ORAL 3 TIMES DAILY PRN
Qty: 180 CAPSULE | Refills: 1 | Status: SHIPPED | OUTPATIENT
Start: 2022-12-09 | End: 2022-12-19

## 2022-12-09 NOTE — PROGRESS NOTES
"    CHIEF COMPLAINT     Chief Complaint   Patient presents with    Follow-up       HPI     Ned Brock is a 62 y.o. male type 2DM, YANETH, long covid, HTN  here today for   here today for     Knee is still bothering him. Plan for ICS R hip. Mobility limited. He is still in therapy.    Anxiety has improved with citalopram.Reports memory isn't as good. Reports having issues motivation    Personally Reviewed Patient's Medical, surgical, family and social hx. Changes updated in [facility].  Care Team updated in Epic    Review of Systems:  Review of Systems   Respiratory:  Positive for cough.    Psychiatric/Behavioral:  Positive for dysphoric mood.      Health Maintenance:   Reviewed with patient  Due for the following:      PHYSICAL EXAM     BP (!) 184/70 (BP Location: Right arm, Patient Position: Sitting, BP Method: Large (Manual))   Pulse 98   Ht 5' 10" (1.778 m)   Wt 127.9 kg (282 lb)   SpO2 (!) 93%   BMI 40.46 kg/m²     Gen: Well Appearing, NAD  HEENT: PERR, EOMI clear to him.  Membrane effusion bilaterally, enlarged nasal turbinate  Neck: FROM, no thyromegaly, no cervical adenopathy  CVD: RRR, no M/R/G  Pulm: Normal work of breathing, CTAB, no wheezing  Abd:  Soft, NT, ND non TTP, no mass  MSK: no LE edema  Neuro: A&Ox3, gait normal, speech normal  Mood; Mood normal, behavior normal, thought process linear       LABS     Labs reviewed; Notable for    Chemistry        Component Value Date/Time     06/22/2022 1328    K 4.4 06/22/2022 1328    CL 97 06/22/2022 1328    CO2 30 (H) 06/22/2022 1328    BUN 17 06/22/2022 1328    CREATININE 0.8 06/22/2022 1328     (H) 06/22/2022 1328        Component Value Date/Time    CALCIUM 9.7 06/22/2022 1328    ALKPHOS 106 06/22/2022 1328    AST 25 06/22/2022 1328    ALT 37 06/22/2022 1328    BILITOT 0.5 06/22/2022 1328    ESTGFRAFRICA >60 06/22/2022 1328    EGFRNONAA >60 06/22/2022 1328        Lab Results   Component Value Date    HGBA1C 6.8 (H) 07/12/2022       ASSESSMENT "     1. COVID-19 long hauler-physical and cognitive changes        2. Essential hypertension  amlodipine-benazepril 5-20 mg (LOTREL) 5-20 mg per capsule      3. Type 2 diabetes mellitus without complication, without long-term current use of insulin  dulaglutide (TRULICITY) 1.5 mg/0.5 mL pen injector      4. Major depressive disorder with single episode, in partial remission  citalopram (CELEXA) 40 MG tablet      5. Chronic cough  benzonatate (TESSALON) 100 MG capsule      6. 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 male with type 2DM, YANETH, long covid, HTN  here today for      1. COVID-19 long hauler-physical and cognitive changes  Still struggling postinfection.  Trying to treat symptoms and manage energy expenditures.  However, patient definitely needs help with avoiding deconditioning.    2. Essential hypertension  Adjust blood pressure regimen due to elevations in blood pressure  - amlodipine-benazepril 5-20 mg (LOTREL) 5-20 mg per capsule; Take 1 capsule by mouth once daily.  Dispense: 90 capsule; Refill: 3    3. Type 2 diabetes mellitus without complication, without long-term current use of insulin  Going to increase Trulicity  - dulaglutide (TRULICITY) 1.5 mg/0.5 mL pen injector; Inject 1.5 mg into the skin every 7 days.  Dispense: 12 pen; Refill: 3    4. Major depressive disorder with single episode, in partial remission  Trial of increased citalopram.  Will have follow-up with me in 6 weeks.  If symptoms not improved consider therapeutic trial Wellbutrin  - citalopram (CELEXA) 40 MG tablet; Take 1 tablet (40 mg total) by mouth once daily.  Dispense: 90 tablet; Refill: 3    5. Chronic cough  - benzonatate (TESSALON) 100 MG capsule; Take 1 capsule (100 mg total) by mouth 3 (three) times daily as needed for Cough.  Dispense: 180 capsule; Refill: 1    6. 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  Having significant mobility impairment after having his  knee replaced.  Donaldo Willis MD

## 2022-12-12 ENCOUNTER — PATIENT MESSAGE (OUTPATIENT)
Dept: INTERNAL MEDICINE | Facility: CLINIC | Age: 62
End: 2022-12-12
Payer: COMMERCIAL

## 2022-12-29 ENCOUNTER — PATIENT MESSAGE (OUTPATIENT)
Dept: PULMONOLOGY | Facility: CLINIC | Age: 62
End: 2022-12-29
Payer: COMMERCIAL

## 2023-02-07 ENCOUNTER — TELEPHONE (OUTPATIENT)
Dept: INTERNAL MEDICINE | Facility: CLINIC | Age: 63
End: 2023-02-07
Payer: COMMERCIAL

## 2023-02-07 NOTE — TELEPHONE ENCOUNTER
----- Message from Kylie Lopez sent at 2/7/2023  6:49 AM CST -----  Regarding: sooner appt  Contact: pt  Patient would like to be seen sooner than the next available appointment.    Please advise.     Phone [phone]

## 2023-02-08 DIAGNOSIS — E11.9 TYPE 2 DIABETES MELLITUS WITHOUT COMPLICATION, UNSPECIFIED WHETHER LONG TERM INSULIN USE: ICD-10-CM

## 2023-02-13 ENCOUNTER — PATIENT MESSAGE (OUTPATIENT)
Dept: ADMINISTRATIVE | Facility: HOSPITAL | Age: 63
End: 2023-02-13
Payer: COMMERCIAL

## 2023-02-14 ENCOUNTER — OFFICE VISIT (OUTPATIENT)
Dept: INTERNAL MEDICINE | Facility: CLINIC | Age: 63
End: 2023-02-14
Payer: COMMERCIAL

## 2023-02-14 ENCOUNTER — LAB VISIT (OUTPATIENT)
Dept: LAB | Facility: HOSPITAL | Age: 63
End: 2023-02-14
Attending: INTERNAL MEDICINE
Payer: COMMERCIAL

## 2023-02-14 VITALS
DIASTOLIC BLOOD PRESSURE: 88 MMHG | OXYGEN SATURATION: 97 % | HEIGHT: 70 IN | SYSTOLIC BLOOD PRESSURE: 138 MMHG | HEART RATE: 96 BPM | BODY MASS INDEX: 40.09 KG/M2 | WEIGHT: 280 LBS

## 2023-02-14 DIAGNOSIS — I27.20 PULMONARY HYPERTENSION: ICD-10-CM

## 2023-02-14 DIAGNOSIS — T84.84XS PAIN IN PROSTHETIC JOINT, SEQUELA: ICD-10-CM

## 2023-02-14 DIAGNOSIS — E66.01 SEVERE OBESITY (BMI >= 40): ICD-10-CM

## 2023-02-14 DIAGNOSIS — F32.4 MAJOR DEPRESSIVE DISORDER WITH SINGLE EPISODE, IN PARTIAL REMISSION: ICD-10-CM

## 2023-02-14 DIAGNOSIS — E11.9 TYPE 2 DIABETES MELLITUS WITHOUT COMPLICATION, WITHOUT LONG-TERM CURRENT USE OF INSULIN: Primary | ICD-10-CM

## 2023-02-14 DIAGNOSIS — E11.69 TYPE 2 DIABETES MELLITUS WITH OTHER SPECIFIED COMPLICATION, WITHOUT LONG-TERM CURRENT USE OF INSULIN: ICD-10-CM

## 2023-02-14 DIAGNOSIS — E11.9 TYPE 2 DIABETES MELLITUS WITHOUT COMPLICATION, WITHOUT LONG-TERM CURRENT USE OF INSULIN: ICD-10-CM

## 2023-02-14 DIAGNOSIS — J42 CHRONIC BRONCHITIS, UNSPECIFIED CHRONIC BRONCHITIS TYPE: ICD-10-CM

## 2023-02-14 DIAGNOSIS — U09.9 COVID-19 LONG HAULER: ICD-10-CM

## 2023-02-14 LAB
ALBUMIN SERPL BCP-MCNC: 4.4 G/DL (ref 3.5–5.2)
ALP SERPL-CCNC: 79 U/L (ref 55–135)
ALT SERPL W/O P-5'-P-CCNC: 39 U/L (ref 10–44)
ANION GAP SERPL CALC-SCNC: 13 MMOL/L (ref 8–16)
AST SERPL-CCNC: 35 U/L (ref 10–40)
BILIRUB SERPL-MCNC: 0.3 MG/DL (ref 0.1–1)
BUN SERPL-MCNC: 21 MG/DL (ref 6–20)
CALCIUM SERPL-MCNC: 9.8 MG/DL (ref 8.7–10.5)
CHLORIDE SERPL-SCNC: 99 MMOL/L (ref 95–110)
CO2 SERPL-SCNC: 26 MMOL/L (ref 23–29)
CREAT SERPL-MCNC: 0.9 MG/DL (ref 0.5–1.4)
EST. GFR  (NO RACE VARIABLE): >60 ML/MIN/1.73 M^2
ESTIMATED AVG GLUCOSE: 140 MG/DL (ref 68–131)
GLUCOSE SERPL-MCNC: 82 MG/DL (ref 70–110)
HBA1C MFR BLD: 6.5 % (ref 4–5.6)
POTASSIUM SERPL-SCNC: 4.2 MMOL/L (ref 3.5–5.1)
PROT SERPL-MCNC: 8 G/DL (ref 6–8.4)
SODIUM SERPL-SCNC: 138 MMOL/L (ref 136–145)

## 2023-02-14 PROCEDURE — 83036 HEMOGLOBIN GLYCOSYLATED A1C: CPT | Performed by: INTERNAL MEDICINE

## 2023-02-14 PROCEDURE — 80053 COMPREHEN METABOLIC PANEL: CPT | Performed by: INTERNAL MEDICINE

## 2023-02-14 PROCEDURE — 36415 COLL VENOUS BLD VENIPUNCTURE: CPT | Performed by: INTERNAL MEDICINE

## 2023-02-14 PROCEDURE — 3008F PR BODY MASS INDEX (BMI) DOCUMENTED: ICD-10-PCS | Mod: CPTII,S$GLB,, | Performed by: INTERNAL MEDICINE

## 2023-02-14 PROCEDURE — 1159F PR MEDICATION LIST DOCUMENTED IN MEDICAL RECORD: ICD-10-PCS | Mod: CPTII,S$GLB,, | Performed by: INTERNAL MEDICINE

## 2023-02-14 PROCEDURE — 3008F BODY MASS INDEX DOCD: CPT | Mod: CPTII,S$GLB,, | Performed by: INTERNAL MEDICINE

## 2023-02-14 PROCEDURE — 1159F MED LIST DOCD IN RCRD: CPT | Mod: CPTII,S$GLB,, | Performed by: INTERNAL MEDICINE

## 2023-02-14 PROCEDURE — 99999 PR PBB SHADOW E&M-EST. PATIENT-LVL IV: ICD-10-PCS | Mod: PBBFAC,,, | Performed by: INTERNAL MEDICINE

## 2023-02-14 PROCEDURE — 99999 PR PBB SHADOW E&M-EST. PATIENT-LVL IV: CPT | Mod: PBBFAC,,, | Performed by: INTERNAL MEDICINE

## 2023-02-14 PROCEDURE — 3079F PR MOST RECENT DIASTOLIC BLOOD PRESSURE 80-89 MM HG: ICD-10-PCS | Mod: CPTII,S$GLB,, | Performed by: INTERNAL MEDICINE

## 2023-02-14 PROCEDURE — 99214 PR OFFICE/OUTPT VISIT, EST, LEVL IV, 30-39 MIN: ICD-10-PCS | Mod: S$GLB,,, | Performed by: INTERNAL MEDICINE

## 2023-02-14 PROCEDURE — 3079F DIAST BP 80-89 MM HG: CPT | Mod: CPTII,S$GLB,, | Performed by: INTERNAL MEDICINE

## 2023-02-14 PROCEDURE — 99214 OFFICE O/P EST MOD 30 MIN: CPT | Mod: S$GLB,,, | Performed by: INTERNAL MEDICINE

## 2023-02-14 PROCEDURE — 3075F SYST BP GE 130 - 139MM HG: CPT | Mod: CPTII,S$GLB,, | Performed by: INTERNAL MEDICINE

## 2023-02-14 PROCEDURE — 3075F PR MOST RECENT SYSTOLIC BLOOD PRESS GE 130-139MM HG: ICD-10-PCS | Mod: CPTII,S$GLB,, | Performed by: INTERNAL MEDICINE

## 2023-02-14 RX ORDER — ALBUTEROL SULFATE 0.63 MG/3ML
2.5 SOLUTION RESPIRATORY (INHALATION) EVERY 6 HOURS PRN
Qty: 75 ML | Refills: 3 | Status: SHIPPED | OUTPATIENT
Start: 2023-02-14

## 2023-02-14 RX ORDER — ALBUTEROL SULFATE 90 UG/1
2 AEROSOL, METERED RESPIRATORY (INHALATION) EVERY 6 HOURS PRN
Qty: 18 G | Refills: 3 | Status: SHIPPED | OUTPATIENT
Start: 2023-02-14 | End: 2024-02-14

## 2023-02-14 NOTE — PROGRESS NOTES
"    CHIEF COMPLAINT     Chief Complaint   Patient presents with    Follow-up       HPI     Ned Brock is a 62 y.o. male  2DM, YANETH, long covid, HTN  here today here today for depression follow-up  Ms. Last visit due to change in insurance  Last visit increase citalopram to 40 mg daily.  Patient has noticed improvement in mood    Still having issues with debility and decreased exercise tolerance from COVID.    Still having tightness in his knee.  He is currently working with his orthopedist wants to try inject his hip to see if it is referred pain    Personally Reviewed Patient's Medical, surgical, family and social hx. Changes updated in [facility].  Care Team updated in Epic    Review of Systems:  Review of Systems   Constitutional:  Positive for fatigue.   Musculoskeletal:  Positive for arthralgias and gait problem.   Psychiatric/Behavioral:  Negative for dysphoric mood.      Health Maintenance:   Reviewed with patient  Due for the following:      PHYSICAL EXAM     BP (!) 160/90 (BP Location: Right arm, Patient Position: Sitting, BP Method: Large (Manual))   Pulse 96   Ht 5' 10" (1.778 m)   Wt 127 kg (280 lb)   SpO2 97%   BMI 40.18 kg/m²     Gen: Well Appearing, NAD  HEENT: PERR, EOMI  Neck: FROM, no thyromegaly, no cervical adenopathy  CVD: RRR, no M/R/G  Pulm: Normal work of breathing, CTAB, no wheezing  Abd:  Soft, NT, ND non TTP, no mass  MSK: no LE edema  Neuro: A&Ox3, gait abnormal using cane, speech normal  Mood; Mood normal, behavior normal, thought process linear       LABS     Labs reviewed; ordered  ASSESSMENT     1. Type 2 diabetes mellitus without complication, without long-term current use of insulin  Comprehensive Metabolic Panel    Hemoglobin A1C    Microalbumin/Creatinine Ratio, Urine      2. 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 Major depressive disorder with single episode, in partial remission        4. Chronic bronchitis, unspecified chronic bronchitis type  albuterol (ACCUNEB) 0.63 " mg/3 mL Nebu    albuterol (VENTOLIN HFA) 90 mcg/actuation inhaler              Plan     Ned Brock is a 62 y.o. male with  1. Type 2 diabetes mellitus without complication, without long-term current use of insulin  Will recheck a1c  Will increase trulicity or switch to ozempic if a1c not at goal  - Comprehensive Metabolic Panel; Future  - Hemoglobin A1C; Future  - Microalbumin/Creatinine Ratio, Urine; Future    2. 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 Major depressive disorder with single episode, in partial remission  Improved continue citalopram 40 for now.    4. Chronic bronchitis, unspecified chronic bronchitis type  - albuterol (ACCUNEB) 0.63 mg/3 mL Nebu; Take 11.9 mLs (2.499 mg total) by nebulization every 6 (six) hours as needed (wheezing/shortness of breath). Rescue  Dispense: 75 mL; Refill: 3  - albuterol (VENTOLIN HFA) 90 mcg/actuation inhaler; Inhale 2 puffs into the lungs every 6 (six) hours as needed for Wheezing. Rescue  Dispense: 18 g; Refill: 3      Donaldo Willis MD

## 2023-03-08 ENCOUNTER — TELEPHONE (OUTPATIENT)
Dept: INTERNAL MEDICINE | Facility: CLINIC | Age: 63
End: 2023-03-08
Payer: COMMERCIAL

## 2023-03-08 NOTE — TELEPHONE ENCOUNTER
Ned Brock ([place]: [id number]) Trulicity not covered.    Your demographic data has been sent to [employer].    Please wait while we retrieve the set of questions required to initiate the PA request.

## 2023-03-08 NOTE — TELEPHONE ENCOUNTER
Ned Brock Key: [id number] - Rx #: [id number] help? Call us at [phone]  Status  Sent to [facility]  Drug  Trulicity 1.5MG/0.5ML pen-injectors  Form  [employer] Electronic PA Form

## 2023-03-09 ENCOUNTER — TELEPHONE (OUTPATIENT)
Dept: INTERNAL MEDICINE | Facility: CLINIC | Age: 63
End: 2023-03-09
Payer: COMMERCIAL

## 2023-03-09 NOTE — TELEPHONE ENCOUNTER
Numerous forms received from [employer]/ [employer] for this pt's Trulicity.   Forms completed and placed on desk for signature.

## 2023-03-23 ENCOUNTER — TELEPHONE (OUTPATIENT)
Dept: INTERNAL MEDICINE | Facility: CLINIC | Age: 63
End: 2023-03-23
Payer: COMMERCIAL

## 2023-03-23 NOTE — TELEPHONE ENCOUNTER
Information regarding your request. Trulicity  PA was already submitted for this patient and drug which was denied.;CaseId:[id number];Status:Denied;Appeal Information: Attention:ATTN: CLINICAL APPEALS DEPARTMENT [employer] [address] [address],[place], [place],[address] Phone:[phone] Fax:[phone];

## 2023-03-23 NOTE — TELEPHONE ENCOUNTER
Ned Brock (Key: [id number]) Renewed in Cover [employer].    Your demographic data has been sent to [employer].    Please wait while we retrieve the set of questions required to initiate the PA request.

## 2023-03-24 ENCOUNTER — OFFICE VISIT (OUTPATIENT)
Dept: PAIN MEDICINE | Facility: CLINIC | Age: 63
End: 2023-03-24
Payer: COMMERCIAL

## 2023-03-24 VITALS
SYSTOLIC BLOOD PRESSURE: 145 MMHG | RESPIRATION RATE: 16 BRPM | OXYGEN SATURATION: 95 % | HEART RATE: 84 BPM | WEIGHT: 279.5 LBS | DIASTOLIC BLOOD PRESSURE: 68 MMHG | BODY MASS INDEX: 40.1 KG/M2

## 2023-03-24 DIAGNOSIS — M25.551 RIGHT HIP PAIN: Primary | ICD-10-CM

## 2023-03-24 PROCEDURE — 4010F ACE/ARB THERAPY RXD/TAKEN: CPT | Mod: CPTII,S$GLB,, | Performed by: PAIN MEDICINE

## 2023-03-24 PROCEDURE — 3008F PR BODY MASS INDEX (BMI) DOCUMENTED: ICD-10-PCS | Mod: CPTII,S$GLB,, | Performed by: PAIN MEDICINE

## 2023-03-24 PROCEDURE — 99204 PR OFFICE/OUTPT VISIT, NEW, LEVL IV, 45-59 MIN: ICD-10-PCS | Mod: S$GLB,,, | Performed by: PAIN MEDICINE

## 2023-03-24 PROCEDURE — 1159F PR MEDICATION LIST DOCUMENTED IN MEDICAL RECORD: ICD-10-PCS | Mod: CPTII,S$GLB,, | Performed by: PAIN MEDICINE

## 2023-03-24 PROCEDURE — 3077F SYST BP >= 140 MM HG: CPT | Mod: CPTII,S$GLB,, | Performed by: PAIN MEDICINE

## 2023-03-24 PROCEDURE — 99999 PR PBB SHADOW E&M-EST. PATIENT-LVL IV: CPT | Mod: PBBFAC,,, | Performed by: PAIN MEDICINE

## 2023-03-24 PROCEDURE — 1160F RVW MEDS BY RX/DR IN RCRD: CPT | Mod: CPTII,S$GLB,, | Performed by: PAIN MEDICINE

## 2023-03-24 PROCEDURE — 1160F PR REVIEW ALL MEDS BY PRESCRIBER/CLIN PHARMACIST DOCUMENTED: ICD-10-PCS | Mod: CPTII,S$GLB,, | Performed by: PAIN MEDICINE

## 2023-03-24 PROCEDURE — 3078F DIAST BP <80 MM HG: CPT | Mod: CPTII,S$GLB,, | Performed by: PAIN MEDICINE

## 2023-03-24 PROCEDURE — 3008F BODY MASS INDEX DOCD: CPT | Mod: CPTII,S$GLB,, | Performed by: PAIN MEDICINE

## 2023-03-24 PROCEDURE — 3078F PR MOST RECENT DIASTOLIC BLOOD PRESSURE < 80 MM HG: ICD-10-PCS | Mod: CPTII,S$GLB,, | Performed by: PAIN MEDICINE

## 2023-03-24 PROCEDURE — 4010F PR ACE/ARB THEARPY RXD/TAKEN: ICD-10-PCS | Mod: CPTII,S$GLB,, | Performed by: PAIN MEDICINE

## 2023-03-24 PROCEDURE — 3077F PR MOST RECENT SYSTOLIC BLOOD PRESSURE >= 140 MM HG: ICD-10-PCS | Mod: CPTII,S$GLB,, | Performed by: PAIN MEDICINE

## 2023-03-24 PROCEDURE — 99999 PR PBB SHADOW E&M-EST. PATIENT-LVL IV: ICD-10-PCS | Mod: PBBFAC,,, | Performed by: PAIN MEDICINE

## 2023-03-24 PROCEDURE — 3044F HG A1C LEVEL LT 7.0%: CPT | Mod: CPTII,S$GLB,, | Performed by: PAIN MEDICINE

## 2023-03-24 PROCEDURE — 99204 OFFICE O/P NEW MOD 45 MIN: CPT | Mod: S$GLB,,, | Performed by: PAIN MEDICINE

## 2023-03-24 PROCEDURE — 1159F MED LIST DOCD IN RCRD: CPT | Mod: CPTII,S$GLB,, | Performed by: PAIN MEDICINE

## 2023-03-24 PROCEDURE — 3044F PR MOST RECENT HEMOGLOBIN A1C LEVEL <7.0%: ICD-10-PCS | Mod: CPTII,S$GLB,, | Performed by: PAIN MEDICINE

## 2023-03-24 NOTE — PROGRESS NOTES
Mr. Marino will be scheduled for right hip steroid injection on 3/31/23, checking in at 2:00pm.  Reviewed the pre-procedure instructions listed below with the patient- copy also provided.  Instructed patient to notify clinic if he begins feeling ill, runs a fever, is prescribed antibiotics, and/or has any outpatient procedures (colonoscopy, endoscopy, OBGYN, dental work, etc.), and/or any vaccinations or booster shots within the two weeks leading up to this procedure.  Instructed patient to report to [facility], 2nd Floor Endoscopy Registration Desk.  All questions answered- patient verbalized understanding.     Day of Procedure  Ensure you have obtained an arrival time from the Pain Management Staff  You will be contacted the week before your scheduled date to review these instructions and receive your arrival time.  Please check any voicemails received.  If you arrive past your scheduled procedure time, you may be asked to reschedule your procedure.    Ensure you have a  with you.  If you arrive without a responsible adult to stay with you and drive you home, you may be asked to reschedule your procedure.    This is NOT a fasting procedure, you may eat the day of your procedure.    Wear comfortable clothing (loose fitting pants).  You may wear glasses, dentures, contact lenses, and/or hearing aids.     Notify the nurse during the intake process if you are allergic to any medications, if you are diabetic, or if you are not feeling well      Diabetic Patients  Please check your blood sugar prior to coming in for your procedure.  If the value is greater than 200, contact the clinic [phone] as the procedure may need to be rescheduled.  The procedure may not be performed if your blood sugar is above 200 even after checking into the hospital.      IF you are taking blood thinners, please make sure our staff is aware so that we can obtain clearance and have you hold the medication accordingly.

## 2023-03-24 NOTE — PROGRESS NOTES
Subjective:     Patient ID: Ned Brock is a 62 y.o. male    Chief Complaint: Knee Pain (Right sided throbbing pain)      Referred by: Self, Aaareferral      HPI:    Initial Encounter (3/24/23):  Ned Brock is a 62 y.o. male who presents today with chronic right hip/knee pain.  Underwent right total knee replacement and continues to have pain.  Has failed I Provera.  His orthopedic surgeon feels that his knee pain may be referred from his hip.  Is noted to have degenerative changes on hip x-rays.  He was referred to me to discuss intra-articular hip injection.   This pain is described in detail below.    Physical Therapy:  Yes.    Non-pharmacologic Treatment:  Rest helps         TENS?  No    Pain Medications:         Currently taking:  Voltaren gel    Has tried in the past:  Tramadol, NSAIDs, Tylenol    Has not tried:  Muscle relaxants, TCAs, SNRIs, anticonvulsants    Blood thinners:  None    Interventional Therapies:  None    Relevant Surgeries:   Right total knee replacement    Affecting sleep?  Yes    Affecting daily activities? yes    Depressive symptoms? no          SI/HI? No    Work status: Retired    Pain Scores:    Best:       5/10  Worst:     10/10  Usually:   5/10  Today:    5/10    Pain Disability Index  Family/Home Responsibilities:: 8  Recreation:: 9  Social Activity:: 8  Occupation:: 10  Sexual Behavior:: 8  Self Care:: 6  Life-Support Activities:: 0  Pain Disability Index (PDI): 49    Review of Systems   Constitutional:  Negative for activity change, appetite change, chills, fatigue, fever and unexpected weight change.   HENT:  Negative for hearing loss.    Eyes:  Negative for visual disturbance.   Respiratory:  Negative for chest tightness and shortness of breath.    Cardiovascular:  Negative for chest pain.   Gastrointestinal:  Negative for abdominal pain, constipation, diarrhea, nausea and vomiting.   Genitourinary:  Negative for difficulty urinating.   Musculoskeletal:  Positive for  arthralgias, gait problem and myalgias. Negative for back pain and neck pain.   Skin:  Negative for rash.   Neurological:  Negative for dizziness, weakness, light-headedness, numbness and headaches.   Psychiatric/Behavioral:  Positive for sleep disturbance. Negative for hallucinations and suicidal ideas. The patient is not nervous/anxious.      Past Medical History:   Diagnosis Date    Abdominal hernia 01/2022    BPH without obstruction/lower urinary tract symptoms     Elevated PSA     Gall stones     S/P CHOLECYSTECTOMY IN 2017    Hypertension     Hypoxia     Infection of total knee replacement 06/22/2022    RIGHT, S/P SURGERGY ON 6/7/22    Obese abdomen     YANETH on CPAP     Pneumonia due to COVID-19 virus 01/12/2022    Type 2 diabetes mellitus without complication, without long-term current use of insulin        Past Surgical History:   Procedure Laterality Date    CHOLECYSTECTOMY  06/07/2017    CYST REMOVAL      EYE SURGERY Bilateral     LASIX    JOINT REPLACEMENT Right 06/07/2022       Social History     Socioeconomic History    Marital status:    Tobacco Use    Smoking status: Never    Smokeless tobacco: Never   Substance and Sexual Activity    Alcohol use: No    Drug use: No       Review of patient's allergies indicates:  No Known Allergies    Current Outpatient Medications on File Prior to Visit   Medication Sig Dispense Refill    albuterol (ACCUNEB) 0.63 mg/3 mL Nebu Take 11.9 mLs (2.499 mg total) by nebulization every 6 (six) hours as needed (wheezing/shortness of breath). Rescue 75 mL 3    albuterol (VENTOLIN HFA) 90 mcg/actuation inhaler Inhale 2 puffs into the lungs every 6 (six) hours as needed for Wheezing. Rescue 18 g 3    amlodipine-benazepril 5-20 mg (LOTREL) 5-20 mg per capsule Take 1 capsule by mouth once daily. 90 capsule 3    ascorbic acid (VITAMIN C ORAL) Take 1 capsule by mouth once daily.      citalopram (CELEXA) 40 MG tablet Take 1 tablet (40 mg total) by mouth once daily. 90 tablet 3     diclofenac sodium (VOLTAREN) 1 % Gel Apply 2 g topically 4 (four) times daily. 100 g 1    dulaglutide (TRULICITY) 1.5 mg/0.5 mL pen injector Inject 1.5 mg into the skin every 7 days. 12 pen 3    ergocalciferol, vitamin D2, (VITAMIN D ORAL) Take 1 capsule by mouth once daily.      flash glucose sensor (FREESTYLE JANETH 14 DAY SENSOR) Kit IU 1 kit 11    fluticasone propionate (FLONASE) 50 mcg/actuation nasal spray 1 spray by Each Nostril route daily as needed for Rhinitis.      guaiFENesin (MUCINEX) 600 mg 12 hr tablet Take 1,200 mg by mouth 2 (two) times daily as needed for Congestion.      metFORMIN (GLUCOPHAGE-XR) 500 MG ER 24hr tablet Take 1 tablet (500 mg total) by mouth 2 (two) times daily with meals. 180 tablet 3    pulse oximeter (PULSE OXIMETER) device Use twice daily at 8 AM and 3 PM and record the value in [employer] as directed. 1 each 0    ZINC ORAL Take 1 tablet by mouth once daily.      traMADoL (ULTRAM) 50 mg tablet Take 50 mg by mouth every 6 (six) hours.       No current facility-administered medications on file prior to visit.       Objective:      BP (!) 145/68 (BP Location: Left arm, Patient Position: Sitting, BP Method: Medium (Automatic))   Pulse 84   Resp 16   Wt 126.8 kg (279 lb 8 oz)   SpO2 95%   BMI 40.10 kg/m²     Exam:  GEN:  Well developed, well nourished.  No acute distress.   HEENT:  No trauma.  Mucous membranes moist.  Nares patent bilaterally.  PSYCH: Normal affect. Thought content appropriate.  CHEST:  Breathing symmetric.  No audible wheezing.  ABD: Soft, non-distended.  SKIN:  Warm, pink, dry.  No rash on exposed areas.    EXT:  No cyanosis, clubbing, or edema.  No color change or changes in nail or hair growth.  NEURO/MUSCULOSKELETAL:  Fully alert, oriented, and appropriate. Speech normal airam. No cranial nerve deficits.   Gait:  Antalgic.  Ambulates with single-point cane.  No focal motor deficits.       Imaging:    As per orthopedics note, degenerative changes noted right  hip on x-rays.    Assessment:       Encounter Diagnosis   Name Primary?    Right hip pain Yes         Plan:       Ned was seen today for knee pain.    Diagnoses and all orders for this visit:    Right hip pain  -     Case Request Endoscopy: Right Hip Intra-articular Steroid Injection        Ned Brock is a 62 y.o. male with chronic right knee pain following right total knee replacement.  Suspected referred pain from right intra-articular hip pain from osteoarthritis.    Schedule for right intra-articular hip injection under fluoroscopy.  I will need to discuss with his orthopedic surgeon about whether or not I should use steroid.  Follow-up with orthopedic surgeon.          This note was created by combination of typed  and M-Modal dictation. Transcription and phonetic errors may be present.  If there are any questions, please contact me.

## 2023-03-28 ENCOUNTER — TELEPHONE (OUTPATIENT)
Dept: INTERNAL MEDICINE | Facility: CLINIC | Age: 63
End: 2023-03-28
Payer: COMMERCIAL

## 2023-03-28 NOTE — TELEPHONE ENCOUNTER
----- Message from Good Guerra sent at 3/27/2023  4:18 PM CDT -----  Contact: Pt  .Type:  Needs Medical Advice    Who Called: Pt  Symptoms (please be specific): A double ear infection  Pharmacy name and phone #:  [facility] [phone]  [place], [place] - [address]  Would the patient rather a call back or a response via [facility]? [facility]  Best Call Back Number: [phone]  Additional Information:   Pt stated he has a double ear infection and is requesting medication to treat it.

## 2023-03-30 NOTE — TELEPHONE ENCOUNTER
I called the appeal line. The PA was denied due to wrong form being used. Pt ID# [id number]. I was given another number to call [phone]. I will call when I have 30 minutes of free time.

## 2023-03-31 ENCOUNTER — TELEPHONE (OUTPATIENT)
Dept: INTERNAL MEDICINE | Facility: CLINIC | Age: 63
End: 2023-03-31
Payer: COMMERCIAL

## 2023-03-31 NOTE — TELEPHONE ENCOUNTER
Finally, received an Approval letter from [employer] for Trulicity which took 2 weeks of back and forth of numerous forms submission.  Auth # [id number]. Good 3/31/2023-3/30/2024.

## 2023-03-31 NOTE — TELEPHONE ENCOUNTER
I have sent numerous faxes on the forms that his insurance is requesting. I will have to await their decision on coverage.

## 2023-04-03 ENCOUNTER — TELEPHONE (OUTPATIENT)
Dept: PAIN MEDICINE | Facility: CLINIC | Age: 63
End: 2023-04-03
Payer: COMMERCIAL

## 2023-04-03 ENCOUNTER — PATIENT MESSAGE (OUTPATIENT)
Dept: ADMINISTRATIVE | Facility: HOSPITAL | Age: 63
End: 2023-04-03
Payer: COMMERCIAL

## 2023-04-03 DIAGNOSIS — M25.551 RIGHT HIP PAIN: Primary | ICD-10-CM

## 2023-04-03 NOTE — TELEPHONE ENCOUNTER
Pt scheduled to see PCP tomorrow as he is still experiencing symptoms of ear infection.  He will touch base with me after, to see about r/s.

## 2023-04-03 NOTE — TELEPHONE ENCOUNTER
LVM and send [facility] msg asking pt to contact clinic to discuss r/s right hip injection that was cx Friday d/t ear infection- phone number included.

## 2023-04-03 NOTE — PROGRESS NOTES
Subjective     Patient ID: Ned Brock is a 62 y.o. male.    Chief Complaint: ear infection  (Follow up )    Pt of Dr. Willis here for possible ear infection.  Had a hip injection scheduled on 3-31 which was cancelled due to ear infection. Has been on Amoxicillin BID for 7 days and no relief. Has 3 more days of antibiotics.      Review of Systems   Constitutional:  Negative for chills and fever.   HENT:  Positive for ear pain, rhinorrhea and sore throat. Negative for hearing loss, sinus pressure/congestion, sneezing, tinnitus and voice change.    Eyes:  Negative for visual disturbance.   Respiratory:  Negative for chest tightness and shortness of breath.    Cardiovascular:  Negative for chest pain.   Gastrointestinal:  Negative for abdominal pain, constipation, diarrhea, nausea and vomiting.   Genitourinary:  Negative for dysuria.   Allergic/Immunologic: Negative for environmental allergies, food allergies and frequent infections.   Neurological:  Negative for dizziness, weakness, light-headedness and headaches.   Hematological:  Negative for adenopathy. Does not bruise/bleed easily.   Psychiatric/Behavioral:  Negative for suicidal ideas.      Review of patient's allergies indicates:  No Known Allergies     Current Outpatient Medications:     albuterol (ACCUNEB) 0.63 mg/3 mL Nebu, Take 11.9 mLs (2.499 mg total) by nebulization every 6 (six) hours as needed (wheezing/shortness of breath). Rescue, Disp: 75 mL, Rfl: 3    albuterol (VENTOLIN HFA) 90 mcg/actuation inhaler, Inhale 2 puffs into the lungs every 6 (six) hours as needed for Wheezing. Rescue, Disp: 18 g, Rfl: 3    amlodipine-benazepril 5-20 mg (LOTREL) 5-20 mg per capsule, Take 1 capsule by mouth once daily., Disp: 90 capsule, Rfl: 3    amoxicillin (AMOXIL) 875 MG tablet, Take 875 mg by mouth every 12 (twelve) hours., Disp: , Rfl:     ascorbic acid (VITAMIN C ORAL), Take 1 capsule by mouth once daily., Disp: , Rfl:     citalopram (CELEXA) 40 MG tablet, Take 1  tablet (40 mg total) by mouth once daily., Disp: 90 tablet, Rfl: 3    diclofenac sodium (VOLTAREN) 1 % Gel, Apply 2 g topically 4 (four) times daily., Disp: 100 g, Rfl: 1    dulaglutide (TRULICITY) 1.5 mg/0.5 mL pen injector, Inject 1.5 mg into the skin every 7 days., Disp: 12 pen, Rfl: 3    ergocalciferol, vitamin D2, (VITAMIN D ORAL), Take 1 capsule by mouth once daily., Disp: , Rfl:     flash glucose sensor (FREESTYLE JANETH 14 DAY SENSOR) Kit, IU, Disp: 1 kit, Rfl: 11    fluticasone propionate (FLONASE) 50 mcg/actuation nasal spray, 1 spray by Each Nostril route daily as needed for Rhinitis., Disp: , Rfl:     guaiFENesin (MUCINEX) 600 mg 12 hr tablet, Take 1,200 mg by mouth 2 (two) times daily as needed for Congestion., Disp: , Rfl:     metFORMIN (GLUCOPHAGE-XR) 500 MG ER 24hr tablet, Take 1 tablet (500 mg total) by mouth 2 (two) times daily with meals., Disp: 180 tablet, Rfl: 3    pulse oximeter (PULSE OXIMETER) device, Use twice daily at 8 AM and 3 PM and record the value in MyChart as directed., Disp: 1 each, Rfl: 0    traMADoL (ULTRAM) 50 mg tablet, Take 50 mg by mouth every 6 (six) hours., Disp: , Rfl:     ZINC ORAL, Take 1 tablet by mouth once daily., Disp: , Rfl:     Past Medical History:   Diagnosis Date    Abdominal hernia 01/2022    BPH without obstruction/lower urinary tract symptoms     Elevated PSA     Gall stones     S/P CHOLECYSTECTOMY IN 2017    Hypertension     Hypoxia     Infection of total knee replacement 06/22/2022    RIGHT, S/P SURGERGY ON 6/7/22    Obese abdomen     YANETH on CPAP     Pneumonia due to COVID-19 virus 01/12/2022    Type 2 diabetes mellitus without complication, without long-term current use of insulin       Past Surgical History:   Procedure Laterality Date    CHOLECYSTECTOMY  06/07/2017    CYST REMOVAL      EPIDURAL STEROID INJECTION Right 3/31/2023    Procedure: Right Hip Intra-articular Steroid Injection;  Surgeon: Juanito Ann Jr., MD;  Location: [facility];  Service:  "Pain Management;  Laterality: Right;  @1400  No ATC  Check BG    EYE SURGERY Bilateral     LASIX    JOINT REPLACEMENT Right 06/07/2022      Social History     Socioeconomic History    Marital status:    Tobacco Use    Smoking status: Never    Smokeless tobacco: Never   Substance and Sexual Activity    Alcohol use: No    Drug use: No      Past Surgical History:   Procedure Laterality Date    CHOLECYSTECTOMY  06/07/2017    CYST REMOVAL      EPIDURAL STEROID INJECTION Right 3/31/2023    Procedure: Right Hip Intra-articular Steroid Injection;  Surgeon: Juanito Ann Jr., MD;  Location: [facility];  Service: Pain Management;  Laterality: Right;  @1400  No ATC  Check BG    EYE SURGERY Bilateral     LASIX    JOINT REPLACEMENT Right 06/07/2022         Objective     Vitals:    04/04/23 1527   BP: 136/78   Pulse: 90   Resp: 18   SpO2: 96%   Weight: 128.1 kg (282 lb 6.6 oz)   Height: 5' 10" (1.778 m)   PainSc:   4   PainLoc: Knee      Body mass index is 40.52 kg/m².     Physical Exam  Vitals and nursing note reviewed.   Constitutional:       Appearance: He is obese.   HENT:      Head: Normocephalic.      Right Ear: Tympanic membrane normal. Tenderness present. No drainage or swelling. There is no impacted cerumen. There is mastoid tenderness.      Left Ear: Tympanic membrane normal. Tenderness present. No drainage or swelling. There is no impacted cerumen. There is mastoid tenderness.      Nose: Nose normal.      Mouth/Throat:      Mouth: Mucous membranes are moist.      Pharynx: Oropharynx is clear.   Eyes:      Conjunctiva/sclera: Conjunctivae normal.   Cardiovascular:      Pulses: Normal pulses.      Heart sounds: Normal heart sounds.   Pulmonary:      Effort: Pulmonary effort is normal.      Breath sounds: Normal breath sounds.   Musculoskeletal:      Cervical back: Normal range of motion.      Comments: Walks with walking cane   Skin:     General: Skin is warm and dry.   Neurological:      General: No focal " deficit present.      Mental Status: He is alert and oriented to person, place, and time.   Psychiatric:         Mood and Affect: Mood normal.         Behavior: Behavior normal.         Thought Content: Thought content normal.         Judgment: Judgment normal.          Assessment and Plan     Problem List Items Addressed This Visit    None  Visit Diagnoses       Infective otitis externa, bilateral    -  Primary    Relevant Medications    neomycin-polymyxin-hydrocortisone (CORTISPORIN) 3.5-10,000-1 mg/mL-unit/mL-% otic suspension    BMI 40.0-44.9, adult        Morbidly obese                Ned was seen today for ear infection .    Diagnoses and all orders for this visit:    Infective otitis externa, bilateral  -     neomycin-polymyxin-hydrocortisone (CORTISPORIN) 3.5-10,000-1 mg/mL-unit/mL-% otic suspension; Place 4 drops into both ears 2 (two) times a day. for 7 days    BMI 40.0-44.9, adult  BMI reviewed    Morbidly obese  BMI reviewed.    Diet and exercise to lose weight.    Finish out amoxicillin    Add Cortisporin otic drops 4 drops in both ears twice a day for 7 days    Avoid excess water in ears    Self care instructions provided in AVS    Follow up if symptoms worsen or fail to improve.

## 2023-04-04 ENCOUNTER — OFFICE VISIT (OUTPATIENT)
Dept: INTERNAL MEDICINE | Facility: CLINIC | Age: 63
End: 2023-04-04
Payer: COMMERCIAL

## 2023-04-04 VITALS
SYSTOLIC BLOOD PRESSURE: 136 MMHG | RESPIRATION RATE: 18 BRPM | HEART RATE: 90 BPM | WEIGHT: 282.44 LBS | BODY MASS INDEX: 40.43 KG/M2 | OXYGEN SATURATION: 96 % | HEIGHT: 70 IN | DIASTOLIC BLOOD PRESSURE: 78 MMHG

## 2023-04-04 DIAGNOSIS — E66.01 MORBIDLY OBESE: ICD-10-CM

## 2023-04-04 DIAGNOSIS — H60.393 INFECTIVE OTITIS EXTERNA, BILATERAL: Primary | ICD-10-CM

## 2023-04-04 PROCEDURE — 1160F RVW MEDS BY RX/DR IN RCRD: CPT | Mod: CPTII,S$GLB,, | Performed by: NURSE PRACTITIONER

## 2023-04-04 PROCEDURE — 3075F SYST BP GE 130 - 139MM HG: CPT | Mod: CPTII,S$GLB,, | Performed by: NURSE PRACTITIONER

## 2023-04-04 PROCEDURE — 99999 PR PBB SHADOW E&M-EST. PATIENT-LVL V: CPT | Mod: PBBFAC,,, | Performed by: NURSE PRACTITIONER

## 2023-04-04 PROCEDURE — 3008F BODY MASS INDEX DOCD: CPT | Mod: CPTII,S$GLB,, | Performed by: NURSE PRACTITIONER

## 2023-04-04 PROCEDURE — 1159F PR MEDICATION LIST DOCUMENTED IN MEDICAL RECORD: ICD-10-PCS | Mod: CPTII,S$GLB,, | Performed by: NURSE PRACTITIONER

## 2023-04-04 PROCEDURE — 1160F PR REVIEW ALL MEDS BY PRESCRIBER/CLIN PHARMACIST DOCUMENTED: ICD-10-PCS | Mod: CPTII,S$GLB,, | Performed by: NURSE PRACTITIONER

## 2023-04-04 PROCEDURE — 1159F MED LIST DOCD IN RCRD: CPT | Mod: CPTII,S$GLB,, | Performed by: NURSE PRACTITIONER

## 2023-04-04 PROCEDURE — 3075F PR MOST RECENT SYSTOLIC BLOOD PRESS GE 130-139MM HG: ICD-10-PCS | Mod: CPTII,S$GLB,, | Performed by: NURSE PRACTITIONER

## 2023-04-04 PROCEDURE — 99214 OFFICE O/P EST MOD 30 MIN: CPT | Mod: S$GLB,,, | Performed by: NURSE PRACTITIONER

## 2023-04-04 PROCEDURE — 4010F PR ACE/ARB THEARPY RXD/TAKEN: ICD-10-PCS | Mod: CPTII,S$GLB,, | Performed by: NURSE PRACTITIONER

## 2023-04-04 PROCEDURE — 4010F ACE/ARB THERAPY RXD/TAKEN: CPT | Mod: CPTII,S$GLB,, | Performed by: NURSE PRACTITIONER

## 2023-04-04 PROCEDURE — 99214 PR OFFICE/OUTPT VISIT, EST, LEVL IV, 30-39 MIN: ICD-10-PCS | Mod: S$GLB,,, | Performed by: NURSE PRACTITIONER

## 2023-04-04 PROCEDURE — 3044F HG A1C LEVEL LT 7.0%: CPT | Mod: CPTII,S$GLB,, | Performed by: NURSE PRACTITIONER

## 2023-04-04 PROCEDURE — 3078F DIAST BP <80 MM HG: CPT | Mod: CPTII,S$GLB,, | Performed by: NURSE PRACTITIONER

## 2023-04-04 PROCEDURE — 99999 PR PBB SHADOW E&M-EST. PATIENT-LVL V: ICD-10-PCS | Mod: PBBFAC,,, | Performed by: NURSE PRACTITIONER

## 2023-04-04 PROCEDURE — 3078F PR MOST RECENT DIASTOLIC BLOOD PRESSURE < 80 MM HG: ICD-10-PCS | Mod: CPTII,S$GLB,, | Performed by: NURSE PRACTITIONER

## 2023-04-04 PROCEDURE — 3008F PR BODY MASS INDEX (BMI) DOCUMENTED: ICD-10-PCS | Mod: CPTII,S$GLB,, | Performed by: NURSE PRACTITIONER

## 2023-04-04 PROCEDURE — 3044F PR MOST RECENT HEMOGLOBIN A1C LEVEL <7.0%: ICD-10-PCS | Mod: CPTII,S$GLB,, | Performed by: NURSE PRACTITIONER

## 2023-04-04 RX ORDER — AMOXICILLIN 875 MG/1
875 TABLET, FILM COATED ORAL
COMMUNITY
End: 2024-02-29

## 2023-04-04 RX ORDER — NEOMYCIN SULFATE, POLYMYXIN B SULFATE AND HYDROCORTISONE 10; 3.5; 1 MG/ML; MG/ML; [USP'U]/ML
4 SUSPENSION/ DROPS AURICULAR (OTIC) 2 TIMES DAILY
Qty: 4 ML | Refills: 0 | Status: SHIPPED | OUTPATIENT
Start: 2023-04-04 | End: 2023-04-11

## 2023-04-04 NOTE — PATIENT INSTRUCTIONS
Finish out amoxicillin    Add Cortisporin otic drops 4 drops in both ears twice a day for 7 days    Avoid excess water in ears

## 2023-04-06 ENCOUNTER — TELEPHONE (OUTPATIENT)
Dept: PAIN MEDICINE | Facility: CLINIC | Age: 63
End: 2023-04-06
Payer: COMMERCIAL

## 2023-04-06 NOTE — TELEPHONE ENCOUNTER
Mr. Brock would like to schedule the previously cx injection on 4/28/23.  He is currently using ear drops x7 days (started 4/4/23) for an ear infection.

## 2023-04-06 NOTE — TELEPHONE ENCOUNTER
----- Message from Vicki Enriquez sent at 4/6/2023 11:16 AM CDT -----  Type: Patient Call Back    Who called:pt     What is the request in detail:pt requesting to speak to Shannon in regards to pain management. Wouldn't say about what. Call pt     Can the clinic reply by [facility]?    Would the patient rather a call back or a response via [facility]? call    Best call back number:[phone] (home)       Additional Information:

## 2023-04-19 ENCOUNTER — PATIENT MESSAGE (OUTPATIENT)
Dept: INTERNAL MEDICINE | Facility: CLINIC | Age: 63
End: 2023-04-19
Payer: COMMERCIAL

## 2023-04-19 DIAGNOSIS — H60.393 INFECTIVE OTITIS EXTERNA, BILATERAL: Primary | ICD-10-CM

## 2023-04-19 DIAGNOSIS — E11.9 TYPE 2 DIABETES MELLITUS WITHOUT COMPLICATION: ICD-10-CM

## 2023-04-20 ENCOUNTER — TELEPHONE (OUTPATIENT)
Dept: PAIN MEDICINE | Facility: CLINIC | Age: 63
End: 2023-04-20
Payer: COMMERCIAL

## 2023-04-20 NOTE — TELEPHONE ENCOUNTER
Message left with review of pre-procedure instructions, as well as provided arrival time of 2:45pm.  Information was also sent via [employer].  Asked that patient call clinic to confirm- phone number included.

## 2023-04-21 ENCOUNTER — TELEPHONE (OUTPATIENT)
Dept: PAIN MEDICINE | Facility: CLINIC | Age: 63
End: 2023-04-21
Payer: COMMERCIAL

## 2023-04-21 ENCOUNTER — PATIENT MESSAGE (OUTPATIENT)
Dept: ADMINISTRATIVE | Facility: HOSPITAL | Age: 63
End: 2023-04-21
Payer: COMMERCIAL

## 2023-04-21 NOTE — TELEPHONE ENCOUNTER
Message left with review of pre-procedure instructions, as well as provided arrival time of 2:45pm.  Information was also sent via [employer] yesterday.  Asked that patient call clinic to confirm- phone number included.

## 2023-04-24 ENCOUNTER — TELEPHONE (OUTPATIENT)
Dept: PAIN MEDICINE | Facility: CLINIC | Age: 63
End: 2023-04-24
Payer: COMMERCIAL

## 2023-04-24 ENCOUNTER — PATIENT MESSAGE (OUTPATIENT)
Dept: ADMINISTRATIVE | Facility: HOSPITAL | Age: 63
End: 2023-04-24
Payer: COMMERCIAL

## 2023-04-24 NOTE — TELEPHONE ENCOUNTER
Message left with review of pre-procedure instructions, as well as provided arrival time of 2:45pm.  Information was also sent via [employer].  Asked that patient call clinic to confirm- phone number included.

## 2023-04-24 NOTE — TELEPHONE ENCOUNTER
Mr. Marino called to report persistent symptoms of ear infection and sore throat.  He does have an appt with ENT 5/5/23.  Encouraged him to f/u with us after that appt, the procedure will be postponed for now.

## 2023-05-04 RX ORDER — CIPROFLOXACIN AND DEXAMETHASONE 3; 1 MG/ML; MG/ML
4 SUSPENSION/ DROPS AURICULAR (OTIC) 2 TIMES DAILY
Qty: 7.5 ML | Refills: 0 | Status: CANCELLED | OUTPATIENT
Start: 2023-05-04 | End: 2023-05-14

## 2023-05-04 NOTE — PROGRESS NOTES
Chief Complaint   Patient presents with    Ear Fullness     bilateral    Otalgia     bilateral   .     HPI: Ned Brock is 62 y.o. male who isreferred by Rubi Norman DNP  for evaluation of bilateral ear pain. The pain has been an intermittent throbbing. He notes ear fullness bilaterally.  He was seen on 4/3/23 and treated for bilateral otitis externa with cortisporin otic drops. Prior to that he was treated with clindamycin and amoxil.   He denies otorrhea.  He has been on Flonase, mucinex, and decongestants without relief. Symptoms began 8 weeks ago and are unchanged since that time. He has not noted hearing loss. He does have history of occupational noise exposure. He does wear CPAP nightly.           Past Medical History:   Diagnosis Date    Abdominal hernia 01/2022    BPH without obstruction/lower urinary tract symptoms     Elevated PSA     Gall stones     S/P CHOLECYSTECTOMY IN 2017    Hypertension     Hypoxia     Infection of total knee replacement 06/22/2022    RIGHT, S/P SURGERGY ON 6/7/22    Obese abdomen     AYNETH on CPAP     Pneumonia due to COVID-19 virus 01/12/2022    Type 2 diabetes mellitus without complication, without long-term current use of insulin      Social History     Socioeconomic History    Marital status:    Tobacco Use    Smoking status: Never    Smokeless tobacco: Never   Substance and Sexual Activity    Alcohol use: No    Drug use: No     Past Surgical History:   Procedure Laterality Date    CHOLECYSTECTOMY  06/07/2017    CYST REMOVAL      EPIDURAL STEROID INJECTION Right 3/31/2023    Procedure: Right Hip Intra-articular Steroid Injection;  Surgeon: Juanito Ann Jr., MD;  Location: [facility];  Service: Pain Management;  Laterality: Right;  @1400  No ATC  Check BG    EYE SURGERY Bilateral     LASIX    JOINT REPLACEMENT Right 06/07/2022     Family History   Problem Relation Age of Onset    Breast cancer Mother            Review of Systems  General: negative for chills,  fever or weight loss  Psychological: negative for mood changes or depression  Ophthalmic: negative for blurry vision, photophobia or eye pain  ENT: see HPI  Respiratory: no cough, shortness of breath, or wheezing  Cardiovascular: no chest pain or dyspnea on exertion  Gastrointestinal: no abdominal pain, change in bowel habits, or black/ bloody stools  Musculoskeletal: negative for gait disturbance or muscular weakness  Neurological: no syncope or seizures; no ataxia  Dermatological: negative for puritis,  rash and jaundice  Hematologic/lymphatic: no easy bruising, no new lumps or bumps      Physical Exam:    Vitals:    05/05/23 1002   BP: 128/74   Pulse: 76       Constitutional: Well appearing / communicating without difficutly.  NAD.  Eyes: EOM I Bilaterally  Head/Face: Normocephalic.  Negative paranasal sinus pressure/tenderness.  Salivary glands WNL.  House Brackmann I Bilaterally.    Right Ear: Auricle normal appearance. External Auditory Canal within normal limits no lesions or masses,TM w/o masses/lesions/perforations. TM mobility noted.   Left Ear: Auricle normal appearance. External Auditory Canal within normal limits no lesions or masses,TM w/o masses/lesions/perforations. TM mobility noted.  Nose: No gross nasal septal deviation. Inferior Turbinates 3+ bilaterally. No septal perforation. No masses/lesions. External nasal skin appears normal without masses/lesions.  Oral Cavity: Gingiva/lips within normal limits.  Dentition/gingiva healthy appearing. Mucus membranes moist. Floor of mouth soft, no masses palpated. Oral Tongue mobile. Hard Palate appears normal.    Oropharynx: Base of tongue appears normal. No masses/lesions noted. Tonsillar fossa/pharyngeal wall without lesions. Posterior oropharynx WNL.  Soft palate without masses. Midline uvula.   Neck/Lymphatic: No LAD I-VI bilaterally.  No thyromegaly.  No masses noted on exam.        Diagnostic testing reviewed:    Audiogram interpreted personally by me  and discussed in detail with the patient today. Tympanometry revealed a Type As tympanogram for the left ear and a Type A tympanogram for the right ear.  Audiogram results revealed a mild sloping to moderate sensorineural hearing loss for the right ear and a mild sloping to severe sensorineural hearing loss for the left ear.  Speech reception thresholds were obtained at 20dB for both ears and speech discrimination scores were 84% for the right ear and 76% for the left ear.            Assessment:    ICD-10-CM ICD-9-CM    1. Diffuse otitis externa, unspecified chronicity, unspecified laterality  H60.319 380.10       2. Otalgia of both ears  H92.03 388.70       3. Bilateral temporomandibular joint pain  M26.623 524.62 Ambulatory referral/consult to ENT      4. Chronic allergic rhinitis  J30.9 477.9       5. Sensorineural hearing loss (SNHL), bilateral  H90.3 389.18         The primary encounter diagnosis was Diffuse otitis externa, unspecified chronicity, unspecified laterality. Diagnoses of Otalgia of both ears, Bilateral temporomandibular joint pain, Chronic allergic rhinitis, and Sensorineural hearing loss (SNHL), bilateral were also pertinent to this visit.      Plan:  No orders of the defined types were placed in this encounter.      Keep ears dry  Start Lotrimin otic drops to affected ear BID  Continue Flonase  Start Astelin 1 spray per nostril BID  Start xyzal 10mg PO daily  We had a long discussion regarding the underlying pathology of temporomandibular joint dysfunction (TMD) as the cause of ear pain.  We further discussed conservative measures to treat TMD including avoiding gum and other foods that require lots of chewing, warm compresses, and scheduled antinflammatories. Start naproxen 500 mg PO BID for 10 days; start medrol dose savannah If the pain persists, the patient will then schedule an appointment with a dentist for further evaluation.    Follow up in 3-4 weeks with shoaib Hedrick  MD

## 2023-05-05 ENCOUNTER — OFFICE VISIT (OUTPATIENT)
Dept: OTOLARYNGOLOGY | Facility: CLINIC | Age: 63
End: 2023-05-05
Payer: COMMERCIAL

## 2023-05-05 ENCOUNTER — CLINICAL SUPPORT (OUTPATIENT)
Dept: OTOLARYNGOLOGY | Facility: CLINIC | Age: 63
End: 2023-05-05
Payer: COMMERCIAL

## 2023-05-05 VITALS
SYSTOLIC BLOOD PRESSURE: 128 MMHG | DIASTOLIC BLOOD PRESSURE: 74 MMHG | WEIGHT: 271.5 LBS | HEART RATE: 76 BPM | BODY MASS INDEX: 38.96 KG/M2

## 2023-05-05 DIAGNOSIS — H90.3 SENSORINEURAL HEARING LOSS, BILATERAL: Primary | ICD-10-CM

## 2023-05-05 DIAGNOSIS — J30.9 CHRONIC ALLERGIC RHINITIS: Chronic | ICD-10-CM

## 2023-05-05 DIAGNOSIS — H90.3 SENSORINEURAL HEARING LOSS (SNHL), BILATERAL: Chronic | ICD-10-CM

## 2023-05-05 DIAGNOSIS — H60.319 DIFFUSE OTITIS EXTERNA, UNSPECIFIED CHRONICITY, UNSPECIFIED LATERALITY: Primary | ICD-10-CM

## 2023-05-05 DIAGNOSIS — M26.623 BILATERAL TEMPOROMANDIBULAR JOINT PAIN: ICD-10-CM

## 2023-05-05 DIAGNOSIS — H92.03 OTALGIA OF BOTH EARS: ICD-10-CM

## 2023-05-05 PROCEDURE — 3078F PR MOST RECENT DIASTOLIC BLOOD PRESSURE < 80 MM HG: ICD-10-PCS | Mod: CPTII,S$GLB,, | Performed by: OTOLARYNGOLOGY

## 2023-05-05 PROCEDURE — 3008F PR BODY MASS INDEX (BMI) DOCUMENTED: ICD-10-PCS | Mod: CPTII,S$GLB,, | Performed by: OTOLARYNGOLOGY

## 2023-05-05 PROCEDURE — 99204 PR OFFICE/OUTPT VISIT, NEW, LEVL IV, 45-59 MIN: ICD-10-PCS | Mod: 25,S$GLB,, | Performed by: OTOLARYNGOLOGY

## 2023-05-05 PROCEDURE — 1160F PR REVIEW ALL MEDS BY PRESCRIBER/CLIN PHARMACIST DOCUMENTED: ICD-10-PCS | Mod: CPTII,S$GLB,, | Performed by: OTOLARYNGOLOGY

## 2023-05-05 PROCEDURE — 99999 PR PBB SHADOW E&M-EST. PATIENT-LVL IV: CPT | Mod: PBBFAC,,, | Performed by: OTOLARYNGOLOGY

## 2023-05-05 PROCEDURE — 92567 PR TYMPA2METRY: ICD-10-PCS | Mod: S$GLB,,, | Performed by: AUDIOLOGIST

## 2023-05-05 PROCEDURE — 1159F PR MEDICATION LIST DOCUMENTED IN MEDICAL RECORD: ICD-10-PCS | Mod: CPTII,S$GLB,, | Performed by: OTOLARYNGOLOGY

## 2023-05-05 PROCEDURE — 3044F PR MOST RECENT HEMOGLOBIN A1C LEVEL <7.0%: ICD-10-PCS | Mod: CPTII,S$GLB,, | Performed by: OTOLARYNGOLOGY

## 2023-05-05 PROCEDURE — 99999 PR PBB SHADOW E&M-EST. PATIENT-LVL I: CPT | Mod: PBBFAC,,, | Performed by: AUDIOLOGIST

## 2023-05-05 PROCEDURE — 69210 REMOVE IMPACTED EAR WAX UNI: CPT | Mod: S$GLB,,, | Performed by: OTOLARYNGOLOGY

## 2023-05-05 PROCEDURE — 92557 COMPREHENSIVE HEARING TEST: CPT | Mod: S$GLB,,, | Performed by: AUDIOLOGIST

## 2023-05-05 PROCEDURE — 1159F MED LIST DOCD IN RCRD: CPT | Mod: CPTII,S$GLB,, | Performed by: OTOLARYNGOLOGY

## 2023-05-05 PROCEDURE — 92557 PR COMPREHENSIVE HEARING TEST: ICD-10-PCS | Mod: S$GLB,,, | Performed by: AUDIOLOGIST

## 2023-05-05 PROCEDURE — 99999 PR PBB SHADOW E&M-EST. PATIENT-LVL I: ICD-10-PCS | Mod: PBBFAC,,, | Performed by: AUDIOLOGIST

## 2023-05-05 PROCEDURE — 3078F DIAST BP <80 MM HG: CPT | Mod: CPTII,S$GLB,, | Performed by: OTOLARYNGOLOGY

## 2023-05-05 PROCEDURE — 3044F HG A1C LEVEL LT 7.0%: CPT | Mod: CPTII,S$GLB,, | Performed by: OTOLARYNGOLOGY

## 2023-05-05 PROCEDURE — 4010F ACE/ARB THERAPY RXD/TAKEN: CPT | Mod: CPTII,S$GLB,, | Performed by: OTOLARYNGOLOGY

## 2023-05-05 PROCEDURE — 69210 PR REMOVAL IMPACTED CERUMEN REQUIRING INSTRUMENTATION, UNILATERAL: ICD-10-PCS | Mod: S$GLB,,, | Performed by: OTOLARYNGOLOGY

## 2023-05-05 PROCEDURE — 3074F PR MOST RECENT SYSTOLIC BLOOD PRESSURE < 130 MM HG: ICD-10-PCS | Mod: CPTII,S$GLB,, | Performed by: OTOLARYNGOLOGY

## 2023-05-05 PROCEDURE — 99204 OFFICE O/P NEW MOD 45 MIN: CPT | Mod: 25,S$GLB,, | Performed by: OTOLARYNGOLOGY

## 2023-05-05 PROCEDURE — 1160F RVW MEDS BY RX/DR IN RCRD: CPT | Mod: CPTII,S$GLB,, | Performed by: OTOLARYNGOLOGY

## 2023-05-05 PROCEDURE — 3008F BODY MASS INDEX DOCD: CPT | Mod: CPTII,S$GLB,, | Performed by: OTOLARYNGOLOGY

## 2023-05-05 PROCEDURE — 99999 PR PBB SHADOW E&M-EST. PATIENT-LVL IV: ICD-10-PCS | Mod: PBBFAC,,, | Performed by: OTOLARYNGOLOGY

## 2023-05-05 PROCEDURE — 92567 TYMPANOMETRY: CPT | Mod: S$GLB,,, | Performed by: AUDIOLOGIST

## 2023-05-05 PROCEDURE — 3074F SYST BP LT 130 MM HG: CPT | Mod: CPTII,S$GLB,, | Performed by: OTOLARYNGOLOGY

## 2023-05-05 PROCEDURE — 4010F PR ACE/ARB THEARPY RXD/TAKEN: ICD-10-PCS | Mod: CPTII,S$GLB,, | Performed by: OTOLARYNGOLOGY

## 2023-05-05 RX ORDER — METHYLPREDNISOLONE 4 MG/1
TABLET ORAL
Qty: 1 EACH | Refills: 0 | Status: SHIPPED | OUTPATIENT
Start: 2023-05-05 | End: 2024-02-29

## 2023-05-05 RX ORDER — NAPROXEN 500 MG/1
500 TABLET ORAL 2 TIMES DAILY WITH MEALS
Qty: 20 TABLET | Refills: 0 | Status: SHIPPED | OUTPATIENT
Start: 2023-05-05 | End: 2023-05-15

## 2023-05-05 RX ORDER — CLOTRIMAZOLE 1 G/ML
SOLUTION TOPICAL 2 TIMES DAILY
Qty: 10 ML | Refills: 0 | Status: SHIPPED | OUTPATIENT
Start: 2023-05-05 | End: 2024-02-29

## 2023-05-05 NOTE — PROGRESS NOTES
Ned Brock was seen today in the clinic for an audiologic evaluation.  His main complaint was ear pain and infection in both ears for the past month.    Tympanometry revealed a Type As tympanogram for the left ear and a Type A tympanogram for the right ear.  Audiogram results revealed a mild sloping to moderate sensorineural hearing loss for the right ear and a mild sloping to severe sensorineural hearing loss for the left ear.  Speech reception thresholds were obtained at 20dB for both ears and speech discrimination scores were 84% for the right ear and 76% for the left ear.    Recommendations:  Otologic evaluation  Annual evaluation  Hearing aid consult when ready  Hearing protection in noise

## 2023-05-08 ENCOUNTER — TELEPHONE (OUTPATIENT)
Dept: INTERNAL MEDICINE | Facility: CLINIC | Age: 63
End: 2023-05-08
Payer: COMMERCIAL

## 2023-06-09 ENCOUNTER — CLINICAL SUPPORT (OUTPATIENT)
Dept: OTOLARYNGOLOGY | Facility: CLINIC | Age: 63
End: 2023-06-09
Payer: COMMERCIAL

## 2023-06-09 ENCOUNTER — OFFICE VISIT (OUTPATIENT)
Dept: OTOLARYNGOLOGY | Facility: CLINIC | Age: 63
End: 2023-06-09
Payer: COMMERCIAL

## 2023-06-09 VITALS
HEART RATE: 80 BPM | BODY MASS INDEX: 40.9 KG/M2 | DIASTOLIC BLOOD PRESSURE: 94 MMHG | SYSTOLIC BLOOD PRESSURE: 181 MMHG | WEIGHT: 285.06 LBS | TEMPERATURE: 98 F

## 2023-06-09 DIAGNOSIS — J30.9 CHRONIC ALLERGIC RHINITIS: Chronic | ICD-10-CM

## 2023-06-09 DIAGNOSIS — H60.319 DIFFUSE OTITIS EXTERNA, UNSPECIFIED CHRONICITY, UNSPECIFIED LATERALITY: ICD-10-CM

## 2023-06-09 DIAGNOSIS — M26.623 BILATERAL TEMPOROMANDIBULAR JOINT PAIN: Chronic | ICD-10-CM

## 2023-06-09 DIAGNOSIS — H90.3 SENSORINEURAL HEARING LOSS, BILATERAL: Primary | Chronic | ICD-10-CM

## 2023-06-09 DIAGNOSIS — H93.293 ABNORMAL AUDITORY PERCEPTION, BILATERAL: Primary | ICD-10-CM

## 2023-06-09 PROCEDURE — 99214 PR OFFICE/OUTPT VISIT, EST, LEVL IV, 30-39 MIN: ICD-10-PCS | Mod: S$GLB,,, | Performed by: OTOLARYNGOLOGY

## 2023-06-09 PROCEDURE — 3044F HG A1C LEVEL LT 7.0%: CPT | Mod: CPTII,S$GLB,, | Performed by: OTOLARYNGOLOGY

## 2023-06-09 PROCEDURE — 3080F DIAST BP >= 90 MM HG: CPT | Mod: CPTII,S$GLB,, | Performed by: OTOLARYNGOLOGY

## 2023-06-09 PROCEDURE — 3008F BODY MASS INDEX DOCD: CPT | Mod: CPTII,S$GLB,, | Performed by: OTOLARYNGOLOGY

## 2023-06-09 PROCEDURE — 3077F PR MOST RECENT SYSTOLIC BLOOD PRESSURE >= 140 MM HG: ICD-10-PCS | Mod: CPTII,S$GLB,, | Performed by: OTOLARYNGOLOGY

## 2023-06-09 PROCEDURE — 3080F PR MOST RECENT DIASTOLIC BLOOD PRESSURE >= 90 MM HG: ICD-10-PCS | Mod: CPTII,S$GLB,, | Performed by: OTOLARYNGOLOGY

## 2023-06-09 PROCEDURE — 1159F PR MEDICATION LIST DOCUMENTED IN MEDICAL RECORD: ICD-10-PCS | Mod: CPTII,S$GLB,, | Performed by: OTOLARYNGOLOGY

## 2023-06-09 PROCEDURE — 99999 PR PBB SHADOW E&M-EST. PATIENT-LVL IV: CPT | Mod: PBBFAC,,, | Performed by: OTOLARYNGOLOGY

## 2023-06-09 PROCEDURE — 99999 PR PBB SHADOW E&M-EST. PATIENT-LVL I: ICD-10-PCS | Mod: PBBFAC,,, | Performed by: AUDIOLOGIST

## 2023-06-09 PROCEDURE — 1160F PR REVIEW ALL MEDS BY PRESCRIBER/CLIN PHARMACIST DOCUMENTED: ICD-10-PCS | Mod: CPTII,S$GLB,, | Performed by: OTOLARYNGOLOGY

## 2023-06-09 PROCEDURE — 99999 PR PBB SHADOW E&M-EST. PATIENT-LVL IV: ICD-10-PCS | Mod: PBBFAC,,, | Performed by: OTOLARYNGOLOGY

## 2023-06-09 PROCEDURE — 3044F PR MOST RECENT HEMOGLOBIN A1C LEVEL <7.0%: ICD-10-PCS | Mod: CPTII,S$GLB,, | Performed by: OTOLARYNGOLOGY

## 2023-06-09 PROCEDURE — 3008F PR BODY MASS INDEX (BMI) DOCUMENTED: ICD-10-PCS | Mod: CPTII,S$GLB,, | Performed by: OTOLARYNGOLOGY

## 2023-06-09 PROCEDURE — 99999 PR PBB SHADOW E&M-EST. PATIENT-LVL I: CPT | Mod: PBBFAC,,, | Performed by: AUDIOLOGIST

## 2023-06-09 PROCEDURE — 92567 TYMPANOMETRY: CPT | Mod: S$GLB,,, | Performed by: AUDIOLOGIST

## 2023-06-09 PROCEDURE — 92567 PR TYMPA2METRY: ICD-10-PCS | Mod: S$GLB,,, | Performed by: AUDIOLOGIST

## 2023-06-09 PROCEDURE — 3077F SYST BP >= 140 MM HG: CPT | Mod: CPTII,S$GLB,, | Performed by: OTOLARYNGOLOGY

## 2023-06-09 PROCEDURE — 1160F RVW MEDS BY RX/DR IN RCRD: CPT | Mod: CPTII,S$GLB,, | Performed by: OTOLARYNGOLOGY

## 2023-06-09 PROCEDURE — 4010F ACE/ARB THERAPY RXD/TAKEN: CPT | Mod: CPTII,S$GLB,, | Performed by: OTOLARYNGOLOGY

## 2023-06-09 PROCEDURE — 99214 OFFICE O/P EST MOD 30 MIN: CPT | Mod: S$GLB,,, | Performed by: OTOLARYNGOLOGY

## 2023-06-09 PROCEDURE — 1159F MED LIST DOCD IN RCRD: CPT | Mod: CPTII,S$GLB,, | Performed by: OTOLARYNGOLOGY

## 2023-06-09 PROCEDURE — 4010F PR ACE/ARB THEARPY RXD/TAKEN: ICD-10-PCS | Mod: CPTII,S$GLB,, | Performed by: OTOLARYNGOLOGY

## 2023-06-09 NOTE — PROGRESS NOTES
Chief Complaint   Patient presents with    Follow-up   .     HPI: Ned Brock is 62 y.o. male who isreferred by Rubi Norman DNP  for evaluation of bilateral ear pain. The pain has been an intermittent throbbing. He notes ear fullness bilaterally.  He was seen on 4/3/23 and treated for bilateral otitis externa with cortisporin otic drops. Prior to that he was treated with clindamycin and amoxil.   He denies otorrhea.  He has been on Flonase, mucinex, and decongestants without relief. Symptoms began 8 weeks ago and are unchanged since that time. He has not noted hearing loss. He does have history of occupational noise exposure. He does wear CPAP nightly.     Interval HPI 6/9/2023:  Follow up visit. Reports that he has used lotrimin as prescribed. He feels his ears are doing much better. No further pain. He has been using Flonase, astelin, xyzal for AR. He feels his nasal symptoms are improved.       Past Medical History:   Diagnosis Date    Abdominal hernia 01/2022    BPH without obstruction/lower urinary tract symptoms     Elevated PSA     Gall stones     S/P CHOLECYSTECTOMY IN 2017    Hypertension     Hypoxia     Infection of total knee replacement 06/22/2022    RIGHT, S/P SURGERGY ON 6/7/22    Obese abdomen     YANETH on CPAP     Pneumonia due to COVID-19 virus 01/12/2022    Type 2 diabetes mellitus without complication, without long-term current use of insulin      Social History     Socioeconomic History    Marital status:    Tobacco Use    Smoking status: Never    Smokeless tobacco: Never   Substance and Sexual Activity    Alcohol use: No    Drug use: No     Past Surgical History:   Procedure Laterality Date    CHOLECYSTECTOMY  06/07/2017    CYST REMOVAL      EPIDURAL STEROID INJECTION Right 3/31/2023    Procedure: Right Hip Intra-articular Steroid Injection;  Surgeon: Juanito Ann Jr., MD;  Location: [facility];  Service: Pain Management;  Laterality: Right;  @1400  No ATC  Check BG    EYE  SURGERY Bilateral     LASIX    JOINT REPLACEMENT Right 06/07/2022     Family History   Problem Relation Age of Onset    Breast cancer Mother            Review of Systems  General: negative for chills, fever or weight loss  Psychological: negative for mood changes or depression  Ophthalmic: negative for blurry vision, photophobia or eye pain  ENT: see HPI  Respiratory: no cough, shortness of breath, or wheezing  Cardiovascular: no chest pain or dyspnea on exertion  Gastrointestinal: no abdominal pain, change in bowel habits, or black/ bloody stools  Musculoskeletal: negative for gait disturbance or muscular weakness  Neurological: no syncope or seizures; no ataxia  Dermatological: negative for puritis,  rash and jaundice  Hematologic/lymphatic: no easy bruising, no new lumps or bumps      Physical Exam:    Vitals:    06/09/23 1009   BP: (!) 181/94   Pulse: 80   Temp: 98.1 °F (36.7 °C)         Constitutional: Well appearing / communicating without difficutly.  NAD.  Eyes: EOM I Bilaterally  Head/Face: Normocephalic.  Negative paranasal sinus pressure/tenderness.  Salivary glands WNL.  House Brackmann I Bilaterally.    Right Ear: Auricle normal appearance. External Auditory Canal within normal limits no lesions or masses,TM w/o masses/lesions/perforations. TM mobility noted.   Left Ear: Auricle normal appearance. External Auditory Canal within normal limits no lesions or masses,TM w/o masses/lesions/perforations. TM mobility noted.  Nose: No gross nasal septal deviation. Inferior Turbinates 3+ bilaterally. No septal perforation. No masses/lesions. External nasal skin appears normal without masses/lesions.  Oral Cavity: Gingiva/lips within normal limits.  Dentition/gingiva healthy appearing. Mucus membranes moist. Floor of mouth soft, no masses palpated. Oral Tongue mobile. Hard Palate appears normal.    Oropharynx: Base of tongue appears normal. No masses/lesions noted. Tonsillar fossa/pharyngeal wall without lesions.  Posterior oropharynx WNL.  Soft palate without masses. Midline uvula.   Neck/Lymphatic: No LAD I-VI bilaterally.  No thyromegaly.  No masses noted on exam.        Diagnostic testing reviewed:  Tympanometry me today showing type a tympanograms consistent with normal middle ear function.          Assessment:    ICD-10-CM ICD-9-CM    1. Sensorineural hearing loss, bilateral  H90.3 389.18       2. Diffuse otitis externa, unspecified chronicity, unspecified laterality  H60.319 380.10       3. Bilateral temporomandibular joint pain  M26.623 524.62       4. Chronic allergic rhinitis  J30.9 477.9           The primary encounter diagnosis was Sensorineural hearing loss, bilateral. Diagnoses of Diffuse otitis externa, unspecified chronicity, unspecified laterality, Bilateral temporomandibular joint pain, and Chronic allergic rhinitis were also pertinent to this visit.      Plan:  No orders of the defined types were placed in this encounter.      Continue to keep ears dry  Continue Flonase  Continue Astelin 1 spray per nostril BID  Continue xyzal 10mg PO daily    Follow up in  1 year with audiogram.     Hilda Hedrick MD

## 2023-06-09 NOTE — PROGRESS NOTES
Ned Brock was seen today in the clinic for follow up Impedance testing.  He reported improvement in the ear pain since last visit.    Tympanometry revealed a Type A tympanogram for both ears.  He will follow up with ENT.

## 2023-07-09 ENCOUNTER — HOSPITAL ENCOUNTER (EMERGENCY)
Facility: HOSPITAL | Age: 63
Discharge: HOME | End: 2023-07-09
Attending: EMERGENCY MEDICINE
Payer: COMMERCIAL

## 2023-07-09 VITALS
TEMPERATURE: 98 F | OXYGEN SATURATION: 95 % | RESPIRATION RATE: 18 BRPM | BODY MASS INDEX: 38.74 KG/M2 | DIASTOLIC BLOOD PRESSURE: 92 MMHG | SYSTOLIC BLOOD PRESSURE: 179 MMHG | WEIGHT: 270 LBS | HEART RATE: 81 BPM

## 2023-07-09 DIAGNOSIS — M25.561 RIGHT KNEE PAIN: ICD-10-CM

## 2023-07-09 DIAGNOSIS — M25.572 LEFT ANKLE PAIN: ICD-10-CM

## 2023-07-09 DIAGNOSIS — S80.01XA CONTUSION OF RIGHT KNEE, INITIAL ENCOUNTER: ICD-10-CM

## 2023-07-09 DIAGNOSIS — S80.812A LOWER LEG ABRASION, LEFT, INITIAL ENCOUNTER: ICD-10-CM

## 2023-07-09 DIAGNOSIS — M79.662 PAIN IN LEFT LOWER LEG: ICD-10-CM
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
## 2023-07-09 DIAGNOSIS — S80.211A KNEE ABRASION, RIGHT, INITIAL ENCOUNTER: ICD-10-CM

## 2023-07-09 DIAGNOSIS — S00.93XA TRAUMATIC HEMATOMA OF HEAD, INITIAL ENCOUNTER: ICD-10-CM

## 2023-07-09 LAB — POCT GLUCOSE: 99 MG/DL (ref 70–110)

## 2023-07-09 PROCEDURE — 12001 RPR S/N/AX/GEN/TRNK 2.5CM/<: CPT

## 2023-07-09 PROCEDURE — 82962 GLUCOSE BLOOD TEST: CPT

## 2023-07-09 PROCEDURE — 25000003 PHARM REV CODE 250: Performed by: NURSE PRACTITIONER

## 2023-07-09 PROCEDURE — 25000003 PHARM REV CODE 250

## 2023-07-09 PROCEDURE — 99285 EMERGENCY DEPT VISIT HI MDM: CPT | Mod: 25

## 2023-07-09 RX ORDER — OXYCODONE AND ACETAMINOPHEN 10; 325 MG/1; MG/1
1 TABLET ORAL
Status: COMPLETED | OUTPATIENT
Start: 2023-07-09 | End: 2023-07-09

## 2023-07-09 RX ORDER — DICLOFENAC SODIUM 10 MG/G
2 GEL TOPICAL 4 TIMES DAILY PRN
Qty: 50 G | Refills: 0 | Status: SHIPPED | OUTPATIENT
Start: 2023-07-09 | End: 2024-02-29 | Stop reason: SDUPTHER

## 2023-07-09 RX ORDER — OXYCODONE AND ACETAMINOPHEN 5; 325 MG/1; MG/1
1 TABLET ORAL EVERY 4 HOURS PRN
Qty: 12 TABLET | Refills: 0 | Status: SHIPPED | OUTPATIENT
Start: 2023-07-09 | End: 2024-02-29

## 2023-07-09 RX ORDER — LIDOCAINE HYDROCHLORIDE 10 MG/ML
10 INJECTION INFILTRATION; PERINEURAL
Status: COMPLETED | OUTPATIENT
Start: 2023-07-09 | End: 2023-07-09

## 2023-07-09 RX ORDER — NAPROXEN 500 MG/1
500 TABLET ORAL 2 TIMES DAILY PRN
Qty: 30 TABLET | Refills: 0 | Status: SHIPPED | OUTPATIENT
Start: 2023-07-09 | End: 2024-02-29 | Stop reason: SDUPTHER

## 2023-07-09 RX ORDER — SULFAMETHOXAZOLE AND TRIMETHOPRIM 800; 160 MG/1; MG/1
1 TABLET ORAL 2 TIMES DAILY
Qty: 10 TABLET | Refills: 0 | Status: SHIPPED | OUTPATIENT
Start: 2023-07-09 | End: 2023-07-14

## 2023-07-09 RX ADMIN — LIDOCAINE HYDROCHLORIDE 10 ML: 10 INJECTION, SOLUTION INFILTRATION; PERINEURAL at 02:07

## 2023-07-09 RX ADMIN — OXYCODONE AND ACETAMINOPHEN 1 TABLET: 10; 325 TABLET ORAL at 02:07

## 2023-07-09 NOTE — ED PROVIDER NOTES
Encounter Date: 7/9/2023       History     Chief Complaint   Patient presents with    Laceration     Pt reports fall with laceration to right knee.  Bleeding controlled in triage.  Pt stated he hit his head on the concrete ground but did no LOC.   No lumps or laceration to back of head.       62-year-old male with past medical history diabetes, hypertension, BPH, and right total knee replacement presents to ED for a fall.  Patient states he was working in his yard today when actually tripped over a hole in the yard.  Patient states he fell hitting his right knee, left lower leg, and posterior head on some bricks.  Patient denies loss of consciousness or blood thinner use.  Patient complaining of right knee pain that is sharp and constant, he rates it a 5/10.  Patient complaining of left lower leg pain that is throbbing and constant, rates a 7/10.  He is not taken anything make this better.  States walking makes it worse.  Patient's last tetanus was 1/20/2020.  Patient denies fever, sweats, chills, visual disturbances, shortness breath, chest pain, nausea, vomiting, dizziness, lightheadedness, headache, syncope, numbness, and tingling.    Review of patient's allergies indicates:  No Known Allergies  Past Medical History:   Diagnosis Date    Abdominal hernia 01/2022    BPH without obstruction/lower urinary tract symptoms     Elevated PSA     Gall stones     S/P CHOLECYSTECTOMY IN 2017    Hypertension     Hypoxia     Infection of total knee replacement 06/22/2022    RIGHT, S/P SURGERGY ON 6/7/22    Obese abdomen     YANETH on CPAP     Pneumonia due to COVID-19 virus 01/12/2022    Type 2 diabetes mellitus without complication, without long-term current use of insulin      Past Surgical History:   Procedure Laterality Date    CHOLECYSTECTOMY  06/07/2017    CYST REMOVAL      EPIDURAL STEROID INJECTION Right 3/31/2023    Procedure: Right Hip Intra-articular Steroid Injection;  Surgeon: Juanito Ann Jr., MD;  Location:  [facility];  Service: Pain Management;  Laterality: Right;  @1400  No ATC  Check BG    EYE SURGERY Bilateral     LASIX    JOINT REPLACEMENT Right 06/07/2022     Family History   Problem Relation Age of Onset    Breast cancer Mother      Social History     Tobacco Use    Smoking status: Never    Smokeless tobacco: Never   Substance Use Topics    Alcohol use: No    Drug use: No     Review of Systems   Constitutional:  Negative for chills, diaphoresis and fever.   HENT:          (+) head injury   Eyes:  Negative for visual disturbance.   Respiratory:  Negative for shortness of breath.    Cardiovascular:  Negative for chest pain.   Gastrointestinal:  Negative for nausea and vomiting.   Genitourinary:  Negative for decreased urine volume and difficulty urinating.   Musculoskeletal:  Positive for arthralgias (right knee and left lower leg). Negative for myalgias and neck pain.   Skin:  Positive for wound (left lower leg abrasions and right knee laceration).   Neurological:  Negative for dizziness, syncope, weakness, light-headedness, numbness and headaches.   Psychiatric/Behavioral:  Negative for confusion.      Physical Exam     Initial Vitals [07/09/23 1411]   BP Pulse Resp Temp SpO2   (!) 145/81 81 18 98.3 °F (36.8 °C) (!) 93 %      MAP       --         Physical Exam    Nursing note and vitals reviewed.  Constitutional: He appears well-developed and well-nourished. He is not diaphoretic. He is active. He does not appear ill. No distress.   HENT:   Head: Normocephalic. Head is with contusion (2 cm left posterior hematoma).       Right Ear: External ear normal.   Left Ear: External ear normal.   Nose: Nose normal.   Eyes: Conjunctivae, EOM and lids are normal. Pupils are equal, round, and reactive to light. Right eye exhibits no discharge. Left eye exhibits no discharge. No scleral icterus. Right eye exhibits normal extraocular motion and no nystagmus. Left eye exhibits normal extraocular motion and no nystagmus.   Neck:  Phonation normal. Neck supple.   Normal range of motion.   Full passive range of motion without pain.     Cardiovascular:  Normal rate and regular rhythm.           Pulses:       Dorsalis pedis pulses are 2+ on the left side.   Pulmonary/Chest: Effort normal and breath sounds normal. No respiratory distress.   Abdominal: He exhibits no distension.   Musculoskeletal:         General: Normal range of motion.      Cervical back: Full passive range of motion without pain, normal range of motion and neck supple.      Right knee: Laceration (3 cm anterior patella abrasion; see pic below) and bony tenderness (patella) present. No swelling, deformity, effusion, erythema or ecchymosis. Normal range of motion.      Left knee: Normal.      Right lower leg: Normal.      Left lower leg: Bony tenderness (distal lateral) present. No swelling, deformity, lacerations or tenderness. No edema.      Left ankle: No swelling or deformity. Tenderness present over the lateral malleolus. Normal range of motion.      Left foot: Normal.      Comments: Previous right knee surgical scar.  Left distal lateral leg skin abrasions; see pic below     Neurological: He is alert and oriented to person, place, and time. He has normal strength. No cranial nerve deficit or sensory deficit. GCS eye subscore is 4. GCS verbal subscore is 5. GCS motor subscore is 6.   Skin: Skin is dry. Capillary refill takes less than 2 seconds.             ED Course   Lac Repair    Date/Time: 7/9/2023 3:21 PM  Performed by: Alayna Holdsworth, PA-C  Authorized by: Alayna Holdsworth, PA-C     Consent:     Consent obtained:  Verbal    Consent given by:  Patient    Risks discussed:  Infection, pain, poor wound healing and poor cosmetic result  Universal protocol:     Patient identity confirmed:  Verbally with patient  Anesthesia:     Anesthesia method:  None  Laceration details:     Location:  Leg    Leg location:  R knee    Length (cm):  2  Exploration:     Hemostasis achieved  with:  Direct pressure    Imaging obtained: x-ray      Imaging outcome: foreign body not noted    Treatment:     Area cleansed with:  Saline    Amount of cleaning:  Extensive    Irrigation solution:  Sterile saline    Irrigation method:  Pressure wash    Debridement:  None    Undermining:  None  Skin repair:     Repair method:  Tissue adhesive  Approximation:     Approximation:  Close  Repair type:     Repair type:  Simple  Post-procedure details:     Dressing:  Non-adherent dressing    Procedure completion:  Tolerated well, no immediate complications  Labs Reviewed   POCT GLUCOSE          Imaging Results              X-Ray Knee 3 View Right (Final result)  Result time 07/09/23 15:19:49      Final result by Celso Dimas MD (07/09/23 15:19:49)                   Impression:      1. No acute displaced fracture or dislocation of the right knee.      Electronically signed by: Celso Dimas MD  Date:    07/09/2023  Time:    15:19               Narrative:    EXAMINATION:  XR KNEE 3 VIEW RIGHT    CLINICAL HISTORY:  Pain in right knee    TECHNIQUE:  AP, lateral, and Merchant views of the right knee were performed.    COMPARISON:  None    FINDINGS:  Three views right knee.    Right knee arthroplasty is in place, femoral component and tibial component are well positioned and well aligned.  No acute displaced fracture or dislocation of the knee.  No radiopaque foreign body.  No large knee joint effusion.                                       X-Ray Tibia Fibula 2 View Left (Final result)  Result time 07/09/23 15:21:13      Final result by Celso Dimas MD (07/09/23 15:21:13)                   Impression:      1. No acute displaced fracture or dislocation of the tibia or fibula.      Electronically signed by: Celso Dimas MD  Date:    07/09/2023  Time:    15:21               Narrative:    EXAMINATION:  XR TIBIA FIBULA 2 VIEW LEFT    CLINICAL HISTORY:  Pain in left lower leg    TECHNIQUE:  AP and lateral views of  the left tibia and fibula were performed.    COMPARISON:  None.    FINDINGS:  Four views left tibia fibula.    No acute displaced fracture or dislocation of the tibia or fibula.  No radiopaque foreign body.  The ankle mortise is intact.                                       X-Ray Ankle Complete Left (Final result)  Result time 07/09/23 15:21:44      Final result by Celso Dimas MD (07/09/23 15:21:44)                   Impression:      1. No acute displaced fracture or dislocation of the ankle.      Electronically signed by: Celso Dimas MD  Date:    07/09/2023  Time:    15:21               Narrative:    EXAMINATION:  XR ANKLE COMPLETE 3 VIEW LEFT    CLINICAL HISTORY:  Pain in left ankle and joints of left foot    TECHNIQUE:  AP, lateral and oblique views of the left ankle were performed.    COMPARISON:  None    FINDINGS:  Three views left ankle.    No acute displaced fracture or dislocation of the ankle.  No radiopaque foreign body.  The ankle mortise is intact.  No significant edema.                                       CT Head Without Contrast (Final result)  Result time 07/09/23 14:54:16      Final result by Kate Pete MD (07/09/23 14:54:16)                   Impression:      No acute abnormality.      Electronically signed by: Kate Pete MD  Date:    07/09/2023  Time:    14:54               Narrative:    EXAMINATION:  CT HEAD WITHOUT CONTRAST    CLINICAL HISTORY:  Head trauma, skull fracture or hematoma (Age 19-64y);    TECHNIQUE:  Low dose axial CT images obtained throughout the head without intravenous contrast. Sagittal and coronal reconstructions were performed.    COMPARISON:  None.    FINDINGS:  Intracranial compartment:    Ventricles and sulci are normal in size for age without evidence of hydrocephalus. No extra-axial blood or fluid collections.    The brain parenchyma appears normal. No parenchymal mass, hemorrhage, edema or major vascular distribution infarct.    Skull/extracranial  contents (limited evaluation): No fracture. Mastoid air cells are clear.Mucous retention cyst in the left maxillary antra.  The remaining paranasal sinuses are essentially clear.                                       CT Cervical Spine Without Contrast (Final result)  Result time 07/09/23 15:05:00      Final result by Kate Pete MD (07/09/23 15:05:00)                   Impression:      Multilevel degenerative changes of the cervical spine without fracture or subluxation.      Electronically signed by: Kate Pete MD  Date:    07/09/2023  Time:    15:05               Narrative:    EXAMINATION:  CT CERVICAL SPINE WITHOUT CONTRAST    CLINICAL HISTORY:  Neck trauma, dangerous injury mechanism (Age 16-64y);    TECHNIQUE:  Low dose axial images, sagittal and coronal reformations were performed though the cervical spine.  Contrast was not administered.    COMPARISON:  None    FINDINGS:  The incidentally visualized soft tissue structures of the neck show calcifications of the extracranial carotid vasculature.  The lung apices are clear.  The prevertebral soft tissues appear normal.  There is straightening of the normal cervical lordosis.  Vertebral body heights are maintained.  There are multilevel degenerative changes consisting of disc space narrowing, endplate sclerosis, marginal osteophytosis and facet arthropathy.  No fracture or subluxation of the cervical spine is detected.                                       Medications   LIDOcaine HCL 10 mg/ml (1%) injection 10 mL (10 mLs Infiltration Given by Other 7/9/23 6327)   oxyCODONE-acetaminophen  mg per tablet 1 tablet (1 tablet Oral Given 7/9/23 6546)     Medical Decision Making:   Initial Assessment:   62-year-old male with past medical history diabetes, hypertension, BPH, and right total knee replacement presents to ED for a fall.  Patient's chart and medical history reviewed.  Differential Diagnosis:    Fracture  Dislocation  Contusion  Sprain  SAH  Epidural hematoma  Subdural hematoma  Laceration  Abrasion  Avulsion   Clinical Tests:   Lab Tests: Ordered and Reviewed  Radiological Study: Reviewed and Ordered  ED Management:  Patient's vitals reviewed.  He is afebrile, no respiratory distress, nontoxic-appearing in the ED. patient's neuro exam was normal.  Patient had a left 2 cm posterior skull hematoma.  Patient had a left lateral lower leg skin abrasions with tenderness palpation of the left distal fibula and lateral malleolus.  2+ DP pulses.  Normal sensation.  Patient also had a right knee anterior patella 3 cm abrasion.  No active bleeding.  Patient given Percocet for pain.  Point of care glucose is 99.  With shared decision-making we will get CT and x-rays today.  Wounds were cleaned and irrigated with normal saline.  Knee abrasion repaired with dermabond. Nonadherent bandage applied. Patient tolerated well.  Instructed patient to keep the area clean and dry and watch out for signs of infection including erythema, warmth, pus discharge, and fevers at home.  Due to patient's history of an infection in his right knee total knee replacement we will prophylactically treat with antibiotics.  Patient will be sent home with Bactrim.  CT head showed no acute abnormality and C-spine multilevel degenerative changes of the cervical spine without fracture or subluxation. . Knee x-ray was unremarkable per my personal interpretation. Official x-ray interpretation showed No acute displaced fracture or dislocation of the right knee. Tib/fib x-ray was unremarkable per my personal interpretation. Official x-ray interpretation showed No acute displaced fracture or dislocation of the tibia or fibula. Ankle x-ray was unremarkable per my personal interpretation. Official x-ray interpretation showed no acute displaced fracture or dislocation of the ankle.  Discussed with patient this is most likely bone contusions from his fall  which will take time to heal.  Instructed patient to rest, ice, and elevate.  Patient will be sent home with naproxen, short course of Percocet, and Voltaren gel for symptomatic control. I have reviewed the  for this patient. Patient agrees with this plan. Discussed with him strict return precautions, he verbalized understanding. Patient is stable for discharge.                             Clinical Impression:   Final diagnoses:  [M25.561] Right knee pain  [M79.662] Pain in left lower leg  [M25.572] Left ankle pain  [S80.211A] Knee abrasion, right, initial encounter  [S80.812A] Lower leg abrasion, left, initial encounter  [S80.01XA] Contusion of right knee, initial encounter  [S00.93XA] Traumatic hematoma of head, initial encounter  [W19.XXXA] Fall, initial encounter (Primary)        ED Disposition Condition    Discharge Stable          ED Prescriptions       Medication Sig Dispense Start Date End Date Auth. Provider    oxyCODONE-acetaminophen (PERCOCET) 5-325 mg per tablet Take 1 tablet by mouth every 4 (four) hours as needed for Pain. 12 tablet 7/9/2023 -- Alayna Holdsworth, PA-C    naproxen (NAPROSYN) 500 MG tablet Take 1 tablet (500 mg total) by mouth 2 (two) times daily as needed (Pain). 30 tablet 7/9/2023 -- Alayna Holdsworth, PA-C    diclofenac sodium (VOLTAREN) 1 % Gel Apply 2 g topically 4 (four) times daily as needed (Pain). 50 g 7/9/2023 -- Alayna Holdsworth, PA-C    sulfamethoxazole-trimethoprim 800-160mg (BACTRIM DS) 800-160 mg Tab Take 1 tablet by mouth 2 (two) times daily. for 5 days 10 tablet 7/9/2023 7/14/2023 Alayna Holdsworth, PA-C          Follow-up Information       Follow up With Specialties Details Why Contact Info    Donaldo Willis MD Internal Medicine   [address] [place] [id number]  [phone]               Alayna Holdsworth, PA-C  07/09/23 0061

## 2023-07-09 NOTE — DISCHARGE INSTRUCTIONS

## 2023-07-17 DIAGNOSIS — E11.69 TYPE 2 DIABETES MELLITUS WITH OTHER SPECIFIED COMPLICATION, WITHOUT LONG-TERM CURRENT USE OF INSULIN: ICD-10-CM

## 2023-07-17 NOTE — TELEPHONE ENCOUNTER
Care Due:                  Date            Visit Type   Department     Provider  --------------------------------------------------------------------------------                                EP -                              PRIMARY      [facility]  Last Visit: 02-      CARE ([facility])   MEDICINE       JUAN RADFORD  Next Visit: None Scheduled  None         None Found                                                            Last  Test          Frequency    Reason                     Performed    Due Date  --------------------------------------------------------------------------------    HBA1C.......  6 months...  dulaglutide, metFORMIN...  02-   08-    [facility] Embedded Care Due Messages. Reference number: [id number].   7/17/2023 11:15:12 AM CDT

## 2023-07-18 RX ORDER — METFORMIN HYDROCHLORIDE 500 MG/1
TABLET, EXTENDED RELEASE ORAL
Qty: 180 TABLET | Refills: 0 | Status: SHIPPED | OUTPATIENT
Start: 2023-07-18 | End: 2023-10-12

## 2023-07-18 NOTE — TELEPHONE ENCOUNTER
Refill Routing Note   Medication(s) are not appropriate for processing by [facility] for the following reason(s):      Patient seen in ED/Hospital since LOV with provider    [facility] action(s):  Defer Care Due:  Labs due     Medication Therapy Plan: Prescribed medication at ED visit interacts with Metformin    Extended chart review required: Yes     Appointments  past 12m or future 3m with PCP    Date Provider   Last Visit   2/14/2023 Donaldo Willis MD   Next Visit   Visit date not found Donaldo Willis MD   ED visits in past 90 days: 1        Note composed:3:27 AM 07/18/2023

## 2023-10-12 DIAGNOSIS — E11.69 TYPE 2 DIABETES MELLITUS WITH OTHER SPECIFIED COMPLICATION, WITHOUT LONG-TERM CURRENT USE OF INSULIN: ICD-10-CM

## 2023-10-12 RX ORDER — METFORMIN HYDROCHLORIDE 500 MG/1
TABLET, EXTENDED RELEASE ORAL
Qty: 180 TABLET | Refills: 0 | Status: SHIPPED | OUTPATIENT
Start: 2023-10-12 | End: 2024-01-02 | Stop reason: SDUPTHER

## 2023-10-12 NOTE — TELEPHONE ENCOUNTER
Refill Routing Note   Medication(s) are not appropriate for processing by [facility] for the following reason(s):      Patient seen in ED/Hospital since LOV with provider  Required labs outdated    [facility] action(s):  Defer Care Due:  Labs due            Appointments  past 12m or future 3m with PCP    Date Provider   Last Visit   2/14/2023 Donaldo Willis MD   Next Visit   Visit date not found Donaldo Willis MD   ED visits in past 90 days: 0        Note composed:12:13 PM 10/12/2023

## 2023-10-12 NOTE — TELEPHONE ENCOUNTER
Care Due:                  Date            Visit Type   Department     Provider  --------------------------------------------------------------------------------                                EP -                              PRIMARY      [facility]  Last Visit: 02-      CARE ([facility])   MEDICINE       JUAN RADFORD  Next Visit: None Scheduled  None         None Found                                                            Last  Test          Frequency    Reason                     Performed    Due Date  --------------------------------------------------------------------------------    CBC.........  12 months..  diclofenac...............  06- 06-    HBA1C.......  6 months...  dulaglutide, metFORMIN...  02-   08-    [employer] Embedded Care Due Messages. Reference number: [id number].   10/12/2023 8:43:51 AM CDT

## 2023-11-12 ENCOUNTER — PATIENT MESSAGE (OUTPATIENT)
Dept: ADMINISTRATIVE | Facility: HOSPITAL | Age: 63
End: 2023-11-12
Payer: COMMERCIAL

## 2023-11-26 DIAGNOSIS — I10 ESSENTIAL HYPERTENSION: ICD-10-CM

## 2023-11-26 NOTE — TELEPHONE ENCOUNTER
Care Due:                  Date            Visit Type   Department     Provider  --------------------------------------------------------------------------------                                EP -                              PRIMARY      [facility] INTER[facility]  Last Visit: 02-      CARE ([facility])   MEDICINE       JUAN RADFORD  Next Visit: None Scheduled  None         None Found                                                            Last  Test          Frequency    Reason                     Performed    Due Date  --------------------------------------------------------------------------------    Office Visit  12 months..  diclofenac...............  02- 02-    CMP.........  12 months..  amlodipine-benazepril,     02-   02-                             diclofenac, metFORMIN....    [employer] Embedded Care Due Messages. Reference number: [id number].   11/26/2023 5:52:01 PM CST

## 2023-11-27 RX ORDER — AMLODIPINE AND BENAZEPRIL HYDROCHLORIDE 5; 20 MG/1; MG/1
1 CAPSULE ORAL DAILY
Qty: 90 CAPSULE | Refills: 0 | Status: SHIPPED | OUTPATIENT
Start: 2023-11-27 | End: 2024-01-02 | Stop reason: SDUPTHER

## 2023-11-27 NOTE — TELEPHONE ENCOUNTER
Refill Routing Note   Medication(s) are not appropriate for processing by [facility] for the following reason(s):        Required vitals abnormal    [facility] action(s):  Defer   Requires appointment : Yes     Requires labs : Yes             Appointments  past 12m or future 3m with PCP    Date Provider   Last Visit   2/14/2023 Donaldo Willis MD   Next Visit   Visit date not found Donaldo Willis MD   ED visits in past 90 days: 0        Note composed:2:12 PM 11/27/2023

## 2023-12-28 ENCOUNTER — TELEPHONE (OUTPATIENT)
Dept: INTERNAL MEDICINE | Facility: CLINIC | Age: 63
End: 2023-12-28
Payer: COMMERCIAL

## 2023-12-28 NOTE — TELEPHONE ENCOUNTER
----- Message from Lizz Akins sent at 12/27/2023  4:24 PM CST -----  Contact: [phone]  [email or web address]     Patient is calling for Medical Advice regarding: cold symptoms     How long has patient had these symptoms: over a week    Pharmacy name and phone#:   [id number] - [place] - [address]  [address]  [place] [address]  Phone: [phone] Fax: [phone]      Would like response via [employer]:  call  or msg     Comments:    Pt sent portal msg requesting an appointment for a swab for sore throat. Nothing available with the provider or team. Making next available appointment for pt with available appointment and wanted to make provider aware incase provider would rather pt be seen by he or PA on team. Please Advise

## 2024-01-02 ENCOUNTER — OFFICE VISIT (OUTPATIENT)
Dept: INTERNAL MEDICINE | Facility: CLINIC | Age: 64
End: 2024-01-02
Payer: COMMERCIAL

## 2024-01-02 VITALS
WEIGHT: 287.5 LBS | HEIGHT: 70 IN | SYSTOLIC BLOOD PRESSURE: 164 MMHG | OXYGEN SATURATION: 98 % | DIASTOLIC BLOOD PRESSURE: 80 MMHG | HEART RATE: 82 BPM | BODY MASS INDEX: 41.16 KG/M2 | TEMPERATURE: 98 F

## 2024-01-02 DIAGNOSIS — I10 ESSENTIAL HYPERTENSION: ICD-10-CM

## 2024-01-02 DIAGNOSIS — E11.69 TYPE 2 DIABETES MELLITUS WITH OTHER SPECIFIED COMPLICATION, WITHOUT LONG-TERM CURRENT USE OF INSULIN: ICD-10-CM

## 2024-01-02 DIAGNOSIS — F32.4 MAJOR DEPRESSIVE DISORDER WITH SINGLE EPISODE, IN PARTIAL REMISSION: ICD-10-CM

## 2024-01-02 DIAGNOSIS — J31.0 RHINITIS, UNSPECIFIED TYPE: Primary | ICD-10-CM

## 2024-01-02 PROCEDURE — 1159F MED LIST DOCD IN RCRD: CPT | Mod: CPTII,S$GLB,, | Performed by: PHYSICIAN ASSISTANT

## 2024-01-02 PROCEDURE — 3079F DIAST BP 80-89 MM HG: CPT | Mod: CPTII,S$GLB,, | Performed by: PHYSICIAN ASSISTANT

## 2024-01-02 PROCEDURE — 99999 PR PBB SHADOW E&M-EST. PATIENT-LVL IV: CPT | Mod: PBBFAC,,, | Performed by: PHYSICIAN ASSISTANT

## 2024-01-02 PROCEDURE — 3008F BODY MASS INDEX DOCD: CPT | Mod: CPTII,S$GLB,, | Performed by: PHYSICIAN ASSISTANT

## 2024-01-02 PROCEDURE — 3077F SYST BP >= 140 MM HG: CPT | Mod: CPTII,S$GLB,, | Performed by: PHYSICIAN ASSISTANT

## 2024-01-02 PROCEDURE — 99214 OFFICE O/P EST MOD 30 MIN: CPT | Mod: S$GLB,,, | Performed by: PHYSICIAN ASSISTANT

## 2024-01-02 RX ORDER — METFORMIN HYDROCHLORIDE 500 MG/1
500 TABLET, EXTENDED RELEASE ORAL 2 TIMES DAILY WITH MEALS
Qty: 60 TABLET | Refills: 0 | Status: SHIPPED | OUTPATIENT
Start: 2024-01-02 | End: 2025-01-01

## 2024-01-02 RX ORDER — CETIRIZINE HYDROCHLORIDE 10 MG/1
10 TABLET ORAL DAILY
Qty: 30 TABLET | Refills: 0 | Status: SHIPPED | OUTPATIENT
Start: 2024-01-02 | End: 2025-01-01

## 2024-01-02 RX ORDER — AMLODIPINE AND BENAZEPRIL HYDROCHLORIDE 5; 20 MG/1; MG/1
1 CAPSULE ORAL DAILY
Qty: 30 CAPSULE | Refills: 0 | Status: SHIPPED | OUTPATIENT
Start: 2024-01-02 | End: 2025-01-01

## 2024-01-02 RX ORDER — CITALOPRAM 40 MG/1
40 TABLET, FILM COATED ORAL DAILY
Qty: 30 TABLET | Refills: 0 | Status: SHIPPED | OUTPATIENT
Start: 2024-01-02 | End: 2025-01-01

## 2024-01-02 RX ORDER — FLUTICASONE PROPIONATE 50 MCG
1 SPRAY, SUSPENSION (ML) NASAL DAILY
Qty: 11.1 ML | Refills: 0 | Status: SHIPPED | OUTPATIENT
Start: 2024-01-02

## 2024-01-02 NOTE — PROGRESS NOTES
INTERNAL MEDICINE URGENT VISIT NOTE    CHIEF COMPLAINT     Chief Complaint   Patient presents with    Sore Throat    Nasal Congestion    Cough    Generalized Body Aches    Fatigue       HPI     Ned Brock is a 63 y.o. male who presents for an urgent visit today.    PCP is Donaldo Willis MD, patient is new to me.     Patient presents with complaints of sore throat, nasal congestion, cough, body aches. Symptoms have been present for the past 30 days. He reports that he originally had fever.   Fever, vomiting, diarrhea, weak - did not seek care at that time. Symptoms have been improving but he has a new grand baby (two weeks old) that he wants to visit. He admits he wanted to get checked out before going to meet his granddaughter for the first time.     Coughing at baseline - somewhat worse with recent illness but improving, no fever.    Nasal congestion persistent, worse in the mornings.   Sore throat - using cough drops and reports some blood tinged mucous initially but this is now resolved. Throat is just scratchy in the mornings.     Has long COVID - originally diagnosed with COVID one year ago, reports severe symptoms with intubation.      Has not been keeping tract of BP at home. No CP or SOB.       Past Medical History:  Past Medical History:   Diagnosis Date    Abdominal hernia 01/2022    BPH without obstruction/lower urinary tract symptoms     Elevated PSA     Gall stones     S/P CHOLECYSTECTOMY IN 2017    Hypertension     Hypoxia     Infection of total knee replacement 06/22/2022    RIGHT, S/P SURGERGY ON 6/7/22    Obese abdomen     YANETH on CPAP     Pneumonia due to COVID-19 virus 01/12/2022    Type 2 diabetes mellitus without complication, without long-term current use of insulin        Home Medications:  Prior to Admission medications    Medication Sig Start Date End Date Taking? Authorizing Provider   albuterol (ACCUNEB) 0.63 mg/3 mL Nebu Take 11.9 mLs (2.499 mg total) by nebulization every 6 (six)  hours as needed (wheezing/shortness of breath). Rescue 2/14/23  Yes Donaldo Willis MD   albuterol (VENTOLIN HFA) 90 mcg/actuation inhaler Inhale 2 puffs into the lungs every 6 (six) hours as needed for Wheezing. Rescue 2/14/23 2/14/24 Yes Donaldo Willis MD   amlodipine-benazepril 5-20 mg (LOTREL) 5-20 mg per capsule Take 1 capsule by mouth once daily 11/27/23 11/26/24 Yes Donaldo Willis MD   ascorbic acid (VITAMIN C ORAL) Take 1 capsule by mouth once daily.   Yes Provider, Historical   citalopram (CELEXA) 40 MG tablet Take 1 tablet (40 mg total) by mouth once daily. 12/9/22 1/2/24 Yes Donaldo Willis MD   ergocalciferol, vitamin D2, (VITAMIN D ORAL) Take 1 capsule by mouth once daily.   Yes Provider, Historical   flash glucose sensor (FREESTYLE JANETH 14 DAY SENSOR) Kit IU 10/21/22  Yes Donaldo Willis MD   metFORMIN (GLUCOPHAGE-XR) 500 MG ER 24hr tablet TAKE 1 TABLET BY MOUTH TWICE DAILY WITH MEALS 10/12/23  Yes Donaldo Willis MD   naproxen (NAPROSYN) 500 MG tablet Take 1 tablet (500 mg total) by mouth 2 (two) times daily as needed (Pain). 7/9/23  Yes Holdsworth, Alayna, PA-C   pulse oximeter (PULSE OXIMETER) device Use twice daily at 8 AM and 3 PM and [facility] as [facility]. 1/16/22  Yes Jamilah Canales MD   ZINC ORAL Take 1 tablet by mouth once daily.   Yes Provider, Historical   amoxicillin (AMOXIL) 875 MG tablet Take 875 mg by mouth every 12 (twelve) hours.    Provider, Historical   clotrimazole (LOTRIMIN) 1 % Soln Apply topically 2 (two) times daily.  Patient not taking: Reported on 1/2/2024 5/5/23   Hilda Boykin MD   diclofenac sodium (VOLTAREN) 1 % Gel Apply 2 g topically 4 (four) times daily.  Patient not taking: Reported on 1/2/2024 10/21/22   Donaldo Willis MD   diclofenac sodium (VOLTAREN) 1 % Gel Apply 2 g topically 4 (four) times daily as needed (Pain).  Patient not taking: Reported on 1/2/2024 7/9/23   Holdsworth, Alayna, PA-C   fluticasone propionate  (FLONASE) 50 mcg/actuation nasal spray 1 spray by Each Nostril route daily as needed for Rhinitis.    Provider, Historical   guaiFENesin (MUCINEX) 600 mg 12 hr tablet Take 1,200 mg by mouth 2 (two) times daily as needed for Congestion.    Provider, Historical   methylPREDNISolone (MEDROL DOSEPACK) 4 mg tablet use as directed  Patient not taking: Reported on 6/9/2023 5/5/23   Hilda Boykin MD   oxyCODONE-acetaminophen (PERCOCET) 5-325 mg per tablet Take 1 tablet by mouth every 4 (four) hours as needed for Pain.  Patient not taking: Reported on 1/2/2024 7/9/23   Holdsworth, Alayna, PA-C   traMADoL (ULTRAM) 50 mg tablet Take 50 mg by mouth every 6 (six) hours.    Provider, Historical       Review of Systems:  Review of Systems   Constitutional:  Negative for chills and fever.   HENT:  Negative for sore throat and trouble swallowing.    Eyes:  Negative for visual disturbance.   Respiratory:  Negative for cough and shortness of breath.    Cardiovascular:  Negative for chest pain.   Gastrointestinal:  Negative for abdominal pain, constipation, diarrhea, nausea and vomiting.   Genitourinary:  Negative for dysuria and flank pain.   Musculoskeletal:  Negative for back pain, neck pain and neck stiffness.   Skin:  Negative for rash.   Neurological:  Negative for dizziness, syncope, weakness and headaches.   Psychiatric/Behavioral:  Negative for confusion.        Health Maintainence:   Immunizations:  Health Maintenance         Date Due Completion Date    HIV Screening Never done ---    RSV Vaccine (Age 60+ and Pregnant patients) (1 - 1-dose 60+ series) Never done ---    Eye Exam 02/01/2023 2/1/2022 (Done)    Override on 2/1/2022: Done    Diabetes Urine Screening 02/15/2023 2/15/2022    Foot Exam 02/15/2023 2/15/2022 (Done)    Override on 2/15/2022: Done    Colorectal Cancer Screening 04/07/2023 4/7/2022    Lipid Panel 04/19/2023 4/19/2022    Hemoglobin A1c 08/14/2023 2/14/2023    Influenza Vaccine (1) 09/01/2023  "10/21/2022    COVID-19 Vaccine (6 - 2023-24 season) 09/01/2023 7/12/2022    TETANUS VACCINE 01/20/2030 1/20/2020             PHYSICAL EXAM     BP (!) 164/80 (BP Location: Left arm, Patient Position: Sitting, BP Method: Medium (Manual))   Pulse 82   Temp 98.2 °F (36.8 °C)   Ht 5' 10" (1.778 m)   Wt 130.4 kg (287 lb 7.7 oz)   SpO2 98%   BMI 41.25 kg/m²     Physical Exam  Vitals and nursing note reviewed.   Constitutional:       Appearance: Normal appearance.      Comments: Elderly male in NAD or appareint pain. He ambualtes with a cane at abseline.   He speaks in cleaf full sentncsa.    HENT:      Head: Normocephalic and atraumatic.      Nose: Nose normal.      Mouth/Throat:      Pharynx: Oropharynx is clear.   Eyes:      Conjunctiva/sclera: Conjunctivae normal.   Cardiovascular:      Rate and Rhythm: Normal rate and regular rhythm.      Pulses: Normal pulses.   Pulmonary:      Effort: No respiratory distress.   Abdominal:      Tenderness: There is no abdominal tenderness.   Musculoskeletal:         General: Normal range of motion.      Cervical back: No rigidity.   Skin:     General: Skin is warm and dry.      Capillary Refill: Capillary refill takes less than 2 seconds.      Findings: No rash.   Neurological:      General: No focal deficit present.      Mental Status: He is alert.      Gait: Gait normal.   Psychiatric:         Mood and Affect: Mood normal.         LABS     Lab Results   Component Value Date    HGBA1C 6.5 (H) 02/14/2023     CMP  Sodium   Date Value Ref Range Status   02/14/2023 138 136 - 145 mmol/L Final     Potassium   Date Value Ref Range Status   02/14/2023 4.2 3.5 - 5.1 mmol/L Final     Chloride   Date Value Ref Range Status   02/14/2023 99 95 - 110 mmol/L Final     CO2   Date Value Ref Range Status   02/14/2023 26 23 - 29 mmol/L Final     Glucose   Date Value Ref Range Status   02/14/2023 82 70 - 110 mg/dL Final     BUN   Date Value Ref Range Status   02/14/2023 21 (H) 6 - 20 mg/dL Final "     Creatinine   Date Value Ref Range Status   02/14/2023 0.9 0.5 - 1.4 mg/dL Final     Calcium   Date Value Ref Range Status   02/14/2023 9.8 8.7 - 10.5 mg/dL Final     Total Protein   Date Value Ref Range Status   02/14/2023 8.0 6.0 - 8.4 g/dL Final     Albumin   Date Value Ref Range Status   02/14/2023 4.4 3.5 - 5.2 g/dL Final     Total Bilirubin   Date Value Ref Range Status   02/14/2023 0.3 0.1 - 1.0 mg/dL Final     Comment:     For infants and newborns, interpretation of results should be based  on gestational age, weight and in agreement with clinical  observations.    Premature Infant recommended reference ranges:  Up to 24 hours.............<8.0 mg/dL  Up to 48 hours............<12.0 mg/dL  3-5 days..................<15.0 mg/dL  6-29 days.................<15.0 mg/dL       Alkaline Phosphatase   Date Value Ref Range Status   02/14/2023 79 55 - 135 U/L Final     AST   Date Value Ref Range Status   02/14/2023 35 10 - 40 U/L Final     ALT   Date Value Ref Range Status   02/14/2023 39 10 - 44 U/L Final     Anion Gap   Date Value Ref Range Status   02/14/2023 13 8 - 16 mmol/L Final     eGFR if    Date Value Ref Range Status   06/22/2022 >60 >60 mL/min/1.73 m^2 Final     eGFR if non    Date Value Ref Range Status   06/22/2022 >60 >60 mL/min/1.73 m^2 Final     Comment:     Calculation used to obtain the estimated glomerular filtration  rate (eGFR) is the CKD-EPI equation.        Lab Results   Component Value Date    WBC 9.46 06/24/2022    HGB 12.5 (L) 06/24/2022    HCT 39.6 (L) 06/24/2022    MCV 86 06/24/2022     (H) 06/24/2022     Lab Results   Component Value Date    CHOL 213 (H) 04/19/2022     Lab Results   Component Value Date    HDL 30 (L) 04/19/2022     Lab Results   Component Value Date    LDLCALC 123.8 04/19/2022     Lab Results   Component Value Date    TRIG 296 (H) 04/19/2022     Lab Results   Component Value Date    CHOLHDL 14.1 (L) 04/19/2022     No results found  "for: "TSH", "C3NVOSB", "E3VTNYA", "THYROIDAB"    ASSESSMENT/PLAN     eNd Brock is a 63 y.o. male     Ned was seen today for sore throat, nasal congestion, cough, generalized body aches and fatigue. He reports that he is overall feeling better. No need for flu, COVID or strep testing. Lungs are clear and there is only mild HEENT inflammation. Will treat symptomatically. Meds are refilled for one month - pt should follow-up with PCP for further med refills and annual exam. He is aware of ED prompts. Discussed at length ways to keep his granddaughter safe - wear mask until she is 6 weeks or older, use good hand hygiene, monitor symptoms.     Diagnoses and all orders for this visit:    Rhinitis, unspecified type  -     fluticasone propionate (FLONASE) 50 mcg/actuation nasal spray; 1 spray (50 mcg total) by Each Nostril route once daily.  -     cetirizine (ZYRTEC) 10 MG tablet; Take 1 tablet (10 mg total) by mouth once daily.    Essential hypertension  -     amlodipine-benazepril 5-20 mg (LOTREL) 5-20 mg per capsule; Take 1 capsule by mouth once daily.    Major depressive disorder with single episode, in partial remission  -     citalopram (CELEXA) 40 MG tablet; Take 1 tablet (40 mg total) by mouth once daily.    Type 2 diabetes mellitus with other specified complication, without long-term current use of insulin  -     metFORMIN (GLUCOPHAGE-XR) 500 MG ER 24hr tablet; Take 1 tablet (500 mg total) by mouth 2 (two) times daily with meals.      Patient was counseled on when and how to seek emergent care.       Jamilah Hutchinson PA-C   Department of Internal Medicine - [facility]   10:42 AM     "

## 2024-01-02 NOTE — PATIENT INSTRUCTIONS
Please make an appointment with Dr. Willis in one month for an annual exam.    Please see me in clinic sooner if your symptoms do not improve as expected.

## 2024-01-03 DIAGNOSIS — E11.9 TYPE 2 DIABETES MELLITUS WITHOUT COMPLICATION: ICD-10-CM

## 2024-01-08 ENCOUNTER — PATIENT MESSAGE (OUTPATIENT)
Dept: ADMINISTRATIVE | Facility: HOSPITAL | Age: 64
End: 2024-01-08
Payer: COMMERCIAL

## 2024-02-11 ENCOUNTER — PATIENT MESSAGE (OUTPATIENT)
Dept: ADMINISTRATIVE | Facility: HOSPITAL | Age: 64
End: 2024-02-11
Payer: COMMERCIAL

## 2024-02-19 DIAGNOSIS — E11.69 TYPE 2 DIABETES MELLITUS WITH OTHER SPECIFIED COMPLICATION, WITHOUT LONG-TERM CURRENT USE OF INSULIN: ICD-10-CM

## 2024-02-29 ENCOUNTER — OFFICE VISIT (OUTPATIENT)
Dept: INTERNAL MEDICINE | Facility: CLINIC | Age: 64
End: 2024-02-29
Payer: COMMERCIAL

## 2024-02-29 ENCOUNTER — LAB VISIT (OUTPATIENT)
Dept: LAB | Facility: HOSPITAL | Age: 64
End: 2024-02-29
Payer: COMMERCIAL

## 2024-02-29 VITALS
BODY MASS INDEX: 42.45 KG/M2 | SYSTOLIC BLOOD PRESSURE: 174 MMHG | DIASTOLIC BLOOD PRESSURE: 82 MMHG | OXYGEN SATURATION: 95 % | HEART RATE: 90 BPM | HEIGHT: 70 IN | WEIGHT: 296.5 LBS

## 2024-02-29 DIAGNOSIS — J32.9 CHRONIC RHINOSINUSITIS: ICD-10-CM

## 2024-02-29 DIAGNOSIS — I27.20 PULMONARY HYPERTENSION: ICD-10-CM

## 2024-02-29 DIAGNOSIS — I10 ESSENTIAL HYPERTENSION: ICD-10-CM

## 2024-02-29 DIAGNOSIS — B08.4 HAND, FOOT AND MOUTH DISEASE: Primary | ICD-10-CM

## 2024-02-29 DIAGNOSIS — T84.84XS PAIN IN PROSTHETIC JOINT, SEQUELA: ICD-10-CM

## 2024-02-29 DIAGNOSIS — F32.4 MAJOR DEPRESSIVE DISORDER WITH SINGLE EPISODE, IN PARTIAL REMISSION: ICD-10-CM

## 2024-02-29 DIAGNOSIS — J42 CHRONIC BRONCHITIS, UNSPECIFIED CHRONIC BRONCHITIS TYPE: ICD-10-CM

## 2024-02-29 DIAGNOSIS — E66.01 SEVERE OBESITY (BMI >= 40): Chronic | ICD-10-CM

## 2024-02-29 DIAGNOSIS — R07.0 THROAT PAIN IN ADULT: ICD-10-CM

## 2024-02-29 DIAGNOSIS — E11.69 TYPE 2 DIABETES MELLITUS WITH OTHER SPECIFIED COMPLICATION, WITHOUT LONG-TERM CURRENT USE OF INSULIN: ICD-10-CM

## 2024-02-29 PROBLEM — L03.115 CELLULITIS OF RIGHT LOWER EXTREMITY: Status: RESOLVED | Noted: 2022-06-23 | Resolved: 2024-02-29

## 2024-02-29 PROBLEM — Z01.818 PREOP EXAMINATION: Status: RESOLVED | Noted: 2022-06-02 | Resolved: 2024-02-29

## 2024-02-29 LAB
ALBUMIN SERPL BCP-MCNC: 4.1 G/DL (ref 3.5–5.2)
ALP SERPL-CCNC: 92 U/L (ref 55–135)
ALT SERPL W/O P-5'-P-CCNC: 51 U/L (ref 10–44)
ANION GAP SERPL CALC-SCNC: 13 MMOL/L (ref 8–16)
AST SERPL-CCNC: 36 U/L (ref 10–40)
BASOPHILS # BLD AUTO: 0.05 K/UL (ref 0–0.2)
BASOPHILS NFR BLD: 0.7 % (ref 0–1.9)
BILIRUB SERPL-MCNC: 0.3 MG/DL (ref 0.1–1)
BUN SERPL-MCNC: 11 MG/DL (ref 8–23)
CALCIUM SERPL-MCNC: 9.9 MG/DL (ref 8.7–10.5)
CHLORIDE SERPL-SCNC: 95 MMOL/L (ref 95–110)
CHOLEST SERPL-MCNC: 269 MG/DL (ref 120–199)
CHOLEST/HDLC SERPL: 6.6 {RATIO} (ref 2–5)
CO2 SERPL-SCNC: 30 MMOL/L (ref 23–29)
CREAT SERPL-MCNC: 0.8 MG/DL (ref 0.5–1.4)
DIFFERENTIAL METHOD BLD: ABNORMAL
EOSINOPHIL # BLD AUTO: 0.2 K/UL (ref 0–0.5)
EOSINOPHIL NFR BLD: 2 % (ref 0–8)
ERYTHROCYTE [DISTWIDTH] IN BLOOD BY AUTOMATED COUNT: 14 % (ref 11.5–14.5)
EST. GFR  (NO RACE VARIABLE): >60 ML/MIN/1.73 M^2
ESTIMATED AVG GLUCOSE: 177 MG/DL (ref 68–131)
GLUCOSE SERPL-MCNC: 155 MG/DL (ref 70–110)
HBA1C MFR BLD: 7.8 % (ref 4–5.6)
HCT VFR BLD AUTO: 49.9 % (ref 40–54)
HDLC SERPL-MCNC: 41 MG/DL (ref 40–75)
HDLC SERPL: 15.2 % (ref 20–50)
HGB BLD-MCNC: 15.6 G/DL (ref 14–18)
IMM GRANULOCYTES # BLD AUTO: 0.03 K/UL (ref 0–0.04)
IMM GRANULOCYTES NFR BLD AUTO: 0.4 % (ref 0–0.5)
LDLC SERPL CALC-MCNC: ABNORMAL MG/DL (ref 63–159)
LYMPHOCYTES # BLD AUTO: 1.5 K/UL (ref 1–4.8)
LYMPHOCYTES NFR BLD: 20.3 % (ref 18–48)
MCH RBC QN AUTO: 28.5 PG (ref 27–31)
MCHC RBC AUTO-ENTMCNC: 31.3 G/DL (ref 32–36)
MCV RBC AUTO: 91 FL (ref 82–98)
MONOCYTES # BLD AUTO: 0.7 K/UL (ref 0.3–1)
MONOCYTES NFR BLD: 9.4 % (ref 4–15)
NEUTROPHILS # BLD AUTO: 5.1 K/UL (ref 1.8–7.7)
NEUTROPHILS NFR BLD: 67.2 % (ref 38–73)
NONHDLC SERPL-MCNC: 228 MG/DL
NRBC BLD-RTO: 0 /100 WBC
PLATELET # BLD AUTO: 245 K/UL (ref 150–450)
PMV BLD AUTO: 10 FL (ref 9.2–12.9)
POTASSIUM SERPL-SCNC: 4.7 MMOL/L (ref 3.5–5.1)
PROT SERPL-MCNC: 7.7 G/DL (ref 6–8.4)
RBC # BLD AUTO: 5.48 M/UL (ref 4.6–6.2)
SODIUM SERPL-SCNC: 138 MMOL/L (ref 136–145)
TRIGL SERPL-MCNC: 530 MG/DL (ref 30–150)
TSH SERPL DL<=0.005 MIU/L-ACNC: 1.86 UIU/ML (ref 0.4–4)
WBC # BLD AUTO: 7.54 K/UL (ref 3.9–12.7)

## 2024-02-29 PROCEDURE — 84443 ASSAY THYROID STIM HORMONE: CPT | Performed by: STUDENT IN AN ORGANIZED HEALTH CARE EDUCATION/TRAINING PROGRAM

## 2024-02-29 PROCEDURE — 3079F DIAST BP 80-89 MM HG: CPT | Mod: CPTII,S$GLB,, | Performed by: STUDENT IN AN ORGANIZED HEALTH CARE EDUCATION/TRAINING PROGRAM

## 2024-02-29 PROCEDURE — 99215 OFFICE O/P EST HI 40 MIN: CPT | Mod: S$GLB,,, | Performed by: STUDENT IN AN ORGANIZED HEALTH CARE EDUCATION/TRAINING PROGRAM

## 2024-02-29 PROCEDURE — 85025 COMPLETE CBC W/AUTO DIFF WBC: CPT | Performed by: STUDENT IN AN ORGANIZED HEALTH CARE EDUCATION/TRAINING PROGRAM

## 2024-02-29 PROCEDURE — 36415 COLL VENOUS BLD VENIPUNCTURE: CPT | Performed by: STUDENT IN AN ORGANIZED HEALTH CARE EDUCATION/TRAINING PROGRAM

## 2024-02-29 PROCEDURE — 83036 HEMOGLOBIN GLYCOSYLATED A1C: CPT | Performed by: STUDENT IN AN ORGANIZED HEALTH CARE EDUCATION/TRAINING PROGRAM

## 2024-02-29 PROCEDURE — 99999 PR PBB SHADOW E&M-EST. PATIENT-LVL IV: CPT | Mod: PBBFAC,,, | Performed by: STUDENT IN AN ORGANIZED HEALTH CARE EDUCATION/TRAINING PROGRAM

## 2024-02-29 PROCEDURE — 1159F MED LIST DOCD IN RCRD: CPT | Mod: CPTII,S$GLB,, | Performed by: STUDENT IN AN ORGANIZED HEALTH CARE EDUCATION/TRAINING PROGRAM

## 2024-02-29 PROCEDURE — 4010F ACE/ARB THERAPY RXD/TAKEN: CPT | Mod: CPTII,S$GLB,, | Performed by: STUDENT IN AN ORGANIZED HEALTH CARE EDUCATION/TRAINING PROGRAM

## 2024-02-29 PROCEDURE — 80061 LIPID PANEL: CPT | Performed by: STUDENT IN AN ORGANIZED HEALTH CARE EDUCATION/TRAINING PROGRAM

## 2024-02-29 PROCEDURE — 80053 COMPREHEN METABOLIC PANEL: CPT | Performed by: STUDENT IN AN ORGANIZED HEALTH CARE EDUCATION/TRAINING PROGRAM

## 2024-02-29 PROCEDURE — 1160F RVW MEDS BY RX/DR IN RCRD: CPT | Mod: CPTII,S$GLB,, | Performed by: STUDENT IN AN ORGANIZED HEALTH CARE EDUCATION/TRAINING PROGRAM

## 2024-02-29 PROCEDURE — 3008F BODY MASS INDEX DOCD: CPT | Mod: CPTII,S$GLB,, | Performed by: STUDENT IN AN ORGANIZED HEALTH CARE EDUCATION/TRAINING PROGRAM

## 2024-02-29 PROCEDURE — 3077F SYST BP >= 140 MM HG: CPT | Mod: CPTII,S$GLB,, | Performed by: STUDENT IN AN ORGANIZED HEALTH CARE EDUCATION/TRAINING PROGRAM

## 2024-02-29 RX ORDER — NAPROXEN 500 MG/1
500 TABLET ORAL 2 TIMES DAILY PRN
Qty: 90 TABLET | Refills: 0 | Status: SHIPPED | OUTPATIENT
Start: 2024-02-29

## 2024-02-29 RX ORDER — METFORMIN HYDROCHLORIDE 500 MG/1
500 TABLET, EXTENDED RELEASE ORAL 2 TIMES DAILY WITH MEALS
Qty: 180 TABLET | Refills: 3 | Status: SHIPPED | OUTPATIENT
Start: 2024-02-29 | End: 2025-02-23

## 2024-02-29 RX ORDER — FLUTICASONE PROPIONATE 50 MCG
1 SPRAY, SUSPENSION (ML) NASAL DAILY PRN
Qty: 48 G | Refills: 3 | Status: SHIPPED | OUTPATIENT
Start: 2024-02-29

## 2024-02-29 RX ORDER — DULAGLUTIDE 0.75 MG/.5ML
0.75 INJECTION, SOLUTION SUBCUTANEOUS
Qty: 12 PEN | Refills: 0 | Status: SHIPPED | OUTPATIENT
Start: 2024-02-29 | End: 2024-04-01

## 2024-02-29 RX ORDER — AMLODIPINE AND BENAZEPRIL HYDROCHLORIDE 5; 20 MG/1; MG/1
1 CAPSULE ORAL DAILY
Qty: 90 CAPSULE | Refills: 3 | Status: SHIPPED | OUTPATIENT
Start: 2024-02-29 | End: 2024-04-01

## 2024-02-29 RX ORDER — METHYLPREDNISOLONE 4 MG/1
TABLET ORAL
Qty: 21 TABLET | Refills: 0 | Status: SHIPPED | OUTPATIENT
Start: 2024-02-29

## 2024-02-29 RX ORDER — DICLOFENAC SODIUM 10 MG/G
2 GEL TOPICAL 4 TIMES DAILY PRN
Qty: 50 G | Refills: 0 | Status: SHIPPED | OUTPATIENT
Start: 2024-02-29

## 2024-02-29 RX ORDER — CITALOPRAM 40 MG/1
40 TABLET, FILM COATED ORAL DAILY
Qty: 90 TABLET | Refills: 3 | Status: SHIPPED | OUTPATIENT
Start: 2024-02-29 | End: 2025-02-23

## 2024-02-29 NOTE — PATIENT INSTRUCTIONS
Labs have been ordered. Please have completed today.    New medications prescribed:   1) Trulicity - injectable for diabetes  2) Steroids - for throat pain    All other medication(s) have been refilled as detailed below. Please  at pharmacy and take as prescribed.     Obtain a blood  pressure cuff at [facility] or online. Read attached information about checking your blood pressure. Check your blood pressure daily. Write your numbers down.

## 2024-02-29 NOTE — PROGRESS NOTES
[facility] VISIT    CHIEF COMPLAINT:   Chief Complaint   Patient presents with    hand feet and mouth      Pt states he went to a clinic and they told him he had hands, feet and mouth. He states they gave him medication to try to help it. Antacid, Lidocaine 2% oral sol, prednisolone. Pt states he will like to try to get on mounjaro, he say that he is gaining weight and have diabetes .       HISTORY OF PRESENT ILLNESS: Ned Brock is a 63 y.o. male who presents here today for the following:    Hand, Foot, & Mouth disease  This was reportedly evaluated and diagnosed at an outside facility  ~ 2/10. He was prescribed an antacid (alumina, magnesia, and simethicone), lidocaine/prednisolone/maalox solution, and Azithromycin x 10 days. These medications helped only slightly. Patient reports persistent throat pain and he continues to have ulcers on his tongue. He has noticed a hoarse voice as well. He has some pain with swallowing - especially with more solid foods. Able to tolerate liquids and softer foods without difficulty.     Diabetes  Patient also is interested in changing his diabetes regimen. His A1c has been well controlled but he is struggling with weight control and therefore interested in Ozempic or Mounjaro. His current regimen is Metformin  mg BID. He was previously on Trulicity in 2022 but his insurance stopped covering it. He reports he tolerated this medication well and would be open to resuming it as he believes his insurance has changed and now covers this medication.     Hypertension  BP is elevated today at 174/82. He reports he felt stressed rushing into his appointment. Current regimen is amlodipine-benazepril 5-20 mg with which he believes he is compliant - however last filled 1/2 for only 30 days with no refills. In fact, on chart review most of his medications were filled for just 30 days with 0 refills on 1/2. He would like medications refilled today.      MEDICAL HISTORY:    Past  Medical History:   Diagnosis Date    Abdominal hernia 01/2022    BPH without obstruction/lower urinary tract symptoms     Elevated PSA     Gall stones     S/P CHOLECYSTECTOMY IN 2017    Hypoxia     Infection of total knee replacement 06/22/2022    RIGHT, S/P SURGERGY ON 6/7/22    YANETH on CPAP     Pneumonia due to COVID-19 virus 01/12/2022       MEDICATIONS:    Current Outpatient Medications on File Prior to Visit   Medication Sig Dispense Refill    albuterol (ACCUNEB) 0.63 mg/3 mL Nebu Take 11.9 mLs (2.499 mg total) by nebulization every 6 (six) hours as needed (wheezing/shortness of breath). Rescue 75 mL 3    ascorbic acid (VITAMIN C ORAL) Take 1 capsule by mouth once daily.      cetirizine (ZYRTEC) 10 MG tablet Take 1 tablet (10 mg total) by mouth once daily. 30 tablet 0    ergocalciferol, vitamin D2, (VITAMIN D ORAL) Take 1 capsule by mouth once daily.      pulse oximeter (PULSE OXIMETER) device Use twice daily at 8 AM and 3 PM and record the value in [facility] as directed. 1 each 0    ZINC ORAL Take 1 tablet by mouth once daily.      [DISCONTINUED] amlodipine-benazepril 5-20 mg (LOTREL) 5-20 mg per capsule Take 1 capsule by mouth once daily. 30 capsule 0    [DISCONTINUED] amoxicillin (AMOXIL) 875 MG tablet Take 875 mg by mouth every 12 (twelve) hours.      [DISCONTINUED] citalopram (CELEXA) 40 MG tablet Take 1 tablet (40 mg total) by mouth once daily. 30 tablet 0    [DISCONTINUED] clotrimazole (LOTRIMIN) 1 % Soln Apply topically 2 (two) times daily. 10 mL 0    [DISCONTINUED] diclofenac sodium (VOLTAREN) 1 % Gel Apply 2 g topically 4 (four) times daily. 100 g 1    [DISCONTINUED] diclofenac sodium (VOLTAREN) 1 % Gel Apply 2 g topically 4 (four) times daily as needed (Pain). 50 g 0    [DISCONTINUED] flash glucose sensor (FREESTYLE JANETH 14 DAY SENSOR) Kit IU 1 kit 11    [DISCONTINUED] fluticasone propionate (FLONASE) 50 mcg/actuation nasal spray 1 spray by Each Nostril route daily as needed for Rhinitis.       "[DISCONTINUED] fluticasone propionate (FLONASE) 50 mcg/actuation nasal spray 1 spray (50 mcg total) by Each Nostril route once daily. 11.1 mL 0    [DISCONTINUED] guaiFENesin (MUCINEX) 600 mg 12 hr tablet Take 1,200 mg by mouth 2 (two) times daily as needed for Congestion.      [DISCONTINUED] metFORMIN (GLUCOPHAGE-XR) 500 MG ER 24hr tablet Take 1 tablet (500 mg total) by mouth 2 (two) times daily with meals. 60 tablet 0    [DISCONTINUED] methylPREDNISolone (MEDROL DOSEPACK) 4 mg tablet use as directed 1 each 0    [DISCONTINUED] naproxen (NAPROSYN) 500 MG tablet Take 1 tablet (500 mg total) by mouth 2 (two) times daily as needed (Pain). 30 tablet 0    [DISCONTINUED] oxyCODONE-acetaminophen (PERCOCET) 5-325 mg per tablet Take 1 tablet by mouth every 4 (four) hours as needed for Pain. 12 tablet 0    [DISCONTINUED] traMADoL (ULTRAM) 50 mg tablet Take 50 mg by mouth every 6 (six) hours.       No current facility-administered medications on file prior to visit.       PHYSICAL EXAM:    BP (!) 174/82 (BP Location: Left arm, Patient Position: Sitting)   Pulse 90   Ht 5' 10" (1.778 m)   Wt 134.5 kg (296 lb 8.3 oz)   SpO2 95%   BMI 42.55 kg/m²     Physical Exam  Vitals and nursing note reviewed.   Constitutional:       General: He is not in acute distress.     Appearance: Normal appearance. He is obese. He is not ill-appearing, toxic-appearing or diaphoretic.   HENT:      Head: Normocephalic and atraumatic.      Nose: Nose normal.      Mouth/Throat:      Mouth: Mucous membranes are moist.      Tongue: Lesions present.      Palate: No lesions.      Pharynx: No pharyngeal swelling, oropharyngeal exudate or posterior oropharyngeal erythema.        Comments: Small ulcers in region depicted  Eyes:      Extraocular Movements: Extraocular movements intact.      Conjunctiva/sclera: Conjunctivae normal.      Pupils: Pupils are equal, round, and reactive to light.   Cardiovascular:      Rate and Rhythm: Normal rate and regular " rhythm.      Heart sounds: Normal heart sounds. No murmur heard.  Pulmonary:      Effort: Pulmonary effort is normal. No respiratory distress.      Breath sounds: Normal breath sounds. No wheezing.   Musculoskeletal:         General: No deformity. Normal range of motion.   Skin:     Findings: No lesion or rash.   Neurological:      General: No focal deficit present.      Mental Status: He is alert.      Motor: No weakness.      Gait: Gait normal.   Psychiatric:         Mood and Affect: Mood normal.         Behavior: Behavior normal.         Thought Content: Thought content normal.         Judgment: Judgment normal.            ASSESSMENT & PLAN:    Ned was seen today for hand feet and mouth .    Diagnoses and all orders for this visit:    Hand, foot and mouth disease  Throat pain in adult  Patient diagnosed with HFMD at outside facility ~2/10 continues to have throat pain despite use of topicals prescribed at outside facility.   On exam, he has two healing ulcers on tongue however no other abnormalities noted.   Continue liquid/soft foods; avoidance of foods that exacerbate symptoms.   Short oral steroid course for additional relief.   -     methylPREDNISolone (MEDROL DOSEPACK) 4 mg tablet; use as directed    Type 2 diabetes mellitus with other specified complication, without long-term current use of insulin  Diabetes is controlled. Last A1c was 6.5% in February 2023.  Current regimen: metformin 500 mg BID, compliant most of the time  Updated A1c & urine microalbumin ordered.   Lipid panel ordered - consider addition of statin to regimen.   Continue Metformin  mg BID - refilled today.   Add Dulaglutide to regimen - start with 0.75 mg  weekly, titrate up.  -     metFORMIN (GLUCOPHAGE-XR) 500 MG ER 24hr tablet; Take 1 tablet (500 mg total) by mouth 2 (two) times daily with meals.  -     Hemoglobin A1C; Future; Expected date: 02/29/2024  -     Lipid Panel; Future; Expected date: 02/29/2024  -      Microalbumin/creatinine urine ratio; Future; Expected date: 02/29/2024  -     dulaglutide (TRULICITY) 0.75 mg/0.5 mL pen injector; Inject 0.75 mg into the skin every 7 days.    Severe obesity (BMI >= 40)  BMI 42.55.   Labs as below to assess for contributing/comorbid conditions.   -     CBC Auto Differential; Future; Expected date: 02/29/2024  -     Comprehensive Metabolic Panel; Future; Expected date: 02/29/2024  -     TSH; Future; Expected date: 02/29/2024    Essential hypertension  BP elevated at  174/82 today but he thinks he forgot BP medication the last few days and also was rushing into his appointment as he was running late.   Continue amlodipine-benazepril - refilled.   Advised to obtain home BP machine and monitor BP at home.   -     amlodipine-benazepril 5-20 mg (LOTREL) 5-20 mg per capsule; Take 1 capsule by mouth once daily.    Major depressive disorder with single episode, in partial remission  Controlled with Celexa - refilled.  -     citalopram (CELEXA) 40 MG tablet; Take 1 tablet (40 mg total) by mouth once daily.    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 Continues to ambulate with cane.   Topical diclofenac & naproxen refilled.  -     diclofenac sodium (VOLTAREN) 1 % Gel; Apply 2 g topically 4 (four) times daily as needed (Pain).  -     naproxen (NAPROSYN) 500 MG tablet; Take 1 tablet (500 mg total) by mouth 2 (two) times daily as needed (Pain).    Chronic rhinosinusitis  Controlled with PRN Flonase & Zyrtec - refilled today  -     fluticasone propionate (FLONASE) 50 mcg/actuation nasal spray; 1 spray (50 mcg total) by Each Nostril route daily as needed for Rhinitis.    Chronic bronchitis, unspecified chronic bronchitis type  Controlled with PRN albuterol - no refills needed      Patient to return in ~4 weeks to review labs.         Fior Shipley MD  [facility]          I spent a total of 48 minutes on the day of the visit.  This includes face to face time and non-face to  face time preparing to see the patient (eg, review of tests), obtaining and/or reviewing separately obtained history, documenting clinical information in the electronic or other health record, independently interpreting results and communicating results to the patient/family/caregiver, or care coordinator.

## 2024-03-06 DIAGNOSIS — E11.9 TYPE 2 DIABETES MELLITUS WITHOUT COMPLICATION, UNSPECIFIED WHETHER LONG TERM INSULIN USE: ICD-10-CM

## 2024-03-11 ENCOUNTER — PATIENT MESSAGE (OUTPATIENT)
Dept: ADMINISTRATIVE | Facility: HOSPITAL | Age: 64
End: 2024-03-11
Payer: COMMERCIAL

## 2024-04-01 ENCOUNTER — OFFICE VISIT (OUTPATIENT)
Dept: INTERNAL MEDICINE | Facility: CLINIC | Age: 64
End: 2024-04-01
Payer: COMMERCIAL

## 2024-04-01 VITALS
HEART RATE: 93 BPM | WEIGHT: 286.19 LBS | SYSTOLIC BLOOD PRESSURE: 170 MMHG | OXYGEN SATURATION: 97 % | BODY MASS INDEX: 40.97 KG/M2 | DIASTOLIC BLOOD PRESSURE: 78 MMHG | HEIGHT: 70 IN

## 2024-04-01 DIAGNOSIS — E78.5 DYSLIPIDEMIA ASSOCIATED WITH TYPE 2 DIABETES MELLITUS: ICD-10-CM

## 2024-04-01 DIAGNOSIS — I10 ESSENTIAL HYPERTENSION: Chronic | ICD-10-CM

## 2024-04-01 DIAGNOSIS — E11.69 TYPE 2 DIABETES MELLITUS WITH OTHER SPECIFIED COMPLICATION, WITHOUT LONG-TERM CURRENT USE OF INSULIN: Primary | ICD-10-CM

## 2024-04-01 DIAGNOSIS — M17.12 OSTEOARTHRITIS OF LEFT KNEE, UNSPECIFIED OSTEOARTHRITIS TYPE: ICD-10-CM

## 2024-04-01 DIAGNOSIS — E11.69 DYSLIPIDEMIA ASSOCIATED WITH TYPE 2 DIABETES MELLITUS: ICD-10-CM

## 2024-04-01 PROCEDURE — 4010F ACE/ARB THERAPY RXD/TAKEN: CPT | Mod: CPTII,S$GLB,, | Performed by: STUDENT IN AN ORGANIZED HEALTH CARE EDUCATION/TRAINING PROGRAM

## 2024-04-01 PROCEDURE — 99214 OFFICE O/P EST MOD 30 MIN: CPT | Mod: S$GLB,,, | Performed by: STUDENT IN AN ORGANIZED HEALTH CARE EDUCATION/TRAINING PROGRAM

## 2024-04-01 PROCEDURE — 3078F DIAST BP <80 MM HG: CPT | Mod: CPTII,S$GLB,, | Performed by: STUDENT IN AN ORGANIZED HEALTH CARE EDUCATION/TRAINING PROGRAM

## 2024-04-01 PROCEDURE — 1159F MED LIST DOCD IN RCRD: CPT | Mod: CPTII,S$GLB,, | Performed by: STUDENT IN AN ORGANIZED HEALTH CARE EDUCATION/TRAINING PROGRAM

## 2024-04-01 PROCEDURE — 99999 PR PBB SHADOW E&M-EST. PATIENT-LVL IV: CPT | Mod: PBBFAC,,, | Performed by: STUDENT IN AN ORGANIZED HEALTH CARE EDUCATION/TRAINING PROGRAM

## 2024-04-01 PROCEDURE — 3077F SYST BP >= 140 MM HG: CPT | Mod: CPTII,S$GLB,, | Performed by: STUDENT IN AN ORGANIZED HEALTH CARE EDUCATION/TRAINING PROGRAM

## 2024-04-01 PROCEDURE — 3008F BODY MASS INDEX DOCD: CPT | Mod: CPTII,S$GLB,, | Performed by: STUDENT IN AN ORGANIZED HEALTH CARE EDUCATION/TRAINING PROGRAM

## 2024-04-01 PROCEDURE — 1160F RVW MEDS BY RX/DR IN RCRD: CPT | Mod: CPTII,S$GLB,, | Performed by: STUDENT IN AN ORGANIZED HEALTH CARE EDUCATION/TRAINING PROGRAM

## 2024-04-01 PROCEDURE — 3051F HG A1C>EQUAL 7.0%<8.0%: CPT | Mod: CPTII,S$GLB,, | Performed by: STUDENT IN AN ORGANIZED HEALTH CARE EDUCATION/TRAINING PROGRAM

## 2024-04-01 RX ORDER — ATORVASTATIN CALCIUM 40 MG/1
40 TABLET, FILM COATED ORAL DAILY
Qty: 90 TABLET | Refills: 3 | Status: SHIPPED | OUTPATIENT
Start: 2024-04-01 | End: 2025-03-27

## 2024-04-01 RX ORDER — AMLODIPINE AND BENAZEPRIL HYDROCHLORIDE 5; 40 MG/1; MG/1
1 CAPSULE ORAL DAILY
Qty: 90 CAPSULE | Refills: 3 | Status: SHIPPED | OUTPATIENT
Start: 2024-04-01 | End: 2025-04-01

## 2024-04-01 RX ORDER — TRAMADOL HYDROCHLORIDE 50 MG/1
50 TABLET ORAL EVERY 6 HOURS PRN
Qty: 60 TABLET | Refills: 0 | Status: SHIPPED | OUTPATIENT
Start: 2024-04-01 | End: 2024-05-01

## 2024-04-01 NOTE — PROGRESS NOTES
[facility] VISIT      CHIEF COMPLAINT:   Chief Complaint   Patient presents with    Follow-up     Pt states his left knee hurts, it swollen pain 10        HISTORY OF PRESENT ILLNESS: Ned Brock is a 63 y.o. male who presents here today for the following:    Knee pain  Patient reports pain and swelling of left knee. He follows with Orthopedics and just got an injection this last Friday. He reports that this did not help the pain. He is inquiring about oral medicine for the pain. He was previously on Tramadol, Oxycodone, Hydrocodone, Tylenol #3 - he does not recall what was most helpful for him before. The pain keeps him from sleeping. He previously had knee replacement right side and now feels it is time to have left side replaced. However he has to wait a few months since he just got the injection. He takes Naproxen and uses Diclofenac gel.     Disability paperwork  Patient needs paperwork for disability renewed. He is on disability for long COVID & chronic knee pain. He reports Dr. Willis previously filled out the paperwork.    Diabetes  Started on Dulaglutide 0.75 mg last visit which he is tolerating well. Updated A1c ordered last visit revealed worsening from 6.5% in Feb 2023 to 7.8% in Feb 2024. He also takes Metformin  mg BID.    Hypertension  174/82 at last visit - uncertain medical compliance at that time. All medications were refilled last visit and he was provided with medication list. Current regimen amlodipine-benazepril with which he is compliant. BP is 170/78 today. He has not been checking BP at home. Patient denies symptoms including headache, vision changes, chest pain, lightheadedness.    Hyperlipidemia  Lipid panel ordered last visit with multiple abnormalities and ASCVD risk score of 42! He reports he has never been on a statin.      REVIEW OF SYSTEMS:    ROS as in HPI.      MEDICAL HISTORY:    Past Medical History:   Diagnosis Date    Abdominal hernia 01/2022    BPH without  obstruction/lower urinary tract symptoms     Elevated PSA     Gall stones     S/P CHOLECYSTECTOMY IN 2017    Hypoxia     Infection of total knee replacement 06/22/2022    RIGHT, S/P SURGERGY ON 6/7/22    YANETH on CPAP     Pneumonia due to COVID-19 virus 01/12/2022       MEDICATIONS:    Current Outpatient Medications on File Prior to Visit   Medication Sig Dispense Refill    albuterol (ACCUNEB) 0.63 mg/3 mL Nebu Take 11.9 mLs (2.499 mg total) by nebulization every 6 (six) hours as needed (wheezing/shortness of breath). Rescue 75 mL 3    amlodipine-benazepril 5-20 mg (LOTREL) 5-20 mg per capsule Take 1 capsule by mouth once daily. 90 capsule 3    ascorbic acid (VITAMIN C ORAL) Take 1 capsule by mouth once daily.      cetirizine (ZYRTEC) 10 MG tablet Take 1 tablet (10 mg total) by mouth once daily. 30 tablet 0    citalopram (CELEXA) 40 MG tablet Take 1 tablet (40 mg total) by mouth once daily. 90 tablet 3    diclofenac sodium (VOLTAREN) 1 % Gel Apply 2 g topically 4 (four) times daily as needed (Pain). 50 g 0    dulaglutide (TRULICITY) 0.75 mg/0.5 mL pen injector Inject 0.75 mg into the skin every 7 days. 12 pen 0    ergocalciferol, vitamin D2, (VITAMIN D ORAL) Take 1 capsule by mouth once daily.      fluticasone propionate (FLONASE) 50 mcg/actuation nasal spray 1 spray (50 mcg total) by Each Nostril route daily as needed for Rhinitis. 48 g 3    metFORMIN (GLUCOPHAGE-XR) 500 MG ER 24hr tablet Take 1 tablet (500 mg total) by mouth 2 (two) times daily with meals. 180 tablet 3    methylPREDNISolone (MEDROL DOSEPACK) 4 mg tablet use as directed 21 tablet 0    naproxen (NAPROSYN) 500 MG tablet Take 1 tablet (500 mg total) by mouth 2 (two) times daily as needed (Pain). 90 tablet 0    pulse oximeter (PULSE OXIMETER) device Use twice daily at 8 AM and 3 PM and [facility]. 1 each 0    ZINC ORAL Take 1 tablet by mouth once daily.       No current facility-administered medications on file prior to  "visit.       PHYSICAL EXAM:    BP (!) 170/78 (BP Location: Left arm, Patient Position: Sitting)   Pulse 93   Ht 5' 10" (1.778 m)   Wt 129.8 kg (286 lb 2.5 oz)   SpO2 97%   BMI 41.06 kg/m²     Physical Exam  Vitals and nursing note reviewed.   Constitutional:       General: He is not in acute distress.     Appearance: Normal appearance. He is not ill-appearing, toxic-appearing or diaphoretic.   HENT:      Head: Normocephalic and atraumatic.      Nose: Nose normal.   Eyes:      Extraocular Movements: Extraocular movements intact.      Conjunctiva/sclera: Conjunctivae normal.      Pupils: Pupils are equal, round, and reactive to light.   Cardiovascular:      Rate and Rhythm: Normal rate and regular rhythm.      Heart sounds: Normal heart sounds. No murmur heard.  Pulmonary:      Effort: Pulmonary effort is normal. No respiratory distress.      Breath sounds: Normal breath sounds. No wheezing.   Musculoskeletal:         General: No deformity. Normal range of motion.      Left knee: Swelling present. No erythema. Normal range of motion. Tenderness present.   Skin:     Findings: No lesion or rash.   Neurological:      General: No focal deficit present.      Mental Status: He is alert.      Motor: No weakness.      Gait: Gait normal.   Psychiatric:         Mood and Affect: Mood normal.         Behavior: Behavior normal.         Thought Content: Thought content normal.         Judgment: Judgment normal.           LABS:    Lab Results   Component Value Date     02/29/2024    K 4.7 02/29/2024    CL 95 02/29/2024    CO2 30 (H) 02/29/2024    BUN 11 02/29/2024    CREATININE 0.8 02/29/2024    CALCIUM 9.9 02/29/2024    ANIONGAP 13 02/29/2024    EGFRNORACEVR >60.0 02/29/2024     Lab Results   Component Value Date    ALT 51 (H) 02/29/2024    AST 36 02/29/2024    ALKPHOS 92 02/29/2024    BILITOT 0.3 02/29/2024     Lab Results   Component Value Date    WBC 7.54 02/29/2024    HGB 15.6 02/29/2024    HCT 49.9 02/29/2024    MCV " 91 02/29/2024     02/29/2024     Lab Results   Component Value Date    HGBA1C 7.8 (H) 02/29/2024     Lab Results   Component Value Date    TSH 1.863 02/29/2024     Cholesterol   Date Value Ref Range Status   02/29/2024 269 (H) 120 - 199 mg/dL Final     Comment:     The National Cholesterol Education Program (NCEP) has set the  following guidelines (reference ranges) for Cholesterol:  Optimal.....................<200 mg/dL  Borderline High.............200-239 mg/dL  High........................> or = 240 mg/dL       HDL   Date Value Ref Range Status   02/29/2024 41 40 - 75 mg/dL Final     Comment:     The National Cholesterol Education Program (NCEP) has set the  following guidelines (reference values) for HDL Cholesterol:  Low...............<40 mg/dL  Optimal...........>60 mg/dL       LDL Cholesterol   Date Value Ref Range Status   02/29/2024 Invalid, Trig>400.0 63.0 - 159.0 mg/dL Final     Comment:     The National Cholesterol Education Program (NCEP) has set the  following guidelines (reference values) for LDL Cholesterol:  Optimal.......................<130 mg/dL  Borderline High...............130-159 mg/dL  High..........................160-189 mg/dL  Very High.....................>190 mg/dL       Triglycerides   Date Value Ref Range Status   02/29/2024 530 (H) 30 - 150 mg/dL Final     Comment:     The National Cholesterol Education Program (NCEP) has set the  following guidelines (reference values) for triglycerides:  Normal......................<150 mg/dL  Borderline High.............150-199 mg/dL  High........................200-499 mg/dL       The 10-year ASCVD risk score (Jayna DORSEY, et al., 2019) is: 42.8%    Values used to calculate the score:      Age: 63 years      Sex: Male      Is Non- : No      Diabetic: Yes      Tobacco smoker: No      Systolic Blood Pressure: 170 mmHg      Is BP treated: Yes      HDL Cholesterol: 41 mg/dL      Total Cholesterol: 269  mg/dL        ASSESSMENT & PLAN:    Ned was seen today for follow-up.    Diagnoses and all orders for this visit:    Type 2 diabetes mellitus with other specified complication, without long-term current use of insulin  A1c 7.8% last visit, worse from 6.5% last year  Dulaglutide 0.75 mg weekly added last visit - tolerating well  Increase Dulaglutide to 1.5 mg weekly  Continue Metformin  mg BID  Recheck A1c & F/U in ~2 months  -     dulaglutide (TRULICITY) 1.5 mg/0.5 mL pen injector; Inject 1.5 mg into the skin every 7 days.    Dyslipidemia associated with type 2 diabetes mellitus  Lipid panel with cholesterol 269 and ASCVD risk of 42.8%  Patient is candidate for high intensity statin given ASCVD risk & concurrent diabetes. Discussed and he is agreeable.   Atorvastatin 40 mg prescribed.  -     atorvastatin (LIPITOR) 40 MG tablet; Take 1 tablet (40 mg total) by mouth once daily.    Essential hypertension  /78, relatively unchanged from last visit. Patient does report compliance with medication regimen since last visit. He has not been checking BP at home.   Increase amlodipine-benazepril to 5-40 mg daily.   Check BP at home, bring log to next visit in ~2 months  -     amLODIPine-benazepriL (LOTREL) 5-40 mg per capsule; Take 1 capsule by mouth once daily.    Osteoarthritis of left knee, unspecified osteoarthritis type  Patient saw Orthopedist and had steroid injection last Friday with no improvement in pain thus far. He is planning to get knee replacement this upcoming summer. Inquiring about pain medicine to help him in the meantime as his pain is refractory to Naproxen and topical diclofenac.    reviewed - no suspicious activity.   Tramadol 50 mg Q6H PRN prescribed.  Continue F/U with Orthopedics.   -     traMADoL (ULTRAM) 50 mg tablet; Take 1 tablet (50 mg total) by mouth every 6 (six) hours as needed for Pain.      Patient to F/U in ~2 months for diabetes & hypertension F/U. He may also need  disability paperwork completed at that time.          Fior Shipley MD  [facility]

## 2024-04-01 NOTE — PATIENT INSTRUCTIONS
Medication changes:  - Start Atorvastatin (Lipitor) 40 mg for high cholesterol  - Start Tramadol (Ultram) 50 mg as needed for pain  - Increase Dulaglutide (Trulicity) to 1.5 mg   - Increase Amlodipine-Benazepril to 5-40 mg     Blood pressure monitoring:  Check your blood pressure daily. Write your numbers down.   If you notice many values greater than 130/80, please return to the clinic for further evaluation.      Follow-up in ~4-8 weeks to recheck blood pressure.

## 2024-04-09 ENCOUNTER — TELEPHONE (OUTPATIENT)
Dept: PHARMACY | Facility: CLINIC | Age: 64
End: 2024-04-09
Payer: COMMERCIAL

## 2024-06-10 ENCOUNTER — PATIENT MESSAGE (OUTPATIENT)
Dept: INTERNAL MEDICINE | Facility: CLINIC | Age: 64
End: 2024-06-10
Payer: COMMERCIAL

## 2024-10-16 ENCOUNTER — PATIENT MESSAGE (OUTPATIENT)
Dept: ADMINISTRATIVE | Facility: HOSPITAL | Age: 64
End: 2024-10-16
Payer: COMMERCIAL

## 2024-10-29 DIAGNOSIS — Z00.00 ENCOUNTER FOR MEDICARE ANNUAL WELLNESS EXAM: ICD-10-CM

## 2025-01-30 DIAGNOSIS — Z00.00 ENCOUNTER FOR MEDICARE ANNUAL WELLNESS EXAM: ICD-10-CM

## 2025-01-31 ENCOUNTER — PATIENT OUTREACH (OUTPATIENT)
Dept: INTERNAL MEDICINE | Facility: CLINIC | Age: 65
End: 2025-01-31
Payer: COMMERCIAL

## 2025-01-31 DIAGNOSIS — E11.9 DIABETIC EYE EXAM: ICD-10-CM

## 2025-01-31 DIAGNOSIS — E78.5 DYSLIPIDEMIA ASSOCIATED WITH TYPE 2 DIABETES MELLITUS: Primary | ICD-10-CM

## 2025-01-31 DIAGNOSIS — E11.69 DYSLIPIDEMIA ASSOCIATED WITH TYPE 2 DIABETES MELLITUS: Primary | ICD-10-CM

## 2025-01-31 DIAGNOSIS — Z12.11 COLON CANCER SCREENING: ICD-10-CM

## 2025-01-31 DIAGNOSIS — E11.69 TYPE 2 DIABETES MELLITUS WITH OTHER SPECIFIED COMPLICATION, WITHOUT LONG-TERM CURRENT USE OF INSULIN: ICD-10-CM

## 2025-01-31 DIAGNOSIS — Z01.00 DIABETIC EYE EXAM: ICD-10-CM

## 2025-01-31 NOTE — PROGRESS NOTES
Chart reviewed and updated. Reconciled immunizations, health maintenance and care everywhere.  Placed orders for diabetic eye exam. Fit kit and lab also sent portal msg.      Yolanda June [facility]  Panel Care Coordinator  [facility]  [phone]  For [facility], Donaldo MIRANDA MD

## 2025-05-17 DIAGNOSIS — F32.4 MAJOR DEPRESSIVE DISORDER WITH SINGLE EPISODE, IN PARTIAL REMISSION: ICD-10-CM

## 2025-05-17 DIAGNOSIS — E78.5 DYSLIPIDEMIA ASSOCIATED WITH TYPE 2 DIABETES MELLITUS: ICD-10-CM

## 2025-05-17 DIAGNOSIS — E11.69 DYSLIPIDEMIA ASSOCIATED WITH TYPE 2 DIABETES MELLITUS: ICD-10-CM

## 2025-05-17 DIAGNOSIS — J32.9 CHRONIC RHINOSINUSITIS: ICD-10-CM

## 2025-05-17 NOTE — TELEPHONE ENCOUNTER
Care Due:                  Date            Visit Type   Department     Provider  --------------------------------------------------------------------------------                                EP -                              PRIMARY      [facility] INTERNAL  Last Visit: 04-      CARE ([facility])   MEDICINE       Fior Shipley  Next Visit: None Scheduled  None         None Found                                                            Last  Test          Frequency    Reason                     Performed    Due Date  --------------------------------------------------------------------------------    Office Visit  12 months..  amLODIPine-benazepriL,     04- 03-                             atorvastatin, citalopram,                             diclofenac, metFORMIN,                             naproxen.................    CBC.........  12 months..  diclofenac, naproxen.....  02- 02-    CMP.........  12 months..  amLODIPine-benazepriL,     02- 02-                             atorvastatin, diclofenac,                             metFORMIN, naproxen......    HBA1C.......  6 months...  metFORMIN................  03- 08-    Lipid Panel.  12 months..  atorvastatin.............  02- 02-    [facility] Embedded Care Due Messages. Reference number: [id number].   5/17/2025 3:34:26 PM CDT

## 2025-05-18 ENCOUNTER — OFFICE VISIT (OUTPATIENT)
Dept: URGENT CARE | Facility: CLINIC | Age: 65
End: 2025-05-18
Payer: MEDICARE

## 2025-05-18 VITALS
SYSTOLIC BLOOD PRESSURE: 154 MMHG | TEMPERATURE: 99 F | RESPIRATION RATE: 18 BRPM | HEART RATE: 94 BPM | DIASTOLIC BLOOD PRESSURE: 86 MMHG | OXYGEN SATURATION: 96 % | WEIGHT: 280 LBS | BODY MASS INDEX: 40.09 KG/M2 | HEIGHT: 70 IN

## 2025-05-18 DIAGNOSIS — J32.9 CHRONIC RHINOSINUSITIS: ICD-10-CM

## 2025-05-18 DIAGNOSIS — T16.2XXA FOREIGN BODY OF LEFT EAR, INITIAL ENCOUNTER: ICD-10-CM

## 2025-05-18 DIAGNOSIS — J06.9 VIRAL URI: Primary | ICD-10-CM

## 2025-05-18 LAB
CTP QC/QA: YES
MOLECULAR STREP A: NEGATIVE

## 2025-05-18 PROCEDURE — 69200 CLEAR OUTER EAR CANAL: CPT | Mod: LT,S$GLB,,

## 2025-05-18 PROCEDURE — 99214 OFFICE O/P EST MOD 30 MIN: CPT | Mod: 25,S$GLB,,

## 2025-05-18 PROCEDURE — 87651 STREP A DNA AMP PROBE: CPT | Mod: QW,S$GLB,,

## 2025-05-18 RX ORDER — CETIRIZINE HYDROCHLORIDE 10 MG/1
10 TABLET ORAL DAILY
Qty: 30 TABLET | Refills: 0 | Status: SHIPPED | OUTPATIENT
Start: 2025-05-18 | End: 2025-06-17

## 2025-05-18 RX ORDER — LIDOCAINE HYDROCHLORIDE 20 MG/ML
SOLUTION OROPHARYNGEAL
COMMUNITY
Start: 2024-02-10

## 2025-05-18 RX ORDER — FLUTICASONE PROPIONATE 50 MCG
2 SPRAY, SUSPENSION (ML) NASAL DAILY
Qty: 15.8 ML | Refills: 0 | Status: SHIPPED | OUTPATIENT
Start: 2025-05-18

## 2025-05-18 RX ORDER — GUAIFENESIN 600 MG/1
TABLET, EXTENDED RELEASE ORAL
COMMUNITY

## 2025-05-18 RX ORDER — PROMETHAZINE HYDROCHLORIDE AND DEXTROMETHORPHAN HYDROBROMIDE 6.25; 15 MG/5ML; MG/5ML
5 SYRUP ORAL EVERY 6 HOURS PRN
Qty: 118 ML | Refills: 0 | Status: SHIPPED | OUTPATIENT
Start: 2025-05-18

## 2025-05-18 NOTE — PROCEDURES
Foreign body removal    Date/Time: 2025 11:15 AM    Performed by: Marlon Barnhart PA-C  Authorized by: Marlon Barnhart PA-C  Consent Done: Yes  Consent: Verbal consent obtained  Consent given by: patient  Patient identity confirmed:  and name  Body area: ear  Location details: left ear  Localization method: visualized  Removal mechanism: alligator forceps  Complexity: simple  1 objects recovered.  Objects recovered: piece of cotton from end of Q-tip  Post-procedure assessment: foreign body removed  Patient tolerance: Patient tolerated the procedure well with no immediate complications

## 2025-05-18 NOTE — PROGRESS NOTES
"Subjective:      Patient ID: Ned Brock is a 64 y.o. male.    Vitals:  height is 5' 10" (1.778 m) and weight is 127 kg (280 lb). His axillary temperature is 98.5 °F (36.9 °C). His blood pressure is 154/86 (abnormal) and his pulse is 94. His respiration is 18 and oxygen saturation is 96%.     Chief Complaint: Otalgia    65 y/o M here for congestion, cough, sore throat, L ear pain x1 week. Denies fever, chills, n/v/d.    Otalgia   There is pain in the left ear. This is a new problem. The current episode started in the past 7 days (1 week). The problem has been gradually worsening. There has been no fever. The pain is at a severity of 4/10. The pain is moderate. Associated symptoms include coughing and a sore throat. Pertinent negatives include no abdominal pain, diarrhea, ear discharge, headaches, hearing loss, neck pain, rash, rhinorrhea or vomiting. He has tried nothing for the symptoms.       HENT:  Positive for ear pain, congestion and sore throat. Negative for ear discharge and hearing loss.    Neck: Negative for neck pain.   Cardiovascular:  Negative for chest pain.   Respiratory:  Positive for cough.    Gastrointestinal:  Negative for abdominal pain, vomiting and diarrhea.   Skin:  Negative for rash.   Neurological:  Negative for headaches.      Objective:     Physical Exam   Constitutional: He is oriented to person, place, and time. He appears well-developed.   HENT:   Head: Normocephalic and atraumatic.   Ears:   Right Ear: Tympanic membrane, external ear and ear canal normal.   Left Ear: Tympanic membrane, external ear and ear canal normal.      Comments: Piece of cotton in L ear canal  Nose: Congestion present.   Mouth/Throat: Oropharynx is clear and moist. Mucous membranes are moist. Oropharynx is clear.   Eyes: Conjunctivae, EOM and lids are normal. Pupils are equal, round, and reactive to light.   Neck: Trachea normal and phonation normal. Neck supple.   Cardiovascular: Normal rate and regular rhythm. "   Pulmonary/Chest: Effort normal and breath sounds normal.   Musculoskeletal: Normal range of motion.         General: Normal range of motion.   Neurological: He is alert and oriented to person, place, and time.   Skin: Skin is warm, dry and intact.   Psychiatric: His speech is normal and behavior is normal. Judgment and thought content normal.   Nursing note and vitals reviewed.      Results for orders placed or performed in visit on 05/18/25   POCT Strep A, Molecular    Collection Time: 05/18/25  1:50 PM   Result Value Ref Range    Molecular Strep A, POC Negative Negative     Acceptable Yes          Assessment:     1. Viral URI    2. Foreign body of left ear, initial encounter    3. Chronic rhinosinusitis        Plan:       Viral URI  -     POCT Strep A, Molecular  -     fluticasone propionate (FLONASE) 50 mcg/actuation nasal spray; 2 sprays (100 mcg total) by Each Nostril route once daily.  Dispense: 15.8 mL; Refill: 0  -     cetirizine (ZYRTEC) 10 MG tablet; Take 1 tablet (10 mg total) by mouth once daily.  Dispense: 30 tablet; Refill: 0  -     promethazine-dextromethorphan (PROMETHAZINE-DM) 6.25-15 mg/5 mL Syrp; Take 5 mLs by mouth every 6 (six) hours as needed (cough).  Dispense: 118 mL; Refill: 0    Foreign body of left ear, initial encounter  -     Foreign body removal    Chronic rhinosinusitis    Patient here with upper respiratory symptoms x1 week.  Strep negative.  History of chronic rhinosinusitis.  Discussed with patient that symptoms are likely due to allergies versus a virus.  Discussed supportive care.  He also has a piece of cotton in his left ear canal.  This was removed successfully in the clinic.            Patient Instructions   - Rest.    - Drink plenty of fluids.  - Viral upper respiratory infections typically run their course in 10-14 days.      - You can take over-the-counter claritin, zyrtec, allegra, or xyzal as directed. These are antihistamines that can help with runny  nose, nasal congestion, sneezing, and helps to dry up post-nasal drip, which usually causes sore throat and cough.              - If you do NOT have high blood pressure, you may use a decongestant form (D)  of this medication (ie. Claritin- D, zyrtec-D, allegra-D) or if you do not take the D form, you can take sudafed (pseudoephedrine) over the counter, which is a decongestant. Do NOT take two decongestant (D) medications at the same time (such as mucinex-D and claritin-D or plain sudafed and claritin D)    - If you DO have Hypertension, anxiety, or palpitations, it is safe to take Coricidin HBP for relief of sinus symptoms.     - You can use Flonase (fluticasone) nasal spray as directed for sinus congestion and postnasal drip. This is a steroid nasal spray that works locally over time to decrease the inflammation in your nose/sinuses and help with allergic symptoms. This is not an quick- relief spray like afrin, but it works well if used daily.  Discontinue if you develop nose bleed  - use nasal saline prior to Flonase.  - Use Ocean Spray Nasal Saline 1-3 puffs each nostril every 2-3 hours then blow out onto tissue. This is to irrigate the nasal passage way to clear the sinus openings. Use until sinus problem resolved.     - you can take plain Mucinex (guaifenesin) 1200 mg twice a day to help loosen mucous.      -warm salt water gargles can help with sore throat     - warm tea with honey can help with cough. Honey is a natural cough suppressant.     - Dextromethorphan (DM) is a cough suppressant over the counter (ie. mucinex DM, robitussin, delsym; dayquil/nyquil has DM as well.)        - Follow up with your PCP or specialty clinic as directed in the next 1-2 weeks if not improved or as needed.  You can call [phone] to schedule an appointment with the appropriate provider.       - Go to the ER if you develop new or worsening symptoms.      - You must understand that you have received an Urgent Care treatment  only and that you may be released before all of your medical problems are known or treated.   - You, the patient, will arrange for follow up care as instructed.   - If your condition worsens or fails to improve we recommend that you receive another evaluation at the ER immediately or contact your PCP to discuss your concerns or return here.

## 2025-05-18 NOTE — PATIENT INSTRUCTIONS

## 2025-05-19 ENCOUNTER — TELEPHONE (OUTPATIENT)
Dept: INTERNAL MEDICINE | Facility: CLINIC | Age: 65
End: 2025-05-19
Payer: MEDICARE

## 2025-05-19 RX ORDER — FLUTICASONE PROPIONATE 50 MCG
SPRAY, SUSPENSION (ML) NASAL
Qty: 16 G | Refills: 0 | Status: SHIPPED | OUTPATIENT
Start: 2025-05-19

## 2025-05-19 RX ORDER — CITALOPRAM 40 MG/1
40 TABLET ORAL
Qty: 90 TABLET | Refills: 0 | Status: SHIPPED | OUTPATIENT
Start: 2025-05-19

## 2025-05-19 RX ORDER — ATORVASTATIN CALCIUM 40 MG/1
40 TABLET, FILM COATED ORAL
Qty: 90 TABLET | Refills: 0 | Status: SHIPPED | OUTPATIENT
Start: 2025-05-19

## 2025-05-21 NOTE — ASSESSMENT & PLAN NOTE
Patient presents to the ED today with 2 weeks cough that he says are similar to his normal chronic bronchitis exacerbations.  Says his wife tested positive for COVID on January 3rd.  At that time he tested negative.  He tested positive for Covid on January 7th after continuing to feel short of breath and not spiking a fever.  He was given a at home pulse ox by family member, and noted that it was reading from 87-90.  Contact the sister was a nurse who suggested that he present to the emergency department.  On arrival to the emergency department patient was found to be hypertensive to 214/101 and satting at 94% on room air.  Patient has a COVID risk score of 3.    -- Covid isolation   -- dexamethasone 6 mg daily for 10 days  -- remdesivir 200 mg load on day 1, followed by 100 mg daily for days 2 through 5  -- vitamin-C 500 mg b.i.d. daily  -- multivitamin daily  -- enoxaparin 120mg b.i.d.  -- supplemental oxygen p.r.n. with oxygen goal >92%   normal gait and station , no tenderness or deformities present

## 2025-05-23 DIAGNOSIS — E11.69 TYPE 2 DIABETES MELLITUS WITH OTHER SPECIFIED COMPLICATION, WITHOUT LONG-TERM CURRENT USE OF INSULIN: ICD-10-CM

## 2025-05-23 NOTE — TELEPHONE ENCOUNTER
No care due was identified.  [facility] Embedded Care Due Messages. Reference number: [id number].   5/23/2025 6:43:04 AM CDT

## 2025-05-23 NOTE — TELEPHONE ENCOUNTER
Refill Routing Note   Medication(s) are not appropriate for processing by [facility] for the following reason(s):        Patient not seen by provider within 15 months  Required labs outdated    [facility] action(s):  Defer               Appointments  past 12m or future 3m with PCP    Date Provider   Last Visit   2/14/2023 Donaldo Willis MD   Next Visit   Visit date not found Donaldo Willis MD   ED visits in past 90 days: 0        Note composed:10:43 AM 05/23/2025

## 2025-05-26 RX ORDER — METFORMIN HYDROCHLORIDE 500 MG/1
500 TABLET, EXTENDED RELEASE ORAL 2 TIMES DAILY WITH MEALS
Qty: 180 TABLET | Refills: 0 | Status: SHIPPED | OUTPATIENT
Start: 2025-05-26

## 2025-06-16 ENCOUNTER — PATIENT MESSAGE (OUTPATIENT)
Dept: FAMILY MEDICINE | Facility: CLINIC | Age: 65
End: 2025-06-16
Payer: MEDICARE

## 2025-06-20 ENCOUNTER — PATIENT MESSAGE (OUTPATIENT)
Dept: INTERNAL MEDICINE | Facility: CLINIC | Age: 65
End: 2025-06-20
Payer: MEDICARE

## 2025-06-25 DIAGNOSIS — I10 ESSENTIAL HYPERTENSION: Chronic | ICD-10-CM

## 2025-06-25 DIAGNOSIS — E11.69 TYPE 2 DIABETES MELLITUS WITH OTHER SPECIFIED COMPLICATION, WITHOUT LONG-TERM CURRENT USE OF INSULIN: ICD-10-CM

## 2025-06-25 RX ORDER — METFORMIN HYDROCHLORIDE 500 MG/1
500 TABLET, EXTENDED RELEASE ORAL 2 TIMES DAILY WITH MEALS
Qty: 180 TABLET | Refills: 0 | OUTPATIENT
Start: 2025-06-25

## 2025-06-25 RX ORDER — AMLODIPINE AND BENAZEPRIL HYDROCHLORIDE 5; 40 MG/1; MG/1
1 CAPSULE ORAL DAILY
Qty: 90 CAPSULE | Refills: 3 | OUTPATIENT
Start: 2025-06-25 | End: 2026-06-25

## 2025-06-25 NOTE — TELEPHONE ENCOUNTER
No care due was identified.  [facility] Embedded Care Due Messages. Reference number: [id number].   6/25/2025 4:10:52 PM CDT

## 2025-07-16 ENCOUNTER — OFFICE VISIT (OUTPATIENT)
Dept: INTERNAL MEDICINE | Facility: CLINIC | Age: 65
End: 2025-07-16
Payer: MEDICARE

## 2025-07-16 ENCOUNTER — LAB VISIT (OUTPATIENT)
Dept: LAB | Facility: HOSPITAL | Age: 65
End: 2025-07-16
Payer: MEDICARE

## 2025-07-16 VITALS
SYSTOLIC BLOOD PRESSURE: 132 MMHG | DIASTOLIC BLOOD PRESSURE: 74 MMHG | HEIGHT: 70 IN | HEART RATE: 82 BPM | BODY MASS INDEX: 40.15 KG/M2 | OXYGEN SATURATION: 93 % | WEIGHT: 280.44 LBS

## 2025-07-16 VITALS
HEIGHT: 70 IN | HEART RATE: 94 BPM | DIASTOLIC BLOOD PRESSURE: 72 MMHG | SYSTOLIC BLOOD PRESSURE: 140 MMHG | WEIGHT: 280.19 LBS | OXYGEN SATURATION: 97 % | BODY MASS INDEX: 40.11 KG/M2

## 2025-07-16 DIAGNOSIS — I10 ESSENTIAL HYPERTENSION: Chronic | ICD-10-CM

## 2025-07-16 DIAGNOSIS — G47.33 OSA (OBSTRUCTIVE SLEEP APNEA): ICD-10-CM

## 2025-07-16 DIAGNOSIS — R26.9 ABNORMALITY OF GAIT AND MOBILITY: ICD-10-CM

## 2025-07-16 DIAGNOSIS — E11.69 TYPE 2 DIABETES MELLITUS WITH OTHER SPECIFIED COMPLICATION, WITHOUT LONG-TERM CURRENT USE OF INSULIN: ICD-10-CM

## 2025-07-16 DIAGNOSIS — J42 CHRONIC BRONCHITIS, UNSPECIFIED CHRONIC BRONCHITIS TYPE: ICD-10-CM

## 2025-07-16 DIAGNOSIS — E66.01 SEVERE OBESITY (BMI >= 40): Chronic | ICD-10-CM

## 2025-07-16 DIAGNOSIS — E78.5 DYSLIPIDEMIA ASSOCIATED WITH TYPE 2 DIABETES MELLITUS: ICD-10-CM

## 2025-07-16 DIAGNOSIS — Z12.11 COLON CANCER SCREENING: ICD-10-CM

## 2025-07-16 DIAGNOSIS — E11.69 DYSLIPIDEMIA ASSOCIATED WITH TYPE 2 DIABETES MELLITUS: ICD-10-CM

## 2025-07-16 DIAGNOSIS — F32.4 MAJOR DEPRESSIVE DISORDER WITH SINGLE EPISODE, IN PARTIAL REMISSION: ICD-10-CM

## 2025-07-16 DIAGNOSIS — I27.20 PULMONARY HYPERTENSION: ICD-10-CM

## 2025-07-16 DIAGNOSIS — E11.69 TYPE 2 DIABETES MELLITUS WITH OTHER SPECIFIED COMPLICATION, WITHOUT LONG-TERM CURRENT USE OF INSULIN: Primary | ICD-10-CM

## 2025-07-16 DIAGNOSIS — Z00.00 ENCOUNTER FOR MEDICARE ANNUAL WELLNESS EXAM: Primary | ICD-10-CM

## 2025-07-16 LAB
ABSOLUTE EOSINOPHIL (OHS): 0.49 K/UL
ABSOLUTE MONOCYTE (OHS): 0.74 K/UL (ref 0.3–1)
ABSOLUTE NEUTROPHIL COUNT (OHS): 4.11 K/UL (ref 1.8–7.7)
ALBUMIN SERPL BCP-MCNC: 4.6 G/DL (ref 3.5–5.2)
ALP SERPL-CCNC: 76 UNIT/L (ref 40–150)
ALT SERPL W/O P-5'-P-CCNC: 43 UNIT/L (ref 10–44)
ANION GAP (OHS): 11 MMOL/L (ref 8–16)
AST SERPL-CCNC: 36 UNIT/L (ref 11–45)
BASOPHILS # BLD AUTO: 0.09 K/UL
BASOPHILS NFR BLD AUTO: 1.2 %
BILIRUB SERPL-MCNC: 0.6 MG/DL (ref 0.1–1)
BUN SERPL-MCNC: 21 MG/DL (ref 8–23)
CALCIUM SERPL-MCNC: 9.9 MG/DL (ref 8.7–10.5)
CHLORIDE SERPL-SCNC: 100 MMOL/L (ref 95–110)
CHOLEST SERPL-MCNC: 126 MG/DL (ref 120–199)
CHOLEST/HDLC SERPL: 3.9 {RATIO} (ref 2–5)
CO2 SERPL-SCNC: 26 MMOL/L (ref 23–29)
CREAT SERPL-MCNC: 0.8 MG/DL (ref 0.5–1.4)
EAG (OHS): 183 MG/DL (ref 68–131)
ERYTHROCYTE [DISTWIDTH] IN BLOOD BY AUTOMATED COUNT: 13.5 % (ref 11.5–14.5)
GFR SERPLBLD CREATININE-BSD FMLA CKD-EPI: >60 ML/MIN/1.73/M2
GLUCOSE SERPL-MCNC: 149 MG/DL (ref 70–110)
HBA1C MFR BLD: 8 % (ref 4–5.6)
HCT VFR BLD AUTO: 45.9 % (ref 40–54)
HDLC SERPL-MCNC: 32 MG/DL (ref 40–75)
HDLC SERPL: 25.4 % (ref 20–50)
HGB BLD-MCNC: 14.6 GM/DL (ref 14–18)
IMM GRANULOCYTES # BLD AUTO: 0.02 K/UL (ref 0–0.04)
IMM GRANULOCYTES NFR BLD AUTO: 0.3 % (ref 0–0.5)
LDLC SERPL CALC-MCNC: 57.4 MG/DL (ref 63–159)
LYMPHOCYTES # BLD AUTO: 2.21 K/UL (ref 1–4.8)
MCH RBC QN AUTO: 29.4 PG (ref 27–31)
MCHC RBC AUTO-ENTMCNC: 31.8 G/DL (ref 32–36)
MCV RBC AUTO: 93 FL (ref 82–98)
NONHDLC SERPL-MCNC: 94 MG/DL
NUCLEATED RBC (/100WBC) (OHS): 0 /100 WBC
PLATELET # BLD AUTO: 289 K/UL (ref 150–450)
PMV BLD AUTO: 9.8 FL (ref 9.2–12.9)
POTASSIUM SERPL-SCNC: 4.5 MMOL/L (ref 3.5–5.1)
PROT SERPL-MCNC: 8 GM/DL (ref 6–8.4)
RBC # BLD AUTO: 4.96 M/UL (ref 4.6–6.2)
RELATIVE EOSINOPHIL (OHS): 6.4 %
RELATIVE LYMPHOCYTE (OHS): 28.9 % (ref 18–48)
RELATIVE MONOCYTE (OHS): 9.7 % (ref 4–15)
RELATIVE NEUTROPHIL (OHS): 53.5 % (ref 38–73)
SODIUM SERPL-SCNC: 137 MMOL/L (ref 136–145)
TRIGL SERPL-MCNC: 183 MG/DL (ref 30–150)
TSH SERPL-ACNC: 1.84 UIU/ML (ref 0.4–4)
WBC # BLD AUTO: 7.66 K/UL (ref 3.9–12.7)

## 2025-07-16 PROCEDURE — 83036 HEMOGLOBIN GLYCOSYLATED A1C: CPT

## 2025-07-16 PROCEDURE — 4010F ACE/ARB THERAPY RXD/TAKEN: CPT | Mod: CPTII,S$GLB,, | Performed by: PHYSICIAN ASSISTANT

## 2025-07-16 PROCEDURE — 1160F RVW MEDS BY RX/DR IN RCRD: CPT | Mod: CPTII,S$GLB,, | Performed by: PHYSICIAN ASSISTANT

## 2025-07-16 PROCEDURE — 3077F SYST BP >= 140 MM HG: CPT | Mod: CPTII,S$GLB,, | Performed by: PHYSICIAN ASSISTANT

## 2025-07-16 PROCEDURE — 36415 COLL VENOUS BLD VENIPUNCTURE: CPT

## 2025-07-16 PROCEDURE — 1159F MED LIST DOCD IN RCRD: CPT | Mod: CPTII,S$GLB,, | Performed by: PHYSICIAN ASSISTANT

## 2025-07-16 PROCEDURE — 99214 OFFICE O/P EST MOD 30 MIN: CPT | Mod: S$GLB,,, | Performed by: PHYSICIAN ASSISTANT

## 2025-07-16 PROCEDURE — 80061 LIPID PANEL: CPT

## 2025-07-16 PROCEDURE — 3061F NEG MICROALBUMINURIA REV: CPT | Mod: CPTII,S$GLB,, | Performed by: PHYSICIAN ASSISTANT

## 2025-07-16 PROCEDURE — 99999 PR PBB SHADOW E&M-EST. PATIENT-LVL V: CPT | Mod: PBBFAC,,, | Performed by: NURSE PRACTITIONER

## 2025-07-16 PROCEDURE — 99999 PR PBB SHADOW E&M-EST. PATIENT-LVL V: CPT | Mod: PBBFAC,,, | Performed by: PHYSICIAN ASSISTANT

## 2025-07-16 PROCEDURE — 82040 ASSAY OF SERUM ALBUMIN: CPT

## 2025-07-16 PROCEDURE — 85025 COMPLETE CBC W/AUTO DIFF WBC: CPT

## 2025-07-16 PROCEDURE — 3008F BODY MASS INDEX DOCD: CPT | Mod: CPTII,S$GLB,, | Performed by: PHYSICIAN ASSISTANT

## 2025-07-16 PROCEDURE — 3066F NEPHROPATHY DOC TX: CPT | Mod: CPTII,S$GLB,, | Performed by: PHYSICIAN ASSISTANT

## 2025-07-16 PROCEDURE — 3052F HG A1C>EQUAL 8.0%<EQUAL 9.0%: CPT | Mod: CPTII,S$GLB,, | Performed by: PHYSICIAN ASSISTANT

## 2025-07-16 PROCEDURE — 3078F DIAST BP <80 MM HG: CPT | Mod: CPTII,S$GLB,, | Performed by: PHYSICIAN ASSISTANT

## 2025-07-16 PROCEDURE — 84443 ASSAY THYROID STIM HORMONE: CPT

## 2025-07-16 RX ORDER — INSULIN PUMP SYRINGE, 3 ML
EACH MISCELLANEOUS
Qty: 1 EACH | Refills: 0 | Status: SHIPPED | OUTPATIENT
Start: 2025-07-16 | End: 2026-07-16

## 2025-07-16 RX ORDER — LANCETS
EACH MISCELLANEOUS
Qty: 100 EACH | Refills: 3 | Status: SHIPPED | OUTPATIENT
Start: 2025-07-16

## 2025-07-16 RX ORDER — AMLODIPINE AND BENAZEPRIL HYDROCHLORIDE 5; 40 MG/1; MG/1
1 CAPSULE ORAL DAILY
Qty: 90 CAPSULE | Refills: 3 | Status: SHIPPED | OUTPATIENT
Start: 2025-07-16 | End: 2026-07-16

## 2025-07-16 RX ORDER — TIRZEPATIDE 5 MG/.5ML
5 INJECTION, SOLUTION SUBCUTANEOUS
Qty: 2 ML | Refills: 0 | Status: SHIPPED | OUTPATIENT
Start: 2025-08-13

## 2025-07-16 RX ORDER — TIRZEPATIDE 2.5 MG/.5ML
2.5 INJECTION, SOLUTION SUBCUTANEOUS
Qty: 2 ML | Refills: 0 | Status: SHIPPED | OUTPATIENT
Start: 2025-07-16

## 2025-07-16 NOTE — PROGRESS NOTES
"  Ned Brock presented for an initial Medicare AWV today. The following components were reviewed and updated:    Medical history  Family History  Social history  Allergies and Current Medications  Health Risk Assessment  Health Maintenance  Care Team    **See Completed Assessments for Annual Wellness visit with in the encounter summary    The following assessments were completed:  Depression Screening  Cognitive function Screening    Timed Get Up Test  Whisper Test      Opioid documentation:      Patient does not have a current opioid prescription.          Vitals:    07/16/25 0833 07/16/25 0912   BP: (!) 144/78 132/74   BP Location: Right arm    Pulse: 82    SpO2: (!) 93%    Weight: 127.2 kg (280 lb 6.8 oz)    Height: 5' 10" (1.778 m)      Body mass index is 40.24 kg/m².       Physical Exam  Constitutional:       Appearance: He is obese.   Musculoskeletal:      Comments: Gait antalgic, ambulates with cane   Skin:     General: Skin is warm and dry.   Neurological:      General: No focal deficit present.      Mental Status: He is alert.   Psychiatric:         Mood and Affect: Mood normal.           Diagnoses and health risks identified today and associated recommendations/orders:    1. Encounter for Medicare annual wellness exam  Here for Health Risk Assessment/Annual Wellness Visit.  Health maintenance reviewed and updated. Follow up in one year.   Prescription given for Covid vaccine.  Fit KIt given per [facility] order.  - Referral to Enhanced Annual Wellness Visit (eAWV) W+1    2. Essential hypertension  Chronic, initial B/P not at goal. Repeat at goal. Reports he is out of some medications, but is unsure if he it out of B/P meds. Followed by PCP.    3. Pulmonary hypertension  Chronic, stable. Most recent PA pressure 28 noted on TTE 4/04/22. Followed by PCP.    4. Dyslipidemia associated with type 2 diabetes mellitus  Chronic, last lab 2/2024 not at goal. He is unsure if he is taking statin. Followed by PCP.     5. " Type 2 diabetes mellitus with other specified complication, without long-term current use of insulin  Chronic, last A1c  2/2024 not at goal. A1c 7.8. Followed by PCP.  - Ambulatory referral/consult to Podiatry; Future    6. Major depressive disorder with single episode, in partial remission  Chronic, stable with citalopram. PHQ-2 score 0. . Followed by PCP.     7. Severe obesity (BMI >= 40)  Chronic, stable. Reports he is not exercising. Followed by PCP.    8. YANETH (obstructive sleep apnea)  Chronic, stable with CPAP. Followed by PCP    9. Abnormality of gait and mobility  Chronic, ambulates with cane. Fall with injury approximately 11 months ago. Followed by PCP.      Provided Ned with a 5-10 year written screening schedule and personal prevention plan. Recommendations were developed using the USPSTF age appropriate recommendations. Education, counseling, and referrals were provided as needed.  After Visit Summary printed and given to patient which includes a list of additional screenings\tests needed.    Follow up today (on 7/16/2025).with SUSAN Celis NP

## 2025-07-16 NOTE — Clinical Note
Pt reports following with longtime outside Ophthalmology Dr. Brenton Rankin [facility] for his diabetic eye exams. Can we request records.

## 2025-07-16 NOTE — PROGRESS NOTES
Subjective:       Patient ID: Ned Brock is a 64 y.o. male with PMH of DM, HTN, YANETH, BPH    Chief Complaint: Medication Refill      Established pt of Donaldo Willis MD (new to me)    HPI        Here for med refills    Last seen by PCP care team April 2024    HTN: BP slightly above goal today, no meds this am. Running out of Lotrel.     DM: Last A1c 7.8 (Feb 2024), Not checking BG at home. Eats sweets often, struggling with weight. Inquires about Mounjaro. Prev tried Ozempic. Currently taking Metformin 500mg BID. Eye exam UTD per pt with outside Ophthalmology Dr. 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    RIGHT, S/P SURGERGY ON 6/7/22    YANETH on CPAP     Pneumonia due to COVID-19 virus 01/12/2022       Social History[1]    Review of patient's allergies indicates:  No Known Allergies    Current Medications[2]    Family History   Problem Relation Name Age of Onset    Breast cancer Mother         Past Surgical History:   Procedure Laterality Date    CHOLECYSTECTOMY  06/07/2017    CYST REMOVAL      EPIDURAL STEROID INJECTION Right 3/31/2023    Procedure: Right Hip Intra-articular Steroid Injection;  Surgeon: Juanito Ann Jr., MD;  Location: [facility];  Service: Pain Management;  Laterality: Right;  @1400  No ATC  Check BG    EYE SURGERY Bilateral     LASIX    JOINT REPLACEMENT Right 06/07/2022       Review of Systems   Constitutional:  Negative for chills, fever and unexpected weight change.   Respiratory:  Negative for cough, shortness of breath and wheezing.    Cardiovascular:  Negative for chest pain and leg swelling.   Gastrointestinal:  Negative for abdominal pain, nausea and vomiting.   Musculoskeletal:  Positive for arthralgias.   Integumentary:  Negative for  "rash.   Neurological:  Negative for weakness and headaches.       Objective: BP (!) 140/72   Pulse 94   Ht 5' 10" (1.778 m)   Wt 127.1 kg (280 lb 3.3 oz)   SpO2 97%   BMI 40.21 kg/m²         Physical Exam  Vitals reviewed.   Constitutional:       General: He is not in acute distress.     Appearance: He is well-developed. He is not ill-appearing.   HENT:      Head: Normocephalic and atraumatic.   Cardiovascular:      Rate and Rhythm: Normal rate and regular rhythm.      Heart sounds: No murmur heard.  Pulmonary:      Effort: Pulmonary effort is normal.      Breath sounds: Normal breath sounds. No wheezing or rales.   Musculoskeletal:      Right lower leg: No edema.      Left lower leg: No edema.      Comments: Ambulating with cane   Skin:     General: Skin is warm and dry.      Findings: No rash.   Neurological:      Mental Status: He is alert and oriented to person, place, and time.   Psychiatric:         Mood and Affect: Mood normal.         Assessment:       1. Type 2 diabetes mellitus with other specified complication, without long-term current use of insulin    2. Essential hypertension    3. Severe obesity (BMI >= 40)    4. Chronic bronchitis, unspecified chronic bronchitis type    5. Colon cancer screening        Plan:             Ned was seen today for medication refill.    Diagnoses and all orders for this visit:    Type 2 diabetes mellitus with other specified complication, without long-term current use of insulin  Uncontrolled  Lab Results   Component Value Date    HGBA1C 8.0 (H) 07/16/2025   Adding Mounjaro as below  Continue metformin 500mg BID  Advised on diet and monitor BG daily  DM testing supplies prescribed  -     CBC Auto Differential; Future  -     Comprehensive Metabolic Panel; Future  -     TSH; Future  -     Lipid Panel; Future  -     Hemoglobin A1C; Future  -     Microalbumin/Creatinine Ratio, Urine; Future  -     blood-glucose meter kit; To check BG 1 times daily, to use with " insurance preferred meter  -     lancets Misc; To check BG 1 times daily, to use with insurance preferred meter  -     blood sugar diagnostic Strp; To check BG 1 times daily, to use with insurance preferred meter  -     tirzepatide (MOUNJARO) 2.5 mg/0.5 mL PnIj; Inject 2.5 mg into the skin every 7 days.  -     tirzepatide (MOUNJARO) 5 mg/0.5 mL PnIj; Inject 5 mg into the skin every 7 days.    Essential hypertension  BP goals and lifestyle mods discussed  Meds refilled and reviewed  -     amLODIPine-benazepriL (LOTREL) 5-40 mg per capsule; Take 1 capsule by mouth once daily.    Severe obesity (BMI >= 40)  BMI reviewed  Lifestyle mods, and DM diet discussed  Med profile discussed  No personal/FH of medullary thyroid ca, pancreatitis or gallbladder issues  Discussed limitations with insurance coverage  Pt to notify me if she rcvs/starts medications.   -     tirzepatide (MOUNJARO) 2.5 mg/0.5 mL PnIj; Inject 2.5 mg into the skin every 7 days.  -     tirzepatide (MOUNJARO) 5 mg/0.5 mL PnIj; Inject 5 mg into the skin every 7 days.    Chronic bronchitis, unspecified chronic bronchitis type  Stable with prn use of albuterol    Colon cancer screening  -     Fecal Immunochemical Test (iFOBT); Future  [employer]Kit was given to patient on 7/16/2025 9:00 AM                     Patient Instructions   Lab today      Amlodipine-benazepril for your BLOOD PRESSURE    Atorvastatin FOR CHOLESTEROL    Citalopram FOR MOOD/DEPRESSION    Metformin FOR DIABETES      Metamucil daily to prevent constipation    Follow up with Dr. Willis in 3 months        Future Appointments   Date Time Provider Department Center   7/31/2025  2:00 PM Macie Knapp MD [facility] MED [place]   8/4/2025  8:00 AM Parish Arenas DPM [facility] POD [place]   10/14/2025  8:30 AM Donaldo Willis MD [facility] [place]     Katina Kelly PA-C       [1]   Social History  Tobacco Use    Smoking status: Never    Smokeless tobacco: Never   Substance Use Topics    Alcohol  use: Yes     Comment: rarely -on holidays    Drug use: No   [2]   Current Outpatient Medications:     albuterol (ACCUNEB) 0.63 mg/3 mL Nebu, Take 11.9 mLs (2.499 mg total) by nebulization every 6 (six) hours as needed (wheezing/shortness of breath). Rescue, Disp: 75 mL, Rfl: 3    ascorbic acid (VITAMIN C ORAL), Take 1 capsule by mouth once daily., Disp: , Rfl:     atorvastatin (LIPITOR) 40 MG tablet, Take 1 tablet by mouth once daily, Disp: 90 tablet, Rfl: 0    citalopram (CELEXA) 40 MG tablet, Take 1 tablet by mouth once daily, Disp: 90 tablet, Rfl: 0    diclofenac sodium (VOLTAREN) 1 % Gel, Apply 2 g topically 4 (four) times daily as needed (Pain)., Disp: 50 g, Rfl: 0    ergocalciferol, vitamin D2, (VITAMIN D ORAL), Take 1 capsule by mouth once daily., Disp: , Rfl:     fluticasone propionate (FLONASE) 50 mcg/actuation nasal spray, 2 sprays (100 mcg total) by Each Nostril route once daily. (Patient taking differently: 2 sprays by Each Nostril route daily as needed for Allergies.), Disp: 15.8 mL, Rfl: 0    metFORMIN (GLUCOPHAGE-XR) 500 MG ER 24hr tablet, TAKE 1 TABLET BY MOUTH TWICE DAILY WITH MEALS, Disp: 180 tablet, Rfl: 0    promethazine-dextromethorphan (PROMETHAZINE-DM) 6.25-15 mg/5 mL Syrp, Take 5 mLs by mouth every 6 (six) hours as needed (cough)., Disp: 118 mL, Rfl: 0    ZINC ORAL, Take 1 tablet by mouth once daily., Disp: , Rfl:     amLODIPine-benazepriL (LOTREL) 5-40 mg per capsule, Take 1 capsule by mouth once daily., Disp: 90 capsule, Rfl: 3    blood sugar diagnostic Strp, To check BG 1 times daily, to use with insurance preferred meter, Disp: 100 each, Rfl: 3    blood-glucose meter kit, To check BG 1 times daily, to use with insurance preferred meter, Disp: 1 each, Rfl: 0    cetirizine (ZYRTEC) 10 MG tablet, Take 1 tablet (10 mg total) by mouth once daily., Disp: 30 tablet, Rfl: 0    lancets Misc, To check BG 1 times daily, to use with insurance preferred meter, Disp: 100 each, Rfl: 3

## 2025-07-16 NOTE — PATIENT INSTRUCTIONS
Counseling and Referral of Other Preventative  (Italic type indicates deductible and co-insurance are waived)    Patient Name: Ned Brock  Today's Date: 7/16/2025    Health Maintenance       Date Due Completion Date    HIV Screening Never done ---    RSV Vaccine (Age 60+ and Pregnant patients) (1 - Risk 60-74 years 1-dose series) Never done ---    Diabetic Eye Exam 02/01/2023 2/1/2022 (Done)    Override on 2/1/2022: Done    Diabetes Urine Screening 02/15/2023 2/15/2022    Foot Exam 02/15/2023 2/15/2022 (Done)    Override on 2/15/2022: Done    Colorectal Cancer Screening 04/07/2023 4/7/2022    Hemoglobin A1c 08/29/2024 2/29/2024    COVID-19 Vaccine (6 - 2024-25 season) 09/01/2024 7/12/2022    Lipid Panel 02/28/2025 2/29/2024    Influenza Vaccine (1) 09/01/2025 10/21/2022    TETANUS VACCINE 01/20/2030 1/20/2020        Orders Placed This Encounter   Procedures    Ambulatory referral/consult to Podiatry       The following information is provided to all patients.  This information is to help you find resources for any of the problems found today that may be affecting your health:                  Living healthy guide: www.[facility].[place].gov      Understanding Diabetes: www.diabetes.org      Eating healthy: www.cdc.gov/healthyweight      CDC home safety checklist: www.cdc.gov/steadi/patient.html      Agency on Aging: www.goea.[place].[facility]      Alcoholics anonymous (AA): www.aa.org      Physical Activity: www.bonifacio.nih.gov/ms1lxwi      Tobacco use: www.quitwithusla.org

## 2025-07-16 NOTE — PATIENT INSTRUCTIONS
Lab today      Amlodipine-benazepril for your BLOOD PRESSURE    Atorvastatin FOR CHOLESTEROL    Citalopram FOR MOOD/DEPRESSION    Metformin FOR DIABETES      Metamucil daily to prevent constipation    Follow up with Dr. Willis in 3 months

## 2025-07-17 ENCOUNTER — PATIENT OUTREACH (OUTPATIENT)
Dept: ADMINISTRATIVE | Facility: HOSPITAL | Age: 65
End: 2025-07-17
Payer: MEDICARE

## 2025-07-17 NOTE — PROGRESS NOTES
Chart reviewed and updated. Reconciled immunizations, health maintenance and care everywhere.  Requesting Eye exam records from Dr. Brenton Sanchez OD at [facility].    Name: BRENTON SANCHEZ OD  Title: owner  Phone: [phone]    Mailing Address [address] [address]  [place] [address]  [facility]    Phone: [phone]  Fax:[phone]       Yolanda June [facility]  Panel Care Coordinator  [facility]  [phone]  For Donaldo Willis MD

## 2025-07-17 NOTE — LETTER
AUTHORIZATION FOR RELEASE OF   CONFIDENTIAL INFORMATION    Dear [facility] Staff/Medical Records,    We are seeing Ned Brock, date of birth 1960, in the clinic at [facility] INTERNAL MEDICINE. Donaldo Willis MD is the patient's PCP. Ned Brock has an outstanding lab/procedure at the time we reviewed his chart. In order to help keep his health information updated, he has authorized us to request the following medical record(s):        (  )  MAMMOGRAM                                      (  )  COLONOSCOPY      (  )  PAP SMEAR                                          (  )  OUTSIDE LAB RESULTS     (  )  DEXA SCAN                                          ( xx )  Diabetic/EYE EXAM            (  )  FOOT EXAM                                          (  )  ENTIRE RECORD     (  )  OUTSIDE IMMUNIZATIONS                 (  )  _______________         Please fax records to Donaldo Willis MD,  at [phone] or email to [email or web address].       If you have any questions, please contact SHONA Guerrero at [phone].           Patient Name: Ned Brock  : 1960  Patient Phone #: [phone]                              Ned Brock  MRN: [id number]  : 1960  Age: 64 y.o.  Sex: male         Patient/Legal Guardian Signature  This signature was collected at 2025    Ned Brock     Self  _______________________________   Printed Name/Relationship to Patient      Consent for Examination and Treatment: I hereby authorize the providers and employees of [facility] ([facility]) to provide medical treatment/services which includes, but is not limited to, performing and administering tests and diagnostic procedures that are deemed necessary, including, but not limited to, imaging examinations, blood tests and other laboratory procedures as may be required by the hospital, clinic, or may be ordered by my physician(s) or persons working under the general and/or special instructions  of my physician(s).      I understand and agree that this consent covers all authorized persons, including but not limited to physicians, residents, nurse practitioners, physicians' assistants, specialists, consultants, student nurses, and independently contracted physicians, who are called upon by the physician in charge, to carry out the diagnostic procedures and medical or surgical treatment.     I hereby authorize [facility] to retain or dispose of any specimens or tissue, should there be such remaining from any test or procedure.     I hereby authorize and give consent for [facility] providers and employees to take photographs, images or videotapes of such diagnostic, surgical or treatment procedures of Patient as may be required by [facility] or as may be ordered by a physician. I further acknowledge and agree that [facility] may use cameras or other devices for patient monitoring.     I am aware that the practice of medicine is not an exact science, and I acknowledge that no guarantees have been made to me as to the outcome of any tests, procedures or treatment.     Authorization for Release of Information: I understand that my insurance company and/or their agents may need information necessary to make determinations about payment/reimbursement. I hereby provide authorization to release to all insurance companies, their successors, assignees, other parties with whom they may have contracted, or others acting on their behalf, that are involved with payment for any hospital and/or clinic charges incurred by the patient, any information that they request and deem necessary for payment/reimbursement, and/or quality review.  I further authorize the release of my health information to physicians or other health care practitioners on staff who are involved in my health care now and in the future, and to other health care providers, entities, or institutions for the purpose of my continued care and treatment, including  referrals.     REGISTRATION AUTHORIZATION  Form No. 71503 (Rev. 3/25/2024)    Page 1 of 3                       Medicare Patient's Certification and Authorization to Release Information and Payment Request:  I certify that the information given by me in applying for payment under Title XVIII of the Social Security Act is correct. I authorize any nino of medical or other information about me to release to the [facility], or its intermediaries or carriers, any information needed for this or a related Medicare claim. I request that payment of authorized benefits be made on my behalf.     Assignment of Insurance Benefits:   I hereby authorize any and all insurance companies, health plans, defined   benefit plans, health insurers or any entity that is or may be responsible for payment of my medical expenses to pay all hospital and medical benefits now due, and to become due and payable to me under any hospital benefits, sick benefits, injury benefits or any other benefit for services rendered to me, including Major Medical Benefits, direct to [facility] and all independently contracted physicians. I assign any and all rights that I may have against any and all insurance companies, health plans, defined benefit plans, health insurers or any entity that is or may be responsible for payment of my medical expenses, including, but not limited to any right to appeal a denial of a claim, any right to bring any action, lawsuit, administrative proceeding, or other cause of action on my behalf. I specifically assign my right to pursue litigation against any and all insurance companies, health plans, defined benefit plans, health insurers or any entity that is or may be responsible for payment of my medical expenses based upon a refusal to pay charges.            E. Valuables: It is understood and agreed that [facility] is not liable for the damage to or loss of any money, jewelry,   documents, dentures, eye glasses,  hearing aids, prosthetics, or other property of value.     F. Computer Equipment: I understand and agree that should I choose to use computer equipment owned by [facility] or if I choose to access the Internet via [facility]s network, I do so at my own risk. [facility] is not responsible for any damage to my computer equipment or to any damages of any type that might arise from my loss of equipment or data.     G. Acceptance of Financial Responsibility:  I agree that in consideration of the services and   supplies that have been   or will be furnished to the patient, I am hereby obligated to pay all charges made for or on the account of the patient according to the standard rates (in effect at the time the services and supplies are delivered) established by [facility], including its Patient Financial Assistance Policy to the extent it is applicable. I understand that I am responsible for all charges, or portions thereof, not covered by insurance or other sources. Patient refunds will be distributed only after balances at all [facility] facilities are paid.     H. Communication Authorization:  I hereby authorize [facility] and its representatives, along with any billing service   or  who may work on their behalf, to contact me on   my cell phone and/or home phone using pre- recorded messages, artificial voice messages, automatic telephone dialing devices or other computer assisted technology, or by electronic      mail, text messaging, or by any other form of electronic communication. This includes, but is not limited to, appointment reminders, yearly physical exam reminders, preventive care reminders, patient campaigns, welcome calls, and calls about account balances on my account or any account on which I am listed as a guarantor. I understand I have the right to opt out of these communications at any time.      Relationship  Between  Facility and  Provider:      I understand that some, but not all, providers  furnishing services to the patient are not employees or agents of [facility]. The patient is under the care and supervision of his/her attending physician, and it is the responsibility of the facility and its nursing staff to carry out the instructions of such physicians. It is the responsibility of the patient's physician/designee to obtain the patient's informed consent, when required, for medical or surgical treatment, special diagnostic or therapeutic procedures, or hospital services rendered for the patient under the special instructions of the physician/designee.           REGISTRATION AUTHORIZATION  Form No. 30048 (Rev. 3/25/2024)    Page 2 of 3                       Immunizations: [facility] shares immunization information with state sponsored health departments to help you and your doctor keep track of your immunization records. By signing, you consent to have this information shared with the health department in your state:                                [place] - LINKS ([place] Immunization Network for Kids Statewide)                                [place] - MIIX ([place] Immunization Information eXchange)                                [place] - ImmPRINT (Immunization Patient Registry with Integrated Technology)     TERM: This authorization is valid for this and subsequent care/treatment I receive at [facility] and will remain valid unless/until revoked in writing by me.     [facility]: As used in this document, [facility] means all [facility] owned and managed facilities, including, but not limited to, all health centers, surgery centers, clinics, urgent care centers, and hospitals.         [facility] complies with applicable Federal civil rights laws and does not discriminate on the basis of race, color, national origin, age, disability, or sex.  ATENCIÓN: si habla darlene, tiene a danielle disposición servicios gratuitos de asistencia lingüística. Jacky dumont [phone].  CHÚ Ý:  N?u b?n nói Ti?ng Vi?t, có các d?ch v? h? tr? ngôn ng? mi?n phí dành cho b?n. G?i s? [phone].        REGISTRATION AUTHORIZATION  Form No. 96233 (Rev. 3/25/2024)   Page 3 of 3

## 2025-08-04 ENCOUNTER — OFFICE VISIT (OUTPATIENT)
Dept: PODIATRY | Facility: CLINIC | Age: 65
End: 2025-08-04
Payer: MEDICARE

## 2025-08-04 VITALS — BODY MASS INDEX: 40.11 KG/M2 | HEIGHT: 70 IN | WEIGHT: 280.19 LBS

## 2025-08-04 DIAGNOSIS — M79.671 FOOT PAIN, BILATERAL: ICD-10-CM

## 2025-08-04 DIAGNOSIS — M79.672 FOOT PAIN, BILATERAL: ICD-10-CM

## 2025-08-04 DIAGNOSIS — G60.9 IDIOPATHIC PERIPHERAL NEUROPATHY: Primary | ICD-10-CM

## 2025-08-04 DIAGNOSIS — E11.42 TYPE 2 DIABETES MELLITUS WITH DIABETIC POLYNEUROPATHY, UNSPECIFIED WHETHER LONG TERM INSULIN USE: ICD-10-CM

## 2025-08-04 DIAGNOSIS — E11.69 TYPE 2 DIABETES MELLITUS WITH OTHER SPECIFIED COMPLICATION, WITHOUT LONG-TERM CURRENT USE OF INSULIN: ICD-10-CM

## 2025-08-04 PROCEDURE — 3066F NEPHROPATHY DOC TX: CPT | Mod: CPTII,S$GLB,, | Performed by: PODIATRIST

## 2025-08-04 PROCEDURE — 3052F HG A1C>EQUAL 8.0%<EQUAL 9.0%: CPT | Mod: CPTII,S$GLB,, | Performed by: PODIATRIST

## 2025-08-04 PROCEDURE — 3061F NEG MICROALBUMINURIA REV: CPT | Mod: CPTII,S$GLB,, | Performed by: PODIATRIST

## 2025-08-04 PROCEDURE — 1159F MED LIST DOCD IN RCRD: CPT | Mod: CPTII,S$GLB,, | Performed by: PODIATRIST

## 2025-08-04 PROCEDURE — 4010F ACE/ARB THERAPY RXD/TAKEN: CPT | Mod: CPTII,S$GLB,, | Performed by: PODIATRIST

## 2025-08-04 PROCEDURE — 99999 PR PBB SHADOW E&M-EST. PATIENT-LVL III: CPT | Mod: PBBFAC,,, | Performed by: PODIATRIST

## 2025-08-04 PROCEDURE — 3008F BODY MASS INDEX DOCD: CPT | Mod: CPTII,S$GLB,, | Performed by: PODIATRIST

## 2025-08-04 RX ORDER — LIDOCAINE AND PRILOCAINE 25; 25 MG/G; MG/G
CREAM TOPICAL
Qty: 30 G | Refills: 3 | Status: SHIPPED | OUTPATIENT
Start: 2025-08-04

## 2025-08-04 NOTE — PROGRESS NOTES
Subjective:      Patient ID: Ned Brock is a 64 y.o. male.    Chief Complaint: Numbness and Diabetic Foot Exam (7/16/25 [facility] Link)    Diabetes, increased risk amputation needing evaluation/management/optomization of foot care.    Cc2  burning and tingling in the fingers and toe tips.  Gradual onset.  Chronic condition present for over 2 years.  This exacerbations gradual onset, worsening over the past few months.  No particular aggravating factor but seems worse at night.  No prior medical treatment.  No self-treatment.  Denies trauma and surgery both feet.    Chief Complaint   Patient presents with    Numbness    Diabetic Foot Exam     7/16/25 [facility] Link       Casual shoes bothf eet    Review of Systems   Constitutional: Negative for chills, diaphoresis, fever, malaise/fatigue and night sweats.   Cardiovascular:  Negative for claudication, cyanosis, leg swelling and syncope.   Skin:  Negative for color change, dry skin, nail changes, rash, suspicious lesions and unusual hair distribution.   Musculoskeletal:  Negative for falls, joint pain, joint swelling, muscle cramps, muscle weakness and stiffness.   Gastrointestinal:  Negative for constipation, diarrhea, nausea and vomiting.   Neurological:  Positive for paresthesias and sensory change. Negative for brief paralysis, disturbances in coordination, focal weakness, numbness and tremors.           Objective:      Physical Exam  Constitutional:       General: He is not in acute distress.     Appearance: He is well-developed. He is not diaphoretic.   Cardiovascular:      Pulses:           Popliteal pulses are 2+ on the right side and 2+ on the left side.        Dorsalis pedis pulses are 2+ on the right side and 2+ on the left side.        Posterior tibial pulses are 2+ on the right side and 2+ on the left side.      Comments: Capillary refill 3 seconds all toes/distal feet, all toes/both feet warm to touch.      Negative lymphadenopathy bilateral popliteal fossa  and tarsal tunnel.      Negavie lower extremity edema bilateral.    Musculoskeletal:      Right ankle: No swelling, deformity, ecchymosis or lacerations. Normal range of motion. Normal pulse.      Right Achilles Tendon: Normal. No defects. Ramírez's test negative.      Comments: Pain to palpation and end range of motion left and right 1st mtpj with visible and palpable periarticular exostoses, and reduced range of motion with mild crepitus.      Otherwise, Normal angle, base, station of gait. All ten toes without clubbing, cyanosis, or signs of ischemia.  No pain to palpation bilateral lower extremities.  Range of motion, stability, muscle strength, and muscle tone normal bilateral feet and legs.    Lymphadenopathy:      Lower Body: No right inguinal adenopathy. No left inguinal adenopathy.      Comments: Negative lymphadenopathy bilateral popliteal fossa and tarsal tunnel.    Negative lymphangitic streaking bilateral feet/ankles/legs.   Skin:     General: Skin is warm and dry.      Capillary Refill: Capillary refill takes 2 to 3 seconds.      Coloration: Skin is not pale.      Findings: No abrasion, bruising, burn, ecchymosis, erythema, laceration, lesion or rash.      Nails: There is no clubbing.      Comments: Skin is normal age and health appropriate color, turgor, texture, and temperature bilateral lower extremities without ulceration, hyperpigmentation, discoloration, masses nodules or cords palpated.  No ecchymosis, erythema, edema, or cardinal signs of infection bilateral lower extremities.    Toenails normal color and trophic qualities.        Neurological:      Mental Status: He is alert and oriented to person, place, and time.      Sensory: No sensory deficit.      Motor: No tremor, atrophy or abnormal muscle tone.      Gait: Gait normal.      Deep Tendon Reflexes:      Reflex Scores:       Patellar reflexes are 2+ on the right side and 2+ on the left side.       Achilles reflexes are 2+ on the right  side and 2+ on the left side.     Comments: Negative tinel sign to percussion sural, superficial peroneal, deep peroneal, saphenous, and posterior tibial nerves right and left ankles and feet.    Paresthesias, and burning bilateral feet with no clearly identified trigger or source.     Psychiatric:         Behavior: Behavior is cooperative.           Assessment:       Encounter Diagnoses   Name Primary?    Idiopathic peripheral neuropathy Yes    Foot pain, bilateral     Type 2 diabetes mellitus with diabetic polyneuropathy, unspecified whether long term insulin use          Plan:       Ned was seen today for numbness and diabetic foot exam.    Diagnoses and all orders for this visit:    Idiopathic peripheral neuropathy    Foot pain, bilateral    Type 2 diabetes mellitus with diabetic polyneuropathy, unspecified whether long term insulin use    Other orders  -     LIDOcaine-prilocaine (EMLA) cream; Apply topically as needed.      I counseled the patient on his conditions, their implications and medical management.        The patient has received literature on basic diabetic foot care.  Patient will inspect feet daily, wear protective shoe gear when ambulatory, and apply moisturizer to skin as needed to maintain elasticity and help prevent ulceration.    Inspect feet multiple times daily for signs of occurrence/recurrence ulceration.    Discussed conservative treatment with shoes of adequate dimensions, material, and style to alleviate symptoms and delay or prevent surgical intervention.    Emla              No follow-ups on file.

## 2025-08-05 ENCOUNTER — LAB VISIT (OUTPATIENT)
Dept: LAB | Facility: HOSPITAL | Age: 65
End: 2025-08-05
Payer: MEDICARE

## 2025-08-05 DIAGNOSIS — Z12.11 COLON CANCER SCREENING: ICD-10-CM

## 2025-08-05 LAB — OB PNL STL IA: NEGATIVE

## 2025-08-05 PROCEDURE — 82274 ASSAY TEST FOR BLOOD FECAL: CPT

## 2025-08-12 ENCOUNTER — TELEPHONE (OUTPATIENT)
Dept: PHARMACY | Facility: CLINIC | Age: 65
End: 2025-08-12
Payer: MEDICARE

## (undated) DEVICE — SEE MEDLINE ITEM 146372

## (undated) DEVICE — CONTAINER SPECIMEN STRL 4OZ

## (undated) DEVICE — SKINMARKER & RULER REGULAR X-F

## (undated) DEVICE — SYR 10CC LUER LOCK

## (undated) DEVICE — NDL 18GA X1 1/2 REG BEVEL

## (undated) DEVICE — BAG TISS RETRV MONARCH 10MM

## (undated) DEVICE — BLADE SURG CARBON STEEL SZ11

## (undated) DEVICE — SEE L#120831

## (undated) DEVICE — TIP SMART 309 3X8.5MM

## (undated) DEVICE — TROCAR ENDOPATH XCEL 11MM 10CM

## (undated) DEVICE — SOL NS 1000CC

## (undated) DEVICE — APPLICATOR CHLORAPREP ORN 26ML

## (undated) DEVICE — SYR 30CC LUER LOCK

## (undated) DEVICE — NDL HYPO REG 25G X 1 1/2

## (undated) DEVICE — GLOVE SURGICAL LATEX SZ 8

## (undated) DEVICE — SUT 0 VICRYL / UR6 (J603)

## (undated) DEVICE — POSITIONER HEAD DONUT 9IN FOAM

## (undated) DEVICE — GLOVE SURG BIOGEL LATEX SZ 7.5

## (undated) DEVICE — ADHESIVE DERMABOND ADVANCED

## (undated) DEVICE — APPLIER CLIP ENDO LIGAMAX 5MM

## (undated) DEVICE — SUT MONOCRYL 4-0 PS-2

## (undated) DEVICE — PACK ENDOSCOPY GENERAL

## (undated) DEVICE — Device

## (undated) DEVICE — NDL INSUF ULTRA VERESS 120MM

## (undated) DEVICE — SEAL SCOPE WARMER 20/BX

## (undated) DEVICE — SCISSOR CURVED ENDOPATH 5MM

## (undated) DEVICE — SHEET DRAPE FAN-FOLDED 3/4

## (undated) DEVICE — GAUZE SPONGE 4X4 12PLY

## (undated) DEVICE — BLANKET UPPER BODY 78.7X29.9IN

## (undated) DEVICE — TROCAR ENDOPATH XCEL 5X100MM

## (undated) DEVICE — KIT ANTIFOG

## (undated) DEVICE — ELECTRODE REM PLYHSV RETURN 9

## (undated) DEVICE — CHLORAPREP W TINT 26ML APPL

## (undated) DEVICE — SEE MEDLINE ITEM 152622

## (undated) DEVICE — SEE MEDLINE ITEM 157117

## (undated) DEVICE — TUBING INSUFFLATION 10

## (undated) DEVICE — IRRIGATOR ENDOSCOPY DISP.

## (undated) DEVICE — CANISTER SUCTION 2 LTR